# Patient Record
Sex: MALE | Race: WHITE | Employment: OTHER | URBAN - METROPOLITAN AREA
[De-identification: names, ages, dates, MRNs, and addresses within clinical notes are randomized per-mention and may not be internally consistent; named-entity substitution may affect disease eponyms.]

---

## 2017-01-10 ENCOUNTER — HOSPITAL ENCOUNTER (OUTPATIENT)
Dept: MRI IMAGING | Facility: HOSPITAL | Age: 67
Discharge: HOME | End: 2017-01-10
Attending: FAMILY MEDICINE
Payer: MEDICARE

## 2017-01-10 DIAGNOSIS — E78.5 OTHER AND UNSPECIFIED HYPERLIPIDEMIA: ICD-10-CM

## 2017-01-10 DIAGNOSIS — I25.810 CORONARY ARTERY DISEASE INVOLVING CORONARY BYPASS GRAFT OF NATIVE HEART WITHOUT ANGINA PECTORIS: ICD-10-CM

## 2017-01-10 DIAGNOSIS — E11.41 DIABETIC MONONEUROPATHY ASSOCIATED WITH TYPE 2 DIABETES MELLITUS (HCC): ICD-10-CM

## 2017-01-10 DIAGNOSIS — H53.9 VISUAL CHANGES: ICD-10-CM

## 2017-01-10 DIAGNOSIS — E11.9 TYPE II OR UNSPECIFIED TYPE DIABETES MELLITUS WITHOUT MENTION OF COMPLICATION, NOT STATED AS UNCONTROLLED: ICD-10-CM

## 2017-01-10 DIAGNOSIS — I10 UNSPECIFIED ESSENTIAL HYPERTENSION: ICD-10-CM

## 2017-01-10 DIAGNOSIS — R42 VERTIGO: ICD-10-CM

## 2017-01-10 DIAGNOSIS — Z86.79 HISTORY OF THORACIC AORTIC ANEURYSM REPAIR: ICD-10-CM

## 2017-01-10 DIAGNOSIS — E04.1 THYROID NODULE: ICD-10-CM

## 2017-01-10 DIAGNOSIS — Z98.890 HISTORY OF THORACIC AORTIC ANEURYSM REPAIR: ICD-10-CM

## 2017-01-10 DIAGNOSIS — Z95.2 S/P AVR: ICD-10-CM

## 2017-01-10 PROCEDURE — A9575 INJ GADOTERATE MEGLUMI 0.1ML: HCPCS

## 2017-01-10 PROCEDURE — 70549 MR ANGIOGRAPH NECK W/O&W/DYE: CPT

## 2017-01-10 PROCEDURE — 70546 MR ANGIOGRAPH HEAD W/O&W/DYE: CPT

## 2017-03-29 ENCOUNTER — APPOINTMENT (OUTPATIENT)
Dept: CV DIAGNOSTICS | Facility: HOSPITAL | Age: 67
DRG: 175 | End: 2017-03-29
Attending: EMERGENCY MEDICINE
Payer: MEDICARE

## 2017-03-29 ENCOUNTER — APPOINTMENT (OUTPATIENT)
Dept: CT IMAGING | Facility: HOSPITAL | Age: 67
DRG: 175 | End: 2017-03-29
Attending: EMERGENCY MEDICINE
Payer: MEDICARE

## 2017-03-29 ENCOUNTER — OFFICE VISIT (OUTPATIENT)
Dept: FAMILY MEDICINE CLINIC | Facility: CLINIC | Age: 67
End: 2017-03-29

## 2017-03-29 ENCOUNTER — APPOINTMENT (OUTPATIENT)
Dept: GENERAL RADIOLOGY | Facility: HOSPITAL | Age: 67
DRG: 175 | End: 2017-03-29
Attending: EMERGENCY MEDICINE
Payer: MEDICARE

## 2017-03-29 ENCOUNTER — APPOINTMENT (OUTPATIENT)
Dept: LAB | Age: 67
End: 2017-03-29
Attending: FAMILY MEDICINE
Payer: MEDICARE

## 2017-03-29 ENCOUNTER — APPOINTMENT (OUTPATIENT)
Dept: INTERVENTIONAL RADIOLOGY/VASCULAR | Facility: HOSPITAL | Age: 67
DRG: 175 | End: 2017-03-29
Attending: INTERNAL MEDICINE
Payer: MEDICARE

## 2017-03-29 ENCOUNTER — HOSPITAL ENCOUNTER (INPATIENT)
Facility: HOSPITAL | Age: 67
LOS: 4 days | Discharge: HOME | DRG: 175 | End: 2017-04-02
Attending: EMERGENCY MEDICINE | Admitting: HOSPITALIST
Payer: MEDICARE

## 2017-03-29 VITALS
WEIGHT: 182 LBS | HEART RATE: 86 BPM | TEMPERATURE: 98 F | DIASTOLIC BLOOD PRESSURE: 82 MMHG | RESPIRATION RATE: 18 BRPM | HEIGHT: 67 IN | SYSTOLIC BLOOD PRESSURE: 152 MMHG | OXYGEN SATURATION: 94 % | BODY MASS INDEX: 28.56 KG/M2

## 2017-03-29 DIAGNOSIS — N40.0 HYPERTROPHY OF PROSTATE WITHOUT URINARY OBSTRUCTION AND OTHER LOWER URINARY TRACT SYMPTOMS (LUTS): ICD-10-CM

## 2017-03-29 DIAGNOSIS — E11.9 TYPE II OR UNSPECIFIED TYPE DIABETES MELLITUS WITHOUT MENTION OF COMPLICATION, NOT STATED AS UNCONTROLLED: ICD-10-CM

## 2017-03-29 DIAGNOSIS — R77.8 ELEVATED TROPONIN: ICD-10-CM

## 2017-03-29 DIAGNOSIS — E55.9 VITAMIN D DEFICIENCY: ICD-10-CM

## 2017-03-29 DIAGNOSIS — R07.89 CHEST PAIN, ATYPICAL: Primary | ICD-10-CM

## 2017-03-29 DIAGNOSIS — I26.92 ACUTE SADDLE PULMONARY EMBOLISM WITHOUT ACUTE COR PULMONALE (HCC): ICD-10-CM

## 2017-03-29 DIAGNOSIS — E78.5 OTHER AND UNSPECIFIED HYPERLIPIDEMIA: ICD-10-CM

## 2017-03-29 DIAGNOSIS — R06.02 SOB (SHORTNESS OF BREATH): Primary | ICD-10-CM

## 2017-03-29 PROBLEM — R79.89 ELEVATED TROPONIN: Status: ACTIVE | Noted: 2017-03-29

## 2017-03-29 PROBLEM — J44.9 ASTHMA WITH COPD (CHRONIC OBSTRUCTIVE PULMONARY DISEASE) (HCC): Chronic | Status: ACTIVE | Noted: 2017-03-29

## 2017-03-29 PROBLEM — J44.89 ASTHMA WITH COPD (CHRONIC OBSTRUCTIVE PULMONARY DISEASE): Chronic | Status: ACTIVE | Noted: 2017-03-29

## 2017-03-29 PROCEDURE — 71010 XR CHEST AP PORTABLE  (CPT=71010): CPT

## 2017-03-29 PROCEDURE — 84443 ASSAY THYROID STIM HORMONE: CPT

## 2017-03-29 PROCEDURE — 5A2204Z RESTORATION OF CARDIAC RHYTHM, SINGLE: ICD-10-PCS | Performed by: INTERNAL MEDICINE

## 2017-03-29 PROCEDURE — 82043 UR ALBUMIN QUANTITATIVE: CPT

## 2017-03-29 PROCEDURE — 3E06317 INTRODUCTION OF OTHER THROMBOLYTIC INTO CENTRAL ARTERY, PERCUTANEOUS APPROACH: ICD-10-PCS | Performed by: INTERNAL MEDICINE

## 2017-03-29 PROCEDURE — 4A023N6 MEASUREMENT OF CARDIAC SAMPLING AND PRESSURE, RIGHT HEART, PERCUTANEOUS APPROACH: ICD-10-PCS | Performed by: INTERNAL MEDICINE

## 2017-03-29 PROCEDURE — 93306 TTE W/DOPPLER COMPLETE: CPT | Performed by: INTERNAL MEDICINE

## 2017-03-29 PROCEDURE — 93306 TTE W/DOPPLER COMPLETE: CPT

## 2017-03-29 PROCEDURE — 82306 VITAMIN D 25 HYDROXY: CPT

## 2017-03-29 PROCEDURE — 71275 CT ANGIOGRAPHY CHEST: CPT

## 2017-03-29 PROCEDURE — 93000 ELECTROCARDIOGRAM COMPLETE: CPT | Performed by: FAMILY MEDICINE

## 2017-03-29 PROCEDURE — 82570 ASSAY OF URINE CREATININE: CPT

## 2017-03-29 PROCEDURE — 84153 ASSAY OF PSA TOTAL: CPT

## 2017-03-29 PROCEDURE — 36415 COLL VENOUS BLD VENIPUNCTURE: CPT

## 2017-03-29 PROCEDURE — 36415 COLL VENOUS BLD VENIPUNCTURE: CPT | Performed by: EMERGENCY MEDICINE

## 2017-03-29 PROCEDURE — 70450 CT HEAD/BRAIN W/O DYE: CPT

## 2017-03-29 PROCEDURE — 80053 COMPREHEN METABOLIC PANEL: CPT

## 2017-03-29 PROCEDURE — 99223 1ST HOSP IP/OBS HIGH 75: CPT | Performed by: INTERNAL MEDICINE

## 2017-03-29 PROCEDURE — 80061 LIPID PANEL: CPT

## 2017-03-29 PROCEDURE — 83036 HEMOGLOBIN GLYCOSYLATED A1C: CPT

## 2017-03-29 PROCEDURE — 99214 OFFICE O/P EST MOD 30 MIN: CPT | Performed by: FAMILY MEDICINE

## 2017-03-29 PROCEDURE — 99223 1ST HOSP IP/OBS HIGH 75: CPT | Performed by: HOSPITALIST

## 2017-03-29 PROCEDURE — 5A12012 PERFORMANCE OF CARDIAC OUTPUT, SINGLE, MANUAL: ICD-10-PCS | Performed by: INTERNAL MEDICINE

## 2017-03-29 PROCEDURE — B31TYZZ FLUOROSCOPY OF LEFT PULMONARY ARTERY USING OTHER CONTRAST: ICD-10-PCS | Performed by: INTERNAL MEDICINE

## 2017-03-29 RX ORDER — LUTEIN 6 MG
1 TABLET ORAL DAILY
Status: DISCONTINUED | OUTPATIENT
Start: 2017-03-29 | End: 2017-03-29

## 2017-03-29 RX ORDER — MIDAZOLAM HYDROCHLORIDE 1 MG/ML
INJECTION INTRAMUSCULAR; INTRAVENOUS
Status: COMPLETED
Start: 2017-03-29 | End: 2017-03-29

## 2017-03-29 RX ORDER — ATORVASTATIN CALCIUM 10 MG/1
10 TABLET, FILM COATED ORAL NIGHTLY
Status: DISCONTINUED | OUTPATIENT
Start: 2017-03-29 | End: 2017-04-02

## 2017-03-29 RX ORDER — LOSARTAN POTASSIUM 50 MG/1
50 TABLET ORAL
Status: DISCONTINUED | OUTPATIENT
Start: 2017-03-29 | End: 2017-03-29

## 2017-03-29 RX ORDER — HEPARIN SODIUM 5000 [USP'U]/ML
INJECTION, SOLUTION INTRAVENOUS; SUBCUTANEOUS
Status: COMPLETED
Start: 2017-03-29 | End: 2017-03-29

## 2017-03-29 RX ORDER — HEPARIN SODIUM 5000 [USP'U]/ML
5000 INJECTION, SOLUTION INTRAVENOUS; SUBCUTANEOUS EVERY 8 HOURS
Status: CANCELLED | OUTPATIENT
Start: 2017-03-29

## 2017-03-29 RX ORDER — FLUTICASONE PROPIONATE 50 MCG
1 SPRAY, SUSPENSION (ML) NASAL DAILY
Status: DISCONTINUED | OUTPATIENT
Start: 2017-03-29 | End: 2017-04-02

## 2017-03-29 RX ORDER — HEPARIN SODIUM AND DEXTROSE 10000; 5 [USP'U]/100ML; G/100ML
1000 INJECTION INTRAVENOUS CONTINUOUS
Status: DISCONTINUED | OUTPATIENT
Start: 2017-03-29 | End: 2017-04-02

## 2017-03-29 RX ORDER — PANTOPRAZOLE SODIUM 20 MG/1
20 TABLET, DELAYED RELEASE ORAL
Status: DISCONTINUED | OUTPATIENT
Start: 2017-03-30 | End: 2017-04-02

## 2017-03-29 RX ORDER — HEPARIN SODIUM 5000 [USP'U]/ML
80 INJECTION INTRAVENOUS; SUBCUTANEOUS ONCE
Status: COMPLETED | OUTPATIENT
Start: 2017-03-29 | End: 2017-03-29

## 2017-03-29 RX ORDER — IRBESARTAN AND HYDROCHLOROTHIAZIDE 150; 12.5 MG/1; MG/1
1 TABLET, FILM COATED ORAL EVERY OTHER DAY
Status: DISCONTINUED | OUTPATIENT
Start: 2017-03-29 | End: 2017-03-29 | Stop reason: SDUPTHER

## 2017-03-29 RX ORDER — ONDANSETRON 2 MG/ML
4 INJECTION INTRAMUSCULAR; INTRAVENOUS EVERY 4 HOURS PRN
Status: DISCONTINUED | OUTPATIENT
Start: 2017-03-29 | End: 2017-04-02

## 2017-03-29 RX ORDER — SODIUM CHLORIDE 9 MG/ML
INJECTION, SOLUTION INTRAVENOUS CONTINUOUS
Status: DISCONTINUED | OUTPATIENT
Start: 2017-03-29 | End: 2017-03-30

## 2017-03-29 RX ORDER — HEPARIN SODIUM AND DEXTROSE 10000; 5 [USP'U]/100ML; G/100ML
INJECTION INTRAVENOUS CONTINUOUS PRN
Status: DISCONTINUED | OUTPATIENT
Start: 2017-03-29 | End: 2017-04-02

## 2017-03-29 RX ORDER — HYDROCHLOROTHIAZIDE 12.5 MG/1
12.5 CAPSULE, GELATIN COATED ORAL
Status: DISCONTINUED | OUTPATIENT
Start: 2017-03-29 | End: 2017-03-29

## 2017-03-29 RX ORDER — ALBUTEROL SULFATE 90 UG/1
2 AEROSOL, METERED RESPIRATORY (INHALATION) EVERY 6 HOURS PRN
Status: DISCONTINUED | OUTPATIENT
Start: 2017-03-29 | End: 2017-04-02

## 2017-03-29 RX ORDER — ACETAMINOPHEN 325 MG/1
650 TABLET ORAL EVERY 6 HOURS PRN
Status: DISCONTINUED | OUTPATIENT
Start: 2017-03-29 | End: 2017-04-02

## 2017-03-29 RX ORDER — DEXTROSE MONOHYDRATE 25 G/50ML
50 INJECTION, SOLUTION INTRAVENOUS
Status: DISCONTINUED | OUTPATIENT
Start: 2017-03-29 | End: 2017-04-02

## 2017-03-29 RX ORDER — TEMAZEPAM 15 MG/1
15 CAPSULE ORAL NIGHTLY PRN
Status: DISCONTINUED | OUTPATIENT
Start: 2017-03-29 | End: 2017-03-30

## 2017-03-29 RX ORDER — NITROGLYCERIN 0.4 MG/1
0.4 TABLET SUBLINGUAL EVERY 5 MIN PRN
Status: DISCONTINUED | OUTPATIENT
Start: 2017-03-29 | End: 2017-04-02

## 2017-03-29 RX ORDER — ASPIRIN 325 MG
325 TABLET ORAL DAILY
Status: DISCONTINUED | OUTPATIENT
Start: 2017-03-29 | End: 2017-04-02

## 2017-03-29 RX ORDER — LIDOCAINE HYDROCHLORIDE 10 MG/ML
INJECTION, SOLUTION INFILTRATION; PERINEURAL
Status: COMPLETED
Start: 2017-03-29 | End: 2017-03-29

## 2017-03-29 RX ORDER — HEPARIN SODIUM AND DEXTROSE 10000; 5 [USP'U]/100ML; G/100ML
18 INJECTION INTRAVENOUS ONCE
Status: COMPLETED | OUTPATIENT
Start: 2017-03-29 | End: 2017-03-29

## 2017-03-29 NOTE — HISTORICAL OFFICE NOTE
Connie Guerra  : 1950  ACCOUNT:  [id number]  [phone]  PCP:    TODAY'S DATE: 2015  DICTATED BY:  Jordy Freeman M.D.]    CHIEF COMPLAINT: [Followup of Aneurysm, thoracic ascending and Followup of Hypertriglyceridemia.]    HPI:  [On 2015 injected and no xanthelasma. ENT: mucosa pink and moist. NECK: jugular venous pressure not elevated. RESP: respirations with normal rate and rhythm, clear to auscultation and scattered congestion paige.  GI: no masses, tenderness or hepatosplenomegaly, rectal 05/28/13 isotonix vitamins and           1 drink daily                            05/28/13 MetFORMIN HCl         1000MG    1 tablet twice daily                     05/28/13 Omega 3               1000MG    3,000mg.  daily

## 2017-03-29 NOTE — PLAN OF CARE
RESPIRATORY - ADULT    • Achieves optimal ventilation and oxygenation Progressing        Received patient at 1500 awake and alert. Complains of SOB with activity. No pain. Lungs diminished. Heparin at protocol. Shaved for cath lab procedure.   Son at b

## 2017-03-29 NOTE — PROGRESS NOTES
Prelim procedure note    RA  7  RV 50/7  PA 55/15  Mean 26    5 mg TPA instillled directly into left main PA    Unable to directly cannulate right main PA -- even with APC catheter    During manipulation of catheter developed CHB -- no escape rhythm -- unr

## 2017-03-29 NOTE — CONSULTS
[facility]  Report of Consultation    Aniceto Leary III Patient Status:  Inpatient    3/21/1950 MRN [id number]   [facility] 6NE-A Attending Aline Moya MD   Hosp Day # 0 PCP Valentine Guerra DO       Assessment / Plan:    1) CKD 3- b disease. He presents with chest discomfort and is found to have a saddle pulmonary embolus.       History:  Past Medical History   Diagnosis Date   • Personal history of other diseases of circulatory system    • Finger, superficial foreign body (splinter), (FLONASE) 50 MCG/ACT nasal spray 1 spray, 1 spray, Nasal, Daily  •  Albuterol Sulfate  (90 Base) MCG/ACT inhaler 2 puff, 2 puff, Inhalation, Q6H PRN  •  [START ON 3/30/2017] Pantoprazole Sodium (PROTONIX) EC tab 20 mg, 20 mg, Oral, QAM AC  •  Atorva Systems:  Please see HPI for pertinent positives. 10 point review of systems otherwise reviewed and negative.      Physical Exam:  /87 mmHg  Pulse 76  Temp(Src) 98.7 °F (37.1 °C) (Temporal)  Resp 15  Ht 67\"  Wt 182 lb  BMI 28.50 kg/m2  SpO2 100%  Tem

## 2017-03-29 NOTE — CONSULTS
[facility]    PATIENT'S NAME: Guero Vinson   ATTENDING PHYSICIAN: ZEB Jacinto Ada: Betsy Lauren M.D.    PATIENT ACCOUNT#:   [de-identified]    LOCATION:  [address] [place] [address] [facility]  MEDICAL RECORD #:   [id number]       DATE OF BI 1000 mg b.i.d., Byetta, irbesartan/hydrochlorothiazide 150/12.5 mg every other day, simvastatin 20 mg daily, aspirin 81 mg daily. ALLERGIES:  Bactrim, dairy products, Darvocet, and Neurontin. SOCIAL HISTORY:  He never smoked.     FAMILY HISTORY:  Dakota Rodriguez brain and neck. 4.   Coronary artery disease status post coronary artery bypass graft in 2007.  5.   Bioprosthetic aortic valve replacement in 2011:  Echo in 2016 had a slight gradient while in keeping with this type of valve.   6.   Mild thrombocytopenia:

## 2017-03-29 NOTE — H&P
[facility]  History and Physical     Leo Daniels III Patient Status:  Emergency    3/21/1950 MRN [id number]   Location [address] [facility] Attending Andres Brito MD   Hosp Day # 0 PCP Tamika Bone DO     Chief Complain Past Surgical History    VALVE REPLACEMENT  2/2011    Comment aoritc    OTHER SURGICAL HISTORY      Comment aortic aneurysm repair    COLONOSCOPY  01/03/2011    CABG  2007    OTHER SURGICAL HISTORY  2/2011    Comment reimplantation of bypass grafts Solution Pen-injector Inject 0.02 mL into the skin 2 (two) times daily.  Disp: 3.6 mL Rfl: 1   BD PEN NEEDLE JADEN U/F 32G X 4 MM Does not apply Misc USE ONE SYRINGE TWICE DAILY Disp: 200 each Rfl: 11   Irbesartan-Hydrochlorothiazide 150-12.5 MG Oral Tab [facility] sounds. No rebound, guarding or organomegaly. Neurologic: CNII-XII grossly intact. No focal neurological deficits. Musculoskeletal: Moves all extremities. Extremities: No edema or cyanosis. Integument: No rashes or lesions.    Psychiatric: Appropriate

## 2017-03-29 NOTE — PROGRESS NOTES
Attempted to echo in ER, Turned away pt was going to CT Scan, and then attempted to do it again on the floor, turned away second time pt going to cath lab. Notified Dr Maggie Ward.

## 2017-03-29 NOTE — PROGRESS NOTES
[facility] Family Medicine Office Note  Chief Complaint:   Patient presents with:  Breathing Problem: winded x2 days. quad.  heart, aortic       HPI:   This is a 75year old male coming in for shortness of breath with exertion for the past few day Alcohol Use: No              Family History:  Family History   Problem Relation Age of Onset   • Heart Attack Mother    • Alcohol and Other Disorders Associated Father    • Cancer Paternal Grandfather    • Diabetes Father    • Substance Abuse Brother    • mouth. Disp:  Rfl:    EPINEPHrine (EPIPEN 2-BIENVENIDO) 0.3 MG/0.3ML Injection Device Inject  as directed. Disp:  Rfl:    Glucose Blood (FREESTYLE LITE) In Vitro Strip 1 Each by Other route daily.  Test 6 times daily Disp: 100 Each Rfl: 1   Lutein 20 MG Oral Tab T x3, no apparent distress  HEAD:  Normocephalic, atraumatic  HEENT:  Eyes: EOMI, PERRLA, no scleral icterus, conjunctivae clear bilaterally. Ears: TM's clear and visible bilaterally, no excess cerumen or erythema.   Throat:  No tonsillar erythema or exudate Problem List:     Diplopia     Viral warts, unspecified     Other seborrheic keratosis     Proteinuria     Hypertrophy of prostate without urinary obstruction and other lower urinary tract symptoms (LUTS)     Obesity, unspecified     Goiter, specified as s

## 2017-03-29 NOTE — CONSULTS
[facility] cardi[facility] consult: full note dictated    74 y/o wm with cad sp cabg 2007, bioprosthetic avr 2011, who was sent to er by pcp today when he presented complaining of stark with mild exertion for the past two days. It came on all of the sudden.  He has h

## 2017-03-29 NOTE — ED PROVIDER NOTES
Patient Seen in: [facility] Emergency Department    History   Patient presents with:  Dyspnea KATYA SOB (respiratory)    Stated Complaint: katya    HPI    Ayleen Ruiz is a 56-year-old male presenting to the emergency department for shortness of breath.   Laretta Runner MetFORMIN HCl (GLUCOPHAGE) 1000 MG Oral Tab,  Take 1 tablet by mouth 2 (two) times daily. Patient taking differently: Take 500 mg by mouth 2 (two) times daily.    Exenatide (BYETTA 5 MCG PEN) 5 MCG/0.02ML Subcutaneous Solution Pen-injector,  Inject 0.02 mL Alcohol Use: No                Review of Systems    Positive for stated complaint: katya  Other systems are as noted in HPI. Constitutional and vital signs reviewed. All other systems reviewed and negative except as noted above. Notable for the following:     .0 (*)     All other components within normal limits   PROTHROMBIN TIME (PT) - Normal   PTT, ACTIVATED - Normal    Narrative: The aPTT Heparin Therapeutic Range is approximately 65- 104 seconds.  The therapeutic ran

## 2017-03-29 NOTE — PATIENT INSTRUCTIONS
Shortness of Breath (Dyspnea)  Shortness of breath is the feeling that you can't catch your breath or get enough air. It is also known as dyspnea. Dyspnea can be caused by many different conditions. They include:  · Acute asthma attack.   · Worsening of (Note: If an X-ray was taken, a specialist will review it. You will be notified of any new findings that may affect your care.)  Call 911 or get immediate medical care  Shortness of breath may be a sign of a serious medical problem.  For example, it may be

## 2017-03-30 ENCOUNTER — APPOINTMENT (OUTPATIENT)
Dept: ULTRASOUND IMAGING | Facility: HOSPITAL | Age: 67
DRG: 175 | End: 2017-03-30
Attending: INTERNAL MEDICINE
Payer: MEDICARE

## 2017-03-30 PROCEDURE — 99223 1ST HOSP IP/OBS HIGH 75: CPT | Performed by: INTERNAL MEDICINE

## 2017-03-30 PROCEDURE — 93970 EXTREMITY STUDY: CPT

## 2017-03-30 PROCEDURE — 99232 SBSQ HOSP IP/OBS MODERATE 35: CPT | Performed by: HOSPITALIST

## 2017-03-30 PROCEDURE — 99233 SBSQ HOSP IP/OBS HIGH 50: CPT | Performed by: INTERNAL MEDICINE

## 2017-03-30 PROCEDURE — 76770 US EXAM ABDO BACK WALL COMP: CPT

## 2017-03-30 RX ORDER — SODIUM CHLORIDE 9 MG/ML
INJECTION, SOLUTION INTRAVENOUS CONTINUOUS
Status: DISCONTINUED | OUTPATIENT
Start: 2017-03-30 | End: 2017-04-02

## 2017-03-30 RX ORDER — WARFARIN SODIUM 5 MG/1
5 TABLET ORAL
Status: DISCONTINUED | OUTPATIENT
Start: 2017-03-30 | End: 2017-03-31

## 2017-03-30 RX ORDER — ZOLPIDEM TARTRATE 10 MG/1
10 TABLET ORAL NIGHTLY PRN
Status: DISCONTINUED | OUTPATIENT
Start: 2017-03-30 | End: 2017-04-01

## 2017-03-30 NOTE — CONSULTS
Hematology Initial Consultation Note    Patient Name: Paula Young  Medical Record Number: [id number]    YOB: 1950   Date of Consultation: 3/30/2017   Physician requesting consultation: Dr. Drew Luna    Reason for Consultation:  Aliya Whaley • Overweight(278.02)    • Screening for other and unspecified endocrine, nutritional, metabolic, and immunity disorders    • Screening for other and unspecified genitourinary condition    • Screening for iron deficiency anemia    • Special screening for • insulin aspart  1-50 Units Subcutaneous TID CC and HS   • silver sulfADIAZINE   Topical BID     • sodium chloride     • Heparin infusion     • Continuous dose Heparin infusion 1,000 Units/hr (03/30/17 0800)     PRN Medications:  Albuterol Sulfate HFA, mmHg (03/30 1115)  Temp: 98 °F (36.7 °C) (03/30 0900)  Do Not Use - Resp Rate: --  SpO2: 97 % (03/30 1115)    Wt Readings from Last 6 Encounters:  03/29/17 : 82.555 kg (182 lb)  03/29/17 : 82.555 kg (182 lb)  03/22/17 : 83.008 kg (183 lb)  12/13/16 : 82.55 severe hypokinesis and scarring of the entire inferior myocardium; hypokinesis and scarring of the basal-mid anteroseptal myocardium; dyskinesis of the apical septal myocardium; severe hypokinesis of the basal-mid inferoseptal myocardium.   3. Aortic valve: evidence of right ventricular dilatation. THORACIC AORTA:  Normal.  LUNGS:  No evidence of downstream pulmonary infarction in the right lower lobe at this time. There is slight atelectasis at the right lung base.  There is a small granuloma in in the perip be convinced. For now keep on UFH gtt.   If pt still not agreeable to warfarin would plan to transition to eliquis upon discharge.   -no thrombophilia lab work up indicated in this setting since his age is >56, is first unprovoked VTE, and lacks family hx

## 2017-03-30 NOTE — PROGRESS NOTES
03/30/17 1106   Clinical Encounter Type   Visited With Patient   Sacramental Encounters   Sacrament of Sick-Anointing Visiting  anointed patient

## 2017-03-30 NOTE — PROGRESS NOTES
[facility]  Nephrology Progress Note    Jules Moe III Attending:  Zachery Sharp MD       Assessment and Plan:  1) CKD 3- baseline Cr approximately 1.5-1.7 milligrams per deciliter for 5-10 years perhaps longer is of unclear etiology and likely mul clubbing, cyanosis; no edema  Neurologic: Cranial nerves grossly intact, moving all extremities  Skin: Warm and dry, no rashes       Labs:     Lab Results  Component Value Date   WBC 7.6 03/30/2017   HGB 14.3 03/30/2017   HCT 42.6 03/30/2017   PLT 99.0 03/ 200-3,000 Units/hr Intravenous Continuous PRN   heparin (PORCINE) drip 14462aiszc/250mL infusion CONTINUOUS 1,000 Units/hr Intravenous Continuous   iohexol (OMNIPAQUE) 350 MG/ML injection 67 mL 67 mL Intravenous ONCE PRN   silver sulfADIAZINE (SILVADENE) 1

## 2017-03-30 NOTE — PROGRESS NOTES
[facility]  Progress Note    Darnell Jonesas Intini III     Subjective:  No chest pain or shortness of breath.  No cognitive issues after intra-pa delivery of tpa yesterday with heart block with no intrinsic escape rhythm resulting in very brief cpr and one ca  03/30/2017   CA 8.7 03/30/2017       Lab Results  Component Value Date   TROP 0.105* 03/30/2017   TROP 0.049* 03/29/2017   TROP <0.046 07/17/2016       Lab Results  Component Value Date   PT 12.7 03/18/2013   PT 14.6 02/20/2011   PT 19.6* 02/01/2 temazepam (RESTORIL) cap 15 mg 15 mg Oral Nightly PRN       Assessment and Plan:  Principal Problem:    Acute saddle pulmonary embolism with pulmonary htn  Active Problems:    Diabetes ([address]    Essential hypertension    CAD (coronary artery disease) sp c

## 2017-03-30 NOTE — PLAN OF CARE
Diabetes/Glucose Control    • Glucose maintained within prescribed range Not Progressing        Assumed care of patient this a.m. @ 0730. Patient is A/O x3. VSS. Heparin gtt @ 1000units/hr. PTT this a.m. therapuetic. Patient denies any KAM.  Right groin sit

## 2017-03-30 NOTE — PLAN OF CARE
Pt received awake, a/o x4. Pt denies complaints of pain or discomfort. Right groin stable. Pedal pulses palpable. Pt denies shortness of breath. o2 sat >95% on room air. Defib pads removed and silvadene cream applied to burns as ordered.  Heparin gtt infusi

## 2017-03-30 NOTE — PROCEDURES
[facility]    PATIENT'S NAME: Maliha Ozuna   ATTENDING PHYSICIAN: Beny Lundberg M.D. OPERATING PHYSICIAN: Camille Santana M.D.    PATIENT ACCOUNT#:   [de-identified]    LOCATION:  [address] [place] [address]  MEDICAL RECORD #:   [id number]       DATE OF BIR Upon doing so, the patient developed complete heart block. His baseline rhythm was sinus with a left bundle-branch block. He had no escape rhythm. This did not resolve with a deep cough and deteriorated into ventricular fibrillation.   He underwent 10-20 [address] [id number]  /

## 2017-03-30 NOTE — PROGRESS NOTES
[facility]  Progress Note     Alen Riley III Patient Status:  Inpatient    3/21/1950 MRN [id number]   [facility] 6NE-A Attending Rosette Downey MD   Hosp Day # 1 PCP Avel Mckeon DO     Chief Complaint: sob and cp    S: Patient i Oral Nightly   • aspirin  325 mg Oral Daily   • insulin detemir  5 Units Subcutaneous Daily   • insulin aspart  1-68 Units Subcutaneous TID CC   • insulin aspart  1-50 Units Subcutaneous TID CC and HS   • silver sulfADIAZINE   Topical BID       ASSESSMENT

## 2017-03-30 NOTE — PAYOR COMM NOTE
Attending Physician: David Morales MD    Review Type: ADMISSION   Reviewer: Cesar Castro       Date: March 30, 2017 - 8:38 AM  Payor: [address] Number: [id number]  Admit date: 3/29/2017 10:52 AM   Admitted from Emergency Dept.: y DAY:  acetaminophen (TYLENOL) tab 650 mg     Date Action Dose Route User    3/30/2017 0339 Given 650 mg Oral See Chu RN      aspirin tab 325 mg     Date Action Dose Route User    3/29/2017 2056 Given 325 mg Oral See Chu RN      He normal limits    Narrative:     FEU = Fibrinogen Equivalent Units.     D-Dimer results of less than 0.5 ug/mL (FEU) have been shown to contribute to the exclusion of venous thromboembolism with a negative predictive value of approximately 95% when results a Notable for the following:     POC Glucose 112 (*)     All other components within normal limits   CBC W/ DIFFERENTIAL - Abnormal; Notable for the following:     .0 (*)     All other components within normal limits   CBC W/ DIFFERENTIAL - Abnormal; Patient also with significant coronary disease. 1. Serial trops  2. ECHO  3. CTA chest preferred by Cardiology  4. Will ask renal to evaluate as pt will receive contrast  5. Empiric heparin drip for either angina or PE until results  2.  CAD s/p CABG, AVR,

## 2017-03-31 PROCEDURE — 99232 SBSQ HOSP IP/OBS MODERATE 35: CPT | Performed by: INTERNAL MEDICINE

## 2017-03-31 PROCEDURE — 99232 SBSQ HOSP IP/OBS MODERATE 35: CPT | Performed by: HOSPITALIST

## 2017-03-31 RX ORDER — POTASSIUM CHLORIDE 20 MEQ/1
20 TABLET, EXTENDED RELEASE ORAL ONCE
Status: COMPLETED | OUTPATIENT
Start: 2017-03-31 | End: 2017-03-31

## 2017-03-31 NOTE — PLAN OF CARE
Pt received sitting up in the chair. Pt complains of mild right groin tenderness. Right groin stable. No hematoma. Pedal pulses present. Pt complains of slight pleuritic pain and cough with deep breathing. No JULIEN.  IS instructed and encouraged O2 sat

## 2017-03-31 NOTE — PROGRESS NOTES
[facility]  Nephrology Progress Note    Baldev Rodriguez III Attending:  Latonya Hills MD       Assessment and Plan:  1) CKD 3- at long term baseline x 10 yrs; no contrast nephropathy despite CTA / pulmonary angiogram. Appears euvolemic.  UA bland; US wit Imaging: All imaging studies reviewed.     Meds:     Current Facility-Administered Medications:  0.9%  NaCl infusion  Intravenous Continuous   Warfarin Sodium (COUMADIN) tab 5 mg 5 mg Oral Once at night   Zolpidem Tartrate (AMBIEN) tab 10 mg 10 mg Or

## 2017-03-31 NOTE — PROGRESS NOTES
Hematology Progress Note    Patient Name: Jyoti Waters III  Medical Record Number: [id number]    YOB: 1950     Reason for Consultation:  Veronica Tan was seen today for the diagnosis of pulmonary embolism    Interval events:  Remains bilaterally  GI/Abd: Soft, non-tender with normoactive bowel sounds  Skin: no rashes or petechiae    Laboratory:  Recent Labs   Lab  03/29/17   1150  03/30/17   0440  03/31/17   0435   WBC  8.3  7.6  7.5   HGB  16.1  14.3  13.7   HCT  46.3  42.6  39.9   PL affordable.   -no thrombophilia lab work up indicated in this setting since his age is >56, is first unprovoked VTE, and lacks family hx of VTE.   -from hematology standpoint his aspirin can be discontinued now that he will remain on an anticoagulant, thou

## 2017-03-31 NOTE — PROGRESS NOTES
[facility]  Progress Note    Yulissa Elroy III Patient Status:  Inpatient    3/21/1950 MRN [id number]   [facility] 8NE-A Attending Meseret Major MD   Hosp Day # 2 PCP Ridge Mcdonough DO       Asked by general cards to evaluate for possib .0 03/31/2017       Lab Results  Component Value Date   PT 12.7 03/18/2013   INR 1.10 03/31/2017       Lab Results  Component Value Date    03/31/2017   K 3.9 03/31/2017    03/31/2017   CO2 25.0 03/31/2017   BUN 31 03/31/2017   ROSELYN

## 2017-03-31 NOTE — CM/SW NOTE
Spoke w/ Phamacy help desk of [address]  and Eliquis is $0 copay  Sosa Eden RN/CM  NICU  [phone]

## 2017-03-31 NOTE — PROGRESS NOTES
[facility]  Progress Note     Jules Moe III Patient Status:  Inpatient    3/21/1950 MRN [id number]   [facility] 6NE-A Attending Zachery Sharp MD   Hosp Day # 2 PCP Kristine Frey DO     Chief Complaint: sob    S: Patient denies s reviewed in Epic.     Medications:   • Warfarin Sodium  5 mg Oral Once at night   • Fluticasone Propionate  1 spray Nasal Daily   • Pantoprazole Sodium  20 mg Oral QAM AC   • Atorvastatin Calcium  10 mg Oral Nightly   • aspirin  325 mg Oral Daily   • jared

## 2017-03-31 NOTE — PROGRESS NOTES
[facility]  Progress Note    Cipriano Crowe III     Subjective:  No chest pain or shortness of breath. Feels good. Does not want to take coumadin.        Intake/Output:    Intake/Output Summary (Last 24 hours) at 03/31/17 1047  Last data filed at 03/31 19.6* 02/01/2011   INR 1.10 03/31/2017   INR 1.03 03/29/2017   INR 0.96 07/17/2016     .         Medications:    Current Facility-Administered Medications:  0.9%  NaCl infusion  Intravenous Continuous   Zolpidem Tartrate (AMBIEN) tab 10 mg 10 mg Oral Nightl CAD (coronary artery disease) sp cabg: no angina    S/P bioprosthetic AVR    Chest pain, atypical    Elevated troponin    CKD (chronic kidney disease)    Proteinuria, unspecified    DARWIN (acute kidney injury) ([address]    Thrombocytopenia (HCC)    Imp: he is sp

## 2017-04-01 ENCOUNTER — PRIOR ORIGINAL RECORDS (OUTPATIENT)
Dept: OTHER | Age: 67
End: 2017-04-01

## 2017-04-01 ENCOUNTER — APPOINTMENT (OUTPATIENT)
Dept: GENERAL RADIOLOGY | Facility: HOSPITAL | Age: 67
DRG: 175 | End: 2017-04-01
Attending: INTERNAL MEDICINE
Payer: MEDICARE

## 2017-04-01 ENCOUNTER — APPOINTMENT (OUTPATIENT)
Dept: CT IMAGING | Facility: HOSPITAL | Age: 67
DRG: 175 | End: 2017-04-01
Attending: INTERNAL MEDICINE
Payer: MEDICARE

## 2017-04-01 PROCEDURE — 99232 SBSQ HOSP IP/OBS MODERATE 35: CPT | Performed by: INTERNAL MEDICINE

## 2017-04-01 PROCEDURE — 71010 XR CHEST AP PORTABLE  (CPT=71010): CPT

## 2017-04-01 PROCEDURE — 70450 CT HEAD/BRAIN W/O DYE: CPT

## 2017-04-01 NOTE — PROGRESS NOTES
[facility] Card[facility] Progress Note    Subjective:  Describes an episode last night that when he got out of bed to go to , felt a dark object was pulling him down, was unable to walk, hit his head, knew he was in hospital. [facility] now.  Called nurse after back in and examined. Agree with above note and assessment. See changes above in italics.    Latasha Schafer MD  [facility]  Cardiac Electrophysiolgy

## 2017-04-01 NOTE — PROGRESS NOTES
[facility]  Progress Note     Lonita Marking III Patient Status:  Inpatient    3/21/1950 MRN [id number]   [facility] 6NE-A Attending Fatuma Bledsoe MD   Hosp Day # 3 PCP Sandrita Odonnell DO     Chief Complaint: sob    S: Patient denies s --    --    --    ALT  26   --    --    --    --    BILT  1.1   --    --    --    --    TP  7.3   --    --    --    --     < > = values in this interval not displayed. Estimated Creatinine Clearance: 36.7 mL/min (based on Cr of 1.8).     Recent Labs

## 2017-04-01 NOTE — PROGRESS NOTES
Hematology Progress Note    Patient Name: Jyoti Waters III  Medical Record Number: [id number]    YOB: 1950     Reason for Consultation:  Veronica Tan was seen today for the diagnosis of pulmonary embolism    Interval events:  Had sahni bilaterally  GI/Abd: Soft, non-tender with normoactive bowel sounds  Skin: no rashes or petechiae    Laboratory:  Recent Labs   Lab  03/30/17   0440  03/31/17   0435  04/01/17   0622   WBC  7.6  7.5  8.2   HGB  14.3  13.7  13.2   HCT  42.6  39.9  39.5   PL standpoint his aspirin can be discontinued now that he will remain on an anticoagulant, though will defer to cardiology    *CKD  -baseline cre 1.3-1.7  -his creatinine is adequate for DOAC use, eliquis would be preferred over xarelto as is a little more fo

## 2017-04-01 NOTE — PLAN OF CARE
Pt had an unwitness fall around 0250. He had an Ambien 10mg at 0016 and went to sleep shortly after.   He stated he \"had to urinate and when he went to stand felt dizzy and dropped the urinal.  Then finished urinating in the bathroom and crawled on his kn

## 2017-04-02 VITALS
BODY MASS INDEX: 28.56 KG/M2 | RESPIRATION RATE: 18 BRPM | HEIGHT: 67 IN | OXYGEN SATURATION: 96 % | SYSTOLIC BLOOD PRESSURE: 127 MMHG | DIASTOLIC BLOOD PRESSURE: 85 MMHG | WEIGHT: 182 LBS | HEART RATE: 90 BPM | TEMPERATURE: 98 F

## 2017-04-02 PROCEDURE — 99232 SBSQ HOSP IP/OBS MODERATE 35: CPT | Performed by: INTERNAL MEDICINE

## 2017-04-02 PROCEDURE — 99239 HOSP IP/OBS DSCHRG MGMT >30: CPT | Performed by: INTERNAL MEDICINE

## 2017-04-02 RX ORDER — ATORVASTATIN CALCIUM 10 MG/1
10 TABLET, FILM COATED ORAL NIGHTLY
Qty: 30 TABLET | Refills: 0 | Status: SHIPPED | OUTPATIENT
Start: 2017-04-02 | End: 2017-04-17

## 2017-04-02 NOTE — PLAN OF CARE
Pt refusing to take Lipitor stating his cholesterol is fine and the doctor that ordered it does not know him. He will not be taking Lipitor and he wants it d/c'd.

## 2017-04-02 NOTE — PROGRESS NOTES
Hematology Progress Note    Patient Name: Nikolay Nunez III  Medical Record Number: [id number]    YOB: 1950     Reason for Consultation:  Malka Tom was seen today for the diagnosis of pulmonary embolism    Interval events:  No furth auscultation bilaterally  GI/Abd: Soft, non-tender with normoactive bowel sounds  Skin: no rashes or petechiae    Laboratory:  Recent Labs   Lab  03/31/17   0435  04/01/17   0622  04/02/17   0506   WBC  7.5  8.2  8.7   HGB  13.7  13.2  13.2   HCT  39.9  39 will remain on an anticoagulant, though will defer to cardiology    *CKD  -Creatinine has been creeping up but creat clearance still acceptable.   -his creatinine is adequate for DOAC use, eliquis would be preferred over xarelto as is a little more forgivi

## 2017-04-02 NOTE — DISCHARGE SUMMARY
[facility] HOSPITALIST  DISCHARGE SUMMARY     Quincy Serve III Patient Status:  Inpatient    3/21/1950 MRN [id number]   [facility] 8NE-A Attending Jimbo Ivory MD   [facility] Day # 4 PCP Shimon Freitas DO     Date of Admission: 3/29/2017  Date of bradycardic during the procedure. He was kept on a heparin ggt and then transitioned to eliquis on DC. Patient suffered a brief fall without trauma while he had taken [place]. This was dced and he had not further episodes. Head CT was negative for bleed. extending into the right lower lobe first order branch/interlobar pulmonary artery which is totally occlusive.    There is blood clot extending into the right middle lobe and right upper lobe pulmonary artery and involving first and second order branches o Irbesartan-Hydrochlorothiazide 150-12.5 MG Tabs        Take 1 tablet by mouth every other day. Refills:  0       Lutein 20 MG Tabs        Take 1 Tab by mouth daily.     Refills:  0       metoprolol Tartrate 25 MG Tabs   Commonly known as:  LOPRESSOR nontender, nondistended. Positive bowel sounds. No rebound or guarding. Neurologic: No focal neurological deficits. Musculoskeletal: Moves all extremities. Extremities: No edema.   -----------------------------------------------------------------------

## 2017-04-02 NOTE — PLAN OF CARE
Pt c/o SOB and pain to R low sided chest pain similar to when he had pneumonia in the past.  Dr Serjio Vilallobos paged with orders for chest xray. White count is 8.2 and previous Xray was (-) acute.

## 2017-04-02 NOTE — PLAN OF CARE
MUSCULOSKELETAL - ADULT    • Return mobility to safest level of function Completed        SAFETY ADULT - FALL    • Free from fall injury Completed          CARDIOVASCULAR - ADULT    • Maintains optimal cardiac output and hemodynamic stability Progressing

## 2017-04-02 NOTE — PROGRESS NOTES
[facility] Cardiology Progress Note    Subjective:  Didn't sleep at all last night. Thinks still recovering from Ambien. R lower chest rib pain, started yesterday, thinks started with coughing. On my rounds no cp or sob. Walking without stark.      [facility]ios  [facility] cardiology

## 2017-04-02 NOTE — PROGRESS NOTES
[facility]  Progress Note     Zaina Held III Patient Status:  Inpatient    3/21/1950 MRN [id number]   [facility] 6NE-A Attending Karen Blake MD   Hosp Day # 4 PCP Chris Catherine DO     Chief Complaint: sob    S: Patient denies s Atorvastatin Calcium  10 mg Oral Nightly   • aspirin  325 mg Oral Daily   • insulin detemir  5 Units Subcutaneous Daily   • insulin aspart  1-68 Units Subcutaneous TID CC   • insulin aspart  1-50 Units Subcutaneous TID CC and HS   • silver sulfADIAZINE   T

## 2017-04-02 NOTE — PROGRESS NOTES
IV and tele discontinued. Pt. Received discharge instructions and follow-up information. Lipitor paper prescription given to pt. Eliquis already e-scribed. Questions addressed and answered.  Pt. Refused transport by wheelchair and was able to ambulate to d/

## 2017-04-03 ENCOUNTER — PATIENT OUTREACH (OUTPATIENT)
Dept: CASE MANAGEMENT | Age: 67
End: 2017-04-03

## 2017-04-03 DIAGNOSIS — I26.92 ACUTE SADDLE PULMONARY EMBOLISM WITHOUT ACUTE COR PULMONALE (HCC): Primary | ICD-10-CM

## 2017-04-04 ENCOUNTER — TELEPHONE (OUTPATIENT)
Dept: FAMILY MEDICINE CLINIC | Facility: CLINIC | Age: 67
End: 2017-04-04

## 2017-04-04 NOTE — TELEPHONE ENCOUNTER
Call to pt-advised dr Isa Moulton received update from diane obrien mgr. Discussed rationale for TCM/hosp f/u visit by 4/9 and offered appt with dr Isa Moulton tomorrow.    Pt sts \"am supposed to follow up with cardiology, have called them the past 2 days and still

## 2017-04-04 NOTE — PROGRESS NOTES
Initial Post Discharge Follow Up   Discharge Date: 4/2/17  Contact Date: 4/3/2017    Consent Verification:  Assessment Completed With: Patient  HIPAA Verified? Yes    1.  Tell me why you were in the hospital?  Pt states he went to the ER with labored breat daily. Test 6 times daily Disp: 100 Each Rfl: 1   Lutein 20 MG Oral Tab Take 1 Tab by mouth daily. Disp:  Rfl:    ONETOUCH LANCETS Test 6 times daily Disp:  Rfl:    Misc Natural Products (TOTAL CARDIO HEALTH FORMULA) Oral Cap Take 1 Cap by mouth daily.  Dis sedation. If you will be taking oral sedation, you must bring a  who will drive you home (the  does not necessarily have to stay throughout the procedure).   If you suspect you may be pregnant, please consult with your physician prior to your e exam requires an IV Contrast (Gadolinium) injection, you need to stop breastfeeding for 24-48 hours after the procedure.      IF A CHILD is scheduled for this procedure, parents should be advised that they will not be able to accompany the child in the test

## 2017-04-04 NOTE — TELEPHONE ENCOUNTER
Spoke to pt for TCM today. He states he is not taking any cardiac meds (BP/cholesterol) until he sees PCP or cardiologist.  Educated pt on the importance of these medications, however he states he is not going to take any cardiac meds until he is seen.   Keegan Michaud

## 2017-04-05 ENCOUNTER — HOSPITAL ENCOUNTER (OUTPATIENT)
Dept: MRI IMAGING | Facility: HOSPITAL | Age: 67
Discharge: HOME | End: 2017-04-05
Attending: Other
Payer: MEDICARE

## 2017-04-05 ENCOUNTER — OFFICE VISIT (OUTPATIENT)
Dept: FAMILY MEDICINE CLINIC | Facility: CLINIC | Age: 67
End: 2017-04-05

## 2017-04-05 VITALS
DIASTOLIC BLOOD PRESSURE: 80 MMHG | BODY MASS INDEX: 28.72 KG/M2 | RESPIRATION RATE: 14 BRPM | SYSTOLIC BLOOD PRESSURE: 120 MMHG | TEMPERATURE: 97 F | HEIGHT: 67 IN | OXYGEN SATURATION: 99 % | WEIGHT: 183 LBS | HEART RATE: 72 BPM

## 2017-04-05 DIAGNOSIS — N17.9 AKI (ACUTE KIDNEY INJURY) (HCC): ICD-10-CM

## 2017-04-05 DIAGNOSIS — J45.20 ALLERGIC ASTHMA, MILD INTERMITTENT, UNCOMPLICATED: ICD-10-CM

## 2017-04-05 DIAGNOSIS — E29.1 HYPOGONADISM MALE: ICD-10-CM

## 2017-04-05 DIAGNOSIS — J44.9 ASTHMA WITH COPD (CHRONIC OBSTRUCTIVE PULMONARY DISEASE) (HCC): Chronic | ICD-10-CM

## 2017-04-05 DIAGNOSIS — N18.3 CKD (CHRONIC KIDNEY DISEASE), STAGE 3 (MODERATE): ICD-10-CM

## 2017-04-05 DIAGNOSIS — Z95.2 S/P AVR: ICD-10-CM

## 2017-04-05 DIAGNOSIS — E66.9 OBESITY, UNSPECIFIED: ICD-10-CM

## 2017-04-05 DIAGNOSIS — E11.9 TYPE 2 DIABETES MELLITUS WITHOUT COMPLICATION, WITHOUT LONG-TERM CURRENT USE OF INSULIN (HCC): ICD-10-CM

## 2017-04-05 DIAGNOSIS — I10 ESSENTIAL HYPERTENSION: ICD-10-CM

## 2017-04-05 DIAGNOSIS — E11.22 TYPE 2 DIABETES MELLITUS WITH STAGE 3 CHRONIC KIDNEY DISEASE, WITHOUT LONG-TERM CURRENT USE OF INSULIN (HCC): ICD-10-CM

## 2017-04-05 DIAGNOSIS — R80.9 PROTEINURIA, UNSPECIFIED: ICD-10-CM

## 2017-04-05 DIAGNOSIS — N18.30 TYPE 2 DIABETES MELLITUS WITH STAGE 3 CHRONIC KIDNEY DISEASE, WITHOUT LONG-TERM CURRENT USE OF INSULIN (HCC): ICD-10-CM

## 2017-04-05 DIAGNOSIS — G62.9 NEUROPATHY: ICD-10-CM

## 2017-04-05 DIAGNOSIS — Z86.79 HISTORY OF THORACIC AORTIC ANEURYSM REPAIR: ICD-10-CM

## 2017-04-05 DIAGNOSIS — E78.5 DYSLIPIDEMIA: ICD-10-CM

## 2017-04-05 DIAGNOSIS — D69.6 THROMBOCYTOPENIA (HCC): ICD-10-CM

## 2017-04-05 DIAGNOSIS — E11.41 DIABETIC MONONEUROPATHY ASSOCIATED WITH TYPE 2 DIABETES MELLITUS (HCC): ICD-10-CM

## 2017-04-05 DIAGNOSIS — E55.9 VITAMIN D DEFICIENCY: ICD-10-CM

## 2017-04-05 DIAGNOSIS — I25.810 CORONARY ARTERY DISEASE INVOLVING CORONARY BYPASS GRAFT OF NATIVE HEART WITHOUT ANGINA PECTORIS: ICD-10-CM

## 2017-04-05 DIAGNOSIS — E11.59 UNCONTROLLED TYPE 2 DIABETES MELLITUS WITH OTHER CIRCULATORY COMPLICATION, WITHOUT LONG-TERM CURRENT USE OF INSULIN: ICD-10-CM

## 2017-04-05 DIAGNOSIS — E11.65 UNCONTROLLED TYPE 2 DIABETES MELLITUS WITH OTHER CIRCULATORY COMPLICATION, WITHOUT LONG-TERM CURRENT USE OF INSULIN: ICD-10-CM

## 2017-04-05 DIAGNOSIS — E04.1 THYROID NODULE: ICD-10-CM

## 2017-04-05 DIAGNOSIS — Z98.890 HISTORY OF THORACIC AORTIC ANEURYSM REPAIR: ICD-10-CM

## 2017-04-05 DIAGNOSIS — I26.92 ACUTE SADDLE PULMONARY EMBOLISM WITHOUT ACUTE COR PULMONALE (HCC): Primary | ICD-10-CM

## 2017-04-05 DIAGNOSIS — H53.2 DIPLOPIA: ICD-10-CM

## 2017-04-05 DIAGNOSIS — G95.9 MYELOPATHY (HCC): ICD-10-CM

## 2017-04-05 PROBLEM — R79.89 ELEVATED TROPONIN: Status: RESOLVED | Noted: 2017-03-29 | Resolved: 2017-04-05

## 2017-04-05 PROBLEM — R77.8 ELEVATED TROPONIN: Status: RESOLVED | Noted: 2017-03-29 | Resolved: 2017-04-05

## 2017-04-05 PROBLEM — R07.89 CHEST PAIN, ATYPICAL: Status: RESOLVED | Noted: 2017-03-29 | Resolved: 2017-04-05

## 2017-04-05 PROBLEM — IMO0002 UNCONTROLLED TYPE 2 DIABETES MELLITUS WITH CIRCULATORY DISORDER, WITHOUT LONG-TERM CURRENT USE OF INSULIN: Status: ACTIVE | Noted: 2017-04-05

## 2017-04-05 PROCEDURE — 99496 TRANSJ CARE MGMT HIGH F2F 7D: CPT | Performed by: FAMILY MEDICINE

## 2017-04-05 PROCEDURE — 72148 MRI LUMBAR SPINE W/O DYE: CPT

## 2017-04-05 PROCEDURE — 72141 MRI NECK SPINE W/O DYE: CPT

## 2017-04-05 RX ORDER — EPINEPHRINE 0.3 MG/.3ML
0.3 INJECTION SUBCUTANEOUS ONCE
Qty: 1 EACH | Refills: 0 | Status: SHIPPED | OUTPATIENT
Start: 2017-04-05 | End: 2017-04-05

## 2017-04-05 NOTE — PROGRESS NOTES
HPI:    Dennys Bhandari is a 75year old male here today for hospital follow up.    He was discharged from Inpatient hospital, [facility] to Home   Admit Date: 3/29/17  Discharge Date: 4/2/17  Hospital Discharge Diagnosis:    Saddle PE    Medicine by mouth daily. ONETOUCH LANCETS Test 6 times daily   Misc Natural Products (TOTAL CARDIO HEALTH FORMULA) Oral Cap Take 1 Cap by mouth daily. Atorvastatin Calcium 10 MG Oral Tab Take 1 tablet (10 mg total) by mouth nightly.    metoprolol Tartrate 25 MG has never smoked. He has never used smokeless tobacco. He reports that he does not drink alcohol or use illicit drugs.      ROS:   GENERAL: weight stable, energy stable, no sweating  SKIN: denies any unusual skin lesions  EYES: denies blurred vision or doub Refuses to take DM meds    Acute saddle pulmonary embolism without acute cor pulmonale (HCC)  -- currently on eliquis    Asthma with COPD (chronic obstructive pulmonary disease) ([facility] [address]  -- stable; AAP and ACT will be reviewed with the pt.     Eduar FREE  TSH  HGB A1C  MICROALB/CREAT RATIO, RANDOM URINE    Meds & Refills for this Visit:  Signed Prescriptions Disp Refills    EPINEPHrine (EPIPEN 2-BIENVENIDO) 0.3 MG/0.3ML Injection Solution Auto-injector 1 each 0      Sig: Inject 0.3 mL (1 each total) as direc

## 2017-04-06 ENCOUNTER — TELEPHONE (OUTPATIENT)
Dept: FAMILY MEDICINE CLINIC | Facility: CLINIC | Age: 67
End: 2017-04-06

## 2017-04-06 NOTE — TELEPHONE ENCOUNTER
Can a clinical person try to abstract as many of the supplements that he takes into his med list?  Thank you!!! Please see his Attivio message.

## 2017-04-07 ENCOUNTER — HOSPITAL ENCOUNTER (EMERGENCY)
Facility: HOSPITAL | Age: 67
Discharge: HOME | End: 2017-04-07
Attending: EMERGENCY MEDICINE
Payer: MEDICARE

## 2017-04-07 VITALS
SYSTOLIC BLOOD PRESSURE: 144 MMHG | HEART RATE: 79 BPM | OXYGEN SATURATION: 99 % | WEIGHT: 182 LBS | DIASTOLIC BLOOD PRESSURE: 92 MMHG | BODY MASS INDEX: 29 KG/M2 | RESPIRATION RATE: 20 BRPM | TEMPERATURE: 98 F

## 2017-04-07 DIAGNOSIS — I88.9 CERVICAL LYMPHADENITIS: Primary | ICD-10-CM

## 2017-04-07 PROCEDURE — 99282 EMERGENCY DEPT VISIT SF MDM: CPT

## 2017-04-08 NOTE — ED PROVIDER NOTES
Patient Seen in: [facility] Emergency Department    History   Patient presents with:  Sore Throat    Stated Complaint: rt jaw pain    HPI    Patient complaining of pain in the right jaw/throat region. Hurts to swallow.   Had a recent tooth in pain t times daily. BD PEN NEEDLE JADEN U/F 32G X 4 MM Does not apply Misc,  USE ONE SYRINGE TWICE DAILY   Irbesartan-Hydrochlorothiazide 150-12.5 MG Oral Tab,  Take 1 tablet by mouth every other day.      EPINEPHrine (EPIPEN 2-BIENVENIDO) 0.3 MG/0.3ML Injection Device, person, place, and time. He appears well-developed and well-nourished. Non-toxic appearance. No distress. HENT:   Head: Normocephalic and atraumatic. Eyes: Conjunctivae are normal. Pupils are equal, round, and reactive to light. No scleral icterus.

## 2017-04-08 NOTE — ED INITIAL ASSESSMENT (HPI)
67YM c/c of neck pain Pt state for the past week he been having a increased R sided neck pain.  Pt state that tonight he noticed a swollen node on his neck

## 2017-04-17 ENCOUNTER — PRIOR ORIGINAL RECORDS (OUTPATIENT)
Dept: OTHER | Age: 67
End: 2017-04-17

## 2017-04-17 ENCOUNTER — OFFICE VISIT (OUTPATIENT)
Dept: HEMATOLOGY/ONCOLOGY | Facility: HOSPITAL | Age: 67
End: 2017-04-17
Attending: INTERNAL MEDICINE
Payer: MEDICARE

## 2017-04-17 VITALS
DIASTOLIC BLOOD PRESSURE: 91 MMHG | TEMPERATURE: 99 F | OXYGEN SATURATION: 97 % | WEIGHT: 183 LBS | HEART RATE: 75 BPM | BODY MASS INDEX: 28.72 KG/M2 | RESPIRATION RATE: 18 BRPM | HEIGHT: 67.01 IN | SYSTOLIC BLOOD PRESSURE: 136 MMHG

## 2017-04-17 DIAGNOSIS — D69.6 THROMBOCYTOPENIA (HCC): ICD-10-CM

## 2017-04-17 DIAGNOSIS — N18.3 CKD (CHRONIC KIDNEY DISEASE), STAGE 3 (MODERATE): ICD-10-CM

## 2017-04-17 DIAGNOSIS — I26.92 ACUTE SADDLE PULMONARY EMBOLISM WITHOUT ACUTE COR PULMONALE (HCC): Primary | ICD-10-CM

## 2017-04-17 PROCEDURE — 99215 OFFICE O/P EST HI 40 MIN: CPT | Performed by: INTERNAL MEDICINE

## 2017-04-17 NOTE — PROGRESS NOTES
Patient here for post-hospital f/u. States feeling well since home. Only c/o bilat calf pain, persistent dry cough that increases when he talks.

## 2017-04-17 NOTE — PATIENT INSTRUCTIONS
For Dr. Kiarra Ralph nurse line, call [phone] with any questions or concerns Monday through Friday 8:00 to 4:30. After hours or weekends for emergent needs [phone].

## 2017-04-17 NOTE — PROGRESS NOTES
Hematology Clinic Follow Up Visit    Patient Name: Vivian Desouza  Medical Record Number: [id number]    YOB: 1950   PCP: Dr. Avel Mckeon    Reason for Consultation:  Vivian Desouza was seen today for the diagnosis of PE    Hematologi Comment aortic aneurysm repair    COLONOSCOPY  01/03/2011    CABG  2007    OTHER SURGICAL HISTORY  2/2011    Comment reimplantation of bypass grafts       Home Medications:    apixaban 5 MG Oral Tab Take 1 tablet (5 mg total) by mouth 2 (two) times kirby Disease Maternal Grandfather    • Heart Disease Maternal Grandmother    • Heart Disease Sister    • Hypertension Brother    • Hypertension Sister    • Thyroid Disorder Paternal Aunt    • Thyroid Disorder Mother    • Diabetes Brother    • DVT/VTE Neg CA 9.5 04/17/2017   ALKPHO 81 04/17/2017   AST 16 04/17/2017   ALT 25 04/17/2017   BILT 0.6 04/17/2017   TP 7.4 04/17/2017   ALB 3.6 04/17/2017    04/17/2017   K 4.6 04/17/2017    04/17/2017   CO2 29.0 04/17/2017       Lab Results  Component

## 2017-04-19 NOTE — PROGRESS NOTES
Quick Note:    [phone] (home) [phone] (work)  LMTCB- I think- it did not state that it was an answering machine    ______

## 2017-05-09 ENCOUNTER — HOSPITAL ENCOUNTER (OUTPATIENT)
Dept: CV DIAGNOSTICS | Facility: HOSPITAL | Age: 67
Discharge: HOME | End: 2017-05-09
Attending: INTERNAL MEDICINE
Payer: MEDICARE

## 2017-05-09 DIAGNOSIS — I44.7 LBBB (LEFT BUNDLE BRANCH BLOCK): ICD-10-CM

## 2017-05-09 PROCEDURE — 93226 XTRNL ECG REC<48 HR SCAN A/R: CPT | Performed by: INTERNAL MEDICINE

## 2017-05-09 PROCEDURE — 93227 XTRNL ECG REC<48 HR R&I: CPT | Performed by: INTERNAL MEDICINE

## 2017-05-09 PROCEDURE — 93225 XTRNL ECG REC<48 HRS REC: CPT | Performed by: INTERNAL MEDICINE

## 2017-05-15 ENCOUNTER — LAB ENCOUNTER (OUTPATIENT)
Dept: LAB | Facility: HOSPITAL | Age: 67
End: 2017-05-15
Attending: INTERNAL MEDICINE
Payer: MEDICARE

## 2017-05-15 ENCOUNTER — PRIOR ORIGINAL RECORDS (OUTPATIENT)
Dept: OTHER | Age: 67
End: 2017-05-15

## 2017-05-15 DIAGNOSIS — E78.00 PURE HYPERCHOLESTEROLEMIA: Primary | ICD-10-CM

## 2017-05-15 PROCEDURE — 80053 COMPREHEN METABOLIC PANEL: CPT

## 2017-05-15 PROCEDURE — 36415 COLL VENOUS BLD VENIPUNCTURE: CPT

## 2017-05-15 PROCEDURE — 80061 LIPID PANEL: CPT

## 2017-05-16 LAB
ALBUMIN: 3.8 G/DL
ALKALINE PHOSPHATATE(ALK PHOS): 73 IU/L
ALT (SGPT): 23 U/L
AST (SGOT): 12 U/L
BILIRUBIN TOTAL: 0.6 MG/DL
BUN: 34 MG/DL
CALCIUM: 9 MG/DL
CHLORIDE: 105 MEQ/L
CHOLESTEROL, TOTAL: 164 MG/DL
CREATININE, SERUM: 1.58 MG/DL
GLUCOSE: 155 MG/DL
GLUCOSE: 155 MG/DL
HDL CHOLESTEROL: 39 MG/DL
LDL CHOLESTEROL: 97 MG/DL
NON-HDL CHOLESTEROL: 125 MG/DL
POTASSIUM, SERUM: 4 MEQ/L
PROTEIN, TOTAL: 7.2 G/DL
SGOT (AST): 12 IU/L
SGPT (ALT): 23 IU/L
SODIUM: 139 MEQ/L
TRIGLYCERIDES: 141 MG/DL

## 2017-05-22 LAB
ALBUMIN: 3.6 G/DL
ALKALINE PHOSPHATATE(ALK PHOS): 81 IU/L
BILIRUBIN TOTAL: 0.6 MG/DL
BUN: 30 MG/DL
BUN: 38 MG/DL
CALCIUM: 8.8 MG/DL
CALCIUM: 9.5 MG/DL
CHLORIDE: 103 MEQ/L
CHLORIDE: 108 MEQ/L
CREATININE, SERUM: 1.75 MG/DL
CREATININE, SERUM: 1.8 MG/DL
GLUCOSE: 117 MG/DL
GLUCOSE: 158 MG/DL
HEMATOCRIT: 43.2 %
HEMOGLOBIN: 14.1 G/DL
PLATELETS: 212 K/UL
POTASSIUM, SERUM: 4.2 MEQ/L
POTASSIUM, SERUM: 4.6 MEQ/L
PROTEIN, TOTAL: 7.4 G/DL
RED BLOOD COUNT: 4.54 X 10-6/U
SGOT (AST): 16 IU/L
SGPT (ALT): 25 IU/L
SODIUM: 139 MEQ/L
SODIUM: 141 MEQ/L
WHITE BLOOD COUNT: 7.7 X 10-3/U

## 2017-06-01 ENCOUNTER — HOSPITAL (OUTPATIENT)
Dept: OTHER | Age: 67
End: 2017-06-01
Attending: INTERNAL MEDICINE

## 2017-06-01 ENCOUNTER — PRIOR ORIGINAL RECORDS (OUTPATIENT)
Dept: OTHER | Age: 67
End: 2017-06-01

## 2017-06-16 ENCOUNTER — OFFICE VISIT (OUTPATIENT)
Dept: FAMILY MEDICINE CLINIC | Facility: CLINIC | Age: 67
End: 2017-06-16

## 2017-06-16 ENCOUNTER — HOSPITAL ENCOUNTER (OUTPATIENT)
Dept: CV DIAGNOSTICS | Facility: HOSPITAL | Age: 67
Discharge: HOME | End: 2017-06-16
Attending: INTERNAL MEDICINE
Payer: MEDICARE

## 2017-06-16 ENCOUNTER — LAB ENCOUNTER (OUTPATIENT)
Dept: LAB | Age: 67
End: 2017-06-16
Attending: FAMILY MEDICINE
Payer: MEDICARE

## 2017-06-16 ENCOUNTER — PRIOR ORIGINAL RECORDS (OUTPATIENT)
Dept: OTHER | Age: 67
End: 2017-06-16

## 2017-06-16 VITALS
WEIGHT: 184 LBS | DIASTOLIC BLOOD PRESSURE: 80 MMHG | HEIGHT: 67 IN | RESPIRATION RATE: 14 BRPM | HEART RATE: 68 BPM | SYSTOLIC BLOOD PRESSURE: 130 MMHG | BODY MASS INDEX: 28.88 KG/M2

## 2017-06-16 DIAGNOSIS — E04.1 THYROID NODULE: ICD-10-CM

## 2017-06-16 DIAGNOSIS — E78.5 DYSLIPIDEMIA: ICD-10-CM

## 2017-06-16 DIAGNOSIS — E11.65 UNCONTROLLED TYPE 2 DIABETES MELLITUS WITH OTHER CIRCULATORY COMPLICATION, WITHOUT LONG-TERM CURRENT USE OF INSULIN: ICD-10-CM

## 2017-06-16 DIAGNOSIS — E11.41 DIABETIC MONONEUROPATHY ASSOCIATED WITH TYPE 2 DIABETES MELLITUS (HCC): ICD-10-CM

## 2017-06-16 DIAGNOSIS — D69.6 THROMBOCYTOPENIA (HCC): ICD-10-CM

## 2017-06-16 DIAGNOSIS — R94.31 ABNORMAL ELECTROCARDIOGRAM: ICD-10-CM

## 2017-06-16 DIAGNOSIS — E11.22 TYPE 2 DIABETES MELLITUS WITH STAGE 3 CHRONIC KIDNEY DISEASE, WITHOUT LONG-TERM CURRENT USE OF INSULIN (HCC): ICD-10-CM

## 2017-06-16 DIAGNOSIS — E11.9 TYPE 2 DIABETES MELLITUS WITHOUT COMPLICATION, WITHOUT LONG-TERM CURRENT USE OF INSULIN (HCC): ICD-10-CM

## 2017-06-16 DIAGNOSIS — E11.59 UNCONTROLLED TYPE 2 DIABETES MELLITUS WITH OTHER CIRCULATORY COMPLICATION, WITHOUT LONG-TERM CURRENT USE OF INSULIN: ICD-10-CM

## 2017-06-16 DIAGNOSIS — I10 ESSENTIAL HYPERTENSION: ICD-10-CM

## 2017-06-16 DIAGNOSIS — E11.65 UNCONTROLLED TYPE 2 DIABETES MELLITUS WITH OTHER CIRCULATORY COMPLICATION, WITHOUT LONG-TERM CURRENT USE OF INSULIN: Primary | ICD-10-CM

## 2017-06-16 DIAGNOSIS — N18.30 TYPE 2 DIABETES MELLITUS WITH STAGE 3 CHRONIC KIDNEY DISEASE, WITHOUT LONG-TERM CURRENT USE OF INSULIN (HCC): ICD-10-CM

## 2017-06-16 DIAGNOSIS — E55.9 VITAMIN D DEFICIENCY: ICD-10-CM

## 2017-06-16 DIAGNOSIS — E11.59 UNCONTROLLED TYPE 2 DIABETES MELLITUS WITH OTHER CIRCULATORY COMPLICATION, WITHOUT LONG-TERM CURRENT USE OF INSULIN: Primary | ICD-10-CM

## 2017-06-16 PROBLEM — J44.9 ASTHMA WITH COPD (CHRONIC OBSTRUCTIVE PULMONARY DISEASE) (HCC): Chronic | Status: ACTIVE | Noted: 2017-06-16

## 2017-06-16 PROBLEM — J44.9 ASTHMA WITH COPD (CHRONIC OBSTRUCTIVE PULMONARY DISEASE) (HCC): Chronic | Status: RESOLVED | Noted: 2017-03-29 | Resolved: 2017-06-16

## 2017-06-16 PROBLEM — J44.89 ASTHMA WITH COPD (CHRONIC OBSTRUCTIVE PULMONARY DISEASE): Chronic | Status: ACTIVE | Noted: 2017-06-16

## 2017-06-16 PROBLEM — J44.89 ASTHMA WITH COPD (CHRONIC OBSTRUCTIVE PULMONARY DISEASE): Chronic | Status: RESOLVED | Noted: 2017-03-29 | Resolved: 2017-06-16

## 2017-06-16 PROCEDURE — 82570 ASSAY OF URINE CREATININE: CPT | Performed by: FAMILY MEDICINE

## 2017-06-16 PROCEDURE — 36415 COLL VENOUS BLD VENIPUNCTURE: CPT | Performed by: FAMILY MEDICINE

## 2017-06-16 PROCEDURE — 80053 COMPREHEN METABOLIC PANEL: CPT | Performed by: FAMILY MEDICINE

## 2017-06-16 PROCEDURE — 84443 ASSAY THYROID STIM HORMONE: CPT | Performed by: FAMILY MEDICINE

## 2017-06-16 PROCEDURE — 82306 VITAMIN D 25 HYDROXY: CPT | Performed by: FAMILY MEDICINE

## 2017-06-16 PROCEDURE — 93306 TTE W/DOPPLER COMPLETE: CPT | Performed by: INTERNAL MEDICINE

## 2017-06-16 PROCEDURE — 84439 ASSAY OF FREE THYROXINE: CPT | Performed by: FAMILY MEDICINE

## 2017-06-16 PROCEDURE — 83036 HEMOGLOBIN GLYCOSYLATED A1C: CPT | Performed by: FAMILY MEDICINE

## 2017-06-16 PROCEDURE — 80061 LIPID PANEL: CPT | Performed by: FAMILY MEDICINE

## 2017-06-16 PROCEDURE — 99214 OFFICE O/P EST MOD 30 MIN: CPT | Performed by: FAMILY MEDICINE

## 2017-06-16 PROCEDURE — 85025 COMPLETE CBC W/AUTO DIFF WBC: CPT | Performed by: FAMILY MEDICINE

## 2017-06-16 PROCEDURE — 82043 UR ALBUMIN QUANTITATIVE: CPT | Performed by: FAMILY MEDICINE

## 2017-06-16 RX ORDER — EPINEPHRINE 0.3 MG/.3ML
INJECTION INTRAMUSCULAR
Refills: 0 | COMMUNITY
Start: 2017-04-26 | End: 2017-06-16

## 2017-06-16 RX ORDER — IRBESARTAN AND HYDROCHLOROTHIAZIDE 150; 12.5 MG/1; MG/1
1 TABLET, FILM COATED ORAL DAILY
Refills: 1 | COMMUNITY
Start: 2017-05-26 | End: 2017-06-26

## 2017-06-16 NOTE — PROGRESS NOTES
HPI:   Mikey Sutton is a 75year old male who presents for recheck of his diabetes. Patient’s FBS have been 105-130's. Last visit with ophthalmologist was 12 months ago. Pt.has been checking his feet on a regular basis.  Pt denies any tingling of the mg/dL   GFR 44 (L) >=60   Calcium, Total 9.0 8.3-10.3 mg/dL   Alkaline Phosphatase 73  U/L   AST 12 (L) 15-41 U/L   Alt 23 17-63 U/L   Bilirubin, Total 0.6 0.1-2.0 mg/dL   Total Protein 7.2 6.1-8.3 g/dL   Albumin 3.8 3.5-4.8 g/dL   Sodium 139 136-144 calculi    • Overweight(278.02)    • Screening for other and unspecified endocrine, nutritional, metabolic, and immunity disorders    • Screening for other and unspecified genitourinary condition    • Screening for iron deficiency anemia    • Special scree bilaterally    ASSESSMENT AND PLAN:   Teresa Gomez is a 75year old male who presents for a recheck of his diabetes, HTN, and dyslipidemia. Diabetic control is moderate.   Recommendations are: continue present meds, check HgbA1C, check fasting lipid

## 2017-06-19 ENCOUNTER — TELEPHONE (OUTPATIENT)
Dept: FAMILY MEDICINE CLINIC | Facility: CLINIC | Age: 67
End: 2017-06-19

## 2017-06-19 DIAGNOSIS — R79.9 ABNORMAL BLOOD FINDINGS: Primary | ICD-10-CM

## 2017-06-19 LAB
ALBUMIN: 3.9 G/DL
ALKALINE PHOSPHATATE(ALK PHOS): 68 IU/L
ALT (SGPT): 34 U/L
AST (SGOT): 21 U/L
BILIRUBIN TOTAL: 0.5 MG/DL
BUN: 28 MG/DL
CALCIUM: 9.3 MG/DL
CHLORIDE: 105 MEQ/L
CHOLESTEROL, TOTAL: 207 MG/DL
CREATININE, SERUM: 1.7 MG/DL
FREE T4: 1 MG/DL
GLUCOSE: 141 MG/DL
GLUCOSE: 141 MG/DL
HDL CHOLESTEROL: 45 MG/DL
HEMATOCRIT: 48.8 %
HEMOGLOBIN A1C: 7.5 %
HEMOGLOBIN: 15.9 G/DL
LDL CHOLESTEROL: 121 MG/DL
NON-HDL CHOLESTEROL: 162 MG/DL
PLATELETS: 157 K/UL
POTASSIUM, SERUM: 4.7 MEQ/L
PROTEIN, TOTAL: 7.7 G/DL
RED BLOOD COUNT: 5.21 X 10-6/U
SGOT (AST): 21 IU/L
SGPT (ALT): 34 IU/L
SODIUM: 140 MEQ/L
THYROID STIMULATING HORMONE: 1.86 MLU/L
TRIGLYCERIDES: 203 MG/DL
VITAMIN D 25-OH: 40.9 NG/ML
WHITE BLOOD COUNT: 5.6 X 10-3/U

## 2017-06-20 ENCOUNTER — PRIOR ORIGINAL RECORDS (OUTPATIENT)
Dept: OTHER | Age: 67
End: 2017-06-20

## 2017-06-21 ENCOUNTER — PRIOR ORIGINAL RECORDS (OUTPATIENT)
Dept: OTHER | Age: 67
End: 2017-06-21

## 2017-06-26 ENCOUNTER — HOSPITAL ENCOUNTER (OUTPATIENT)
Dept: CT IMAGING | Facility: HOSPITAL | Age: 67
Discharge: HOME | End: 2017-06-26
Attending: INTERNAL MEDICINE
Payer: MEDICARE

## 2017-06-26 ENCOUNTER — OFFICE VISIT (OUTPATIENT)
Dept: HEMATOLOGY/ONCOLOGY | Facility: HOSPITAL | Age: 67
End: 2017-06-26
Attending: INTERNAL MEDICINE
Payer: MEDICARE

## 2017-06-26 VITALS
DIASTOLIC BLOOD PRESSURE: 73 MMHG | WEIGHT: 184.38 LBS | HEART RATE: 67 BPM | OXYGEN SATURATION: 97 % | SYSTOLIC BLOOD PRESSURE: 129 MMHG | HEIGHT: 67.01 IN | BODY MASS INDEX: 28.94 KG/M2 | RESPIRATION RATE: 16 BRPM

## 2017-06-26 VITALS
HEART RATE: 60 BPM | SYSTOLIC BLOOD PRESSURE: 133 MMHG | OXYGEN SATURATION: 99 % | RESPIRATION RATE: 15 BRPM | DIASTOLIC BLOOD PRESSURE: 87 MMHG

## 2017-06-26 DIAGNOSIS — I25.10 CAD (CORONARY ARTERY DISEASE): ICD-10-CM

## 2017-06-26 DIAGNOSIS — N18.30 STAGE 3 CHRONIC KIDNEY DISEASE (HCC): ICD-10-CM

## 2017-06-26 DIAGNOSIS — Z95.1 S/P CABG X 1: ICD-10-CM

## 2017-06-26 DIAGNOSIS — I26.92 ACUTE SADDLE PULMONARY EMBOLISM WITHOUT ACUTE COR PULMONALE (HCC): Primary | ICD-10-CM

## 2017-06-26 DIAGNOSIS — Z79.01 CHRONIC ANTICOAGULATION: ICD-10-CM

## 2017-06-26 DIAGNOSIS — I50.22 CHRONIC SYSTOLIC HEART FAILURE (HCC): ICD-10-CM

## 2017-06-26 PROCEDURE — 75574 CT ANGIO HRT W/3D IMAGE: CPT | Performed by: INTERNAL MEDICINE

## 2017-06-26 PROCEDURE — 99215 OFFICE O/P EST HI 40 MIN: CPT | Performed by: INTERNAL MEDICINE

## 2017-06-26 RX ORDER — NITROGLYCERIN 0.4 MG/1
TABLET SUBLINGUAL
Status: COMPLETED
Start: 2017-06-26 | End: 2017-06-26

## 2017-06-26 RX ORDER — SODIUM CHLORIDE 9 MG/ML
250 INJECTION, SOLUTION INTRAVENOUS CONTINUOUS
Status: ACTIVE | OUTPATIENT
Start: 2017-06-26 | End: 2017-06-26

## 2017-06-26 RX ADMIN — NITROGLYCERIN 0.4 MG: 0.4 TABLET SUBLINGUAL at 10:37:00

## 2017-06-26 RX ADMIN — SODIUM CHLORIDE 250 ML: 9 INJECTION, SOLUTION INTRAVENOUS at 08:50:00

## 2017-06-26 NOTE — PROGRESS NOTES
Hematology Clinic Follow Up Visit    Patient Name: Marco Vidales  Medical Record Number: [id number]    YOB: 1950   PCP: Dr. Analisa De Leon    Reason for Consultation:  Marco Vidales was seen today for the diagnosis of PE    Hematologi genitourinary condition    • Screening for thyroid disorder    • Special screening for malignant neoplasm of prostate      Past Surgical History:  2007: CABG  01/03/2011: COLONOSCOPY  No date: OTHER SURGICAL HISTORY      Comment: aortic aneurysm repair  2/ homeless veterans       Family Medical History:  Family History   Problem Relation Age of Onset   • Heart Attack Mother    • Alcohol and Other Disorders Associated Father    • Cancer Paternal Grandfather    • Diabetes Father    • Substance Abuse Brother .0 04/17/2017   .0 (L) 04/02/2017       Lab Results  Component Value Date    (H) 06/16/2017   BUN 28 (H) 06/16/2017   CREATSERUM 1.70 (H) 06/16/2017   CREATSERUM 1.58 (H) 05/15/2017   CREATSERUM 1.75 (H) 04/17/2017   GFR 41 (L) 06/16 minutes face to face with the patient. More than 50% of that time was spent counseling the patient on the diagnosis, prognosis, and treatment options available and on coordination of care.        Colten Mayfield MD  Hematology/Medical Oncology  [facility]

## 2017-06-26 NOTE — IMAGING NOTE
Patient tolerated hydration procedure (infused total 250 ml saline x 1 hour). Patient sitting in a recliner chair, talking to female , denies shortness of breath or chest pain. Ct Scan unit aware the need for procedure.

## 2017-06-26 NOTE — PROGRESS NOTES
Patient here for MD f/u. Early appt to discuss eliquis. He has been taking as prescribed but has some questions about medication he would like to discuss with MD.  Side effects, etc.  He c/o afternoon fatigue that he believes is related to the eliquis.

## 2017-06-26 NOTE — PATIENT INSTRUCTIONS
For Dr. Skelton Heart nurse line, call [phone] with any questions or concerns Monday through Friday 8:00 to 4:30. After hours or weekends for emergent needs [phone].

## 2017-06-28 ENCOUNTER — PRIOR ORIGINAL RECORDS (OUTPATIENT)
Dept: OTHER | Age: 67
End: 2017-06-28

## 2017-06-28 PROBLEM — Z79.01 CHRONIC ANTICOAGULATION: Status: ACTIVE | Noted: 2017-06-28

## 2017-08-23 ENCOUNTER — PRIOR ORIGINAL RECORDS (OUTPATIENT)
Dept: OTHER | Age: 67
End: 2017-08-23

## 2017-09-18 ENCOUNTER — OFFICE VISIT (OUTPATIENT)
Dept: FAMILY MEDICINE CLINIC | Facility: CLINIC | Age: 67
End: 2017-09-18

## 2017-09-18 VITALS
SYSTOLIC BLOOD PRESSURE: 130 MMHG | BODY MASS INDEX: 29.06 KG/M2 | WEIGHT: 183 LBS | HEIGHT: 66.5 IN | DIASTOLIC BLOOD PRESSURE: 78 MMHG | HEART RATE: 66 BPM

## 2017-09-18 DIAGNOSIS — N40.0 BENIGN PROSTATIC HYPERPLASIA WITHOUT LOWER URINARY TRACT SYMPTOMS: ICD-10-CM

## 2017-09-18 DIAGNOSIS — R80.9 PROTEINURIA, UNSPECIFIED TYPE: ICD-10-CM

## 2017-09-18 DIAGNOSIS — Z23 NEED FOR VACCINATION: ICD-10-CM

## 2017-09-18 DIAGNOSIS — I10 ESSENTIAL HYPERTENSION: ICD-10-CM

## 2017-09-18 DIAGNOSIS — H52.00 HYPERMETROPIA, UNSPECIFIED LATERALITY: ICD-10-CM

## 2017-09-18 DIAGNOSIS — Z86.711 HISTORY OF PULMONARY EMBOLISM: ICD-10-CM

## 2017-09-18 DIAGNOSIS — E11.41 DIABETIC MONONEUROPATHY ASSOCIATED WITH TYPE 2 DIABETES MELLITUS (HCC): ICD-10-CM

## 2017-09-18 DIAGNOSIS — Z13.31 DEPRESSION SCREENING: ICD-10-CM

## 2017-09-18 DIAGNOSIS — H52.4 PRESBYOPIA: ICD-10-CM

## 2017-09-18 DIAGNOSIS — E29.1 HYPOGONADISM MALE: ICD-10-CM

## 2017-09-18 DIAGNOSIS — E66.3 OVERWEIGHT (BMI 25.0-29.9): ICD-10-CM

## 2017-09-18 DIAGNOSIS — H25.10 NUCLEAR SENILE CATARACT, UNSPECIFIED LATERALITY: ICD-10-CM

## 2017-09-18 DIAGNOSIS — H25.019 CORTICAL SENILE CATARACT, UNSPECIFIED LATERALITY: ICD-10-CM

## 2017-09-18 DIAGNOSIS — Z79.01 CHRONIC ANTICOAGULATION: ICD-10-CM

## 2017-09-18 DIAGNOSIS — K57.30 DIVERTICULOSIS OF LARGE INTESTINE WITHOUT HEMORRHAGE: ICD-10-CM

## 2017-09-18 DIAGNOSIS — N18.30 STAGE 3 CHRONIC KIDNEY DISEASE (HCC): ICD-10-CM

## 2017-09-18 DIAGNOSIS — Z95.2 S/P AVR: ICD-10-CM

## 2017-09-18 DIAGNOSIS — Z86.79 HISTORY OF THORACIC AORTIC ANEURYSM REPAIR: ICD-10-CM

## 2017-09-18 DIAGNOSIS — E11.9 TYPE 2 DIABETES MELLITUS WITHOUT COMPLICATION, WITHOUT LONG-TERM CURRENT USE OF INSULIN (HCC): ICD-10-CM

## 2017-09-18 DIAGNOSIS — J45.20 MILD INTERMITTENT EXTRINSIC ASTHMA WITHOUT COMPLICATION: ICD-10-CM

## 2017-09-18 DIAGNOSIS — L82.1 OTHER SEBORRHEIC KERATOSIS: ICD-10-CM

## 2017-09-18 DIAGNOSIS — I25.810 CORONARY ARTERY DISEASE INVOLVING CORONARY BYPASS GRAFT OF NATIVE HEART WITHOUT ANGINA PECTORIS: ICD-10-CM

## 2017-09-18 DIAGNOSIS — N18.30 TYPE 2 DIABETES MELLITUS WITH STAGE 3 CHRONIC KIDNEY DISEASE, WITHOUT LONG-TERM CURRENT USE OF INSULIN (HCC): ICD-10-CM

## 2017-09-18 DIAGNOSIS — E11.59 UNCONTROLLED TYPE 2 DIABETES MELLITUS WITH OTHER CIRCULATORY COMPLICATION, WITHOUT LONG-TERM CURRENT USE OF INSULIN: ICD-10-CM

## 2017-09-18 DIAGNOSIS — E78.5 DYSLIPIDEMIA: ICD-10-CM

## 2017-09-18 DIAGNOSIS — H04.129 TEAR FILM INSUFFICIENCY, UNSPECIFIED LATERALITY: ICD-10-CM

## 2017-09-18 DIAGNOSIS — Z00.00 ENCOUNTER FOR ANNUAL HEALTH EXAMINATION: Primary | ICD-10-CM

## 2017-09-18 DIAGNOSIS — E11.22 TYPE 2 DIABETES MELLITUS WITH STAGE 3 CHRONIC KIDNEY DISEASE, WITHOUT LONG-TERM CURRENT USE OF INSULIN (HCC): ICD-10-CM

## 2017-09-18 DIAGNOSIS — E55.9 VITAMIN D DEFICIENCY: ICD-10-CM

## 2017-09-18 DIAGNOSIS — Z98.890 HISTORY OF THORACIC AORTIC ANEURYSM REPAIR: ICD-10-CM

## 2017-09-18 DIAGNOSIS — E04.1 THYROID NODULE: ICD-10-CM

## 2017-09-18 DIAGNOSIS — Z11.59 NEED FOR HEPATITIS C SCREENING TEST: ICD-10-CM

## 2017-09-18 DIAGNOSIS — E11.65 UNCONTROLLED TYPE 2 DIABETES MELLITUS WITH OTHER CIRCULATORY COMPLICATION, WITHOUT LONG-TERM CURRENT USE OF INSULIN: ICD-10-CM

## 2017-09-18 DIAGNOSIS — Z12.11 SCREENING FOR COLON CANCER: ICD-10-CM

## 2017-09-18 PROBLEM — I26.92 ACUTE SADDLE PULMONARY EMBOLISM WITHOUT ACUTE COR PULMONALE (HCC): Status: RESOLVED | Noted: 2017-03-29 | Resolved: 2017-09-18

## 2017-09-18 PROBLEM — J44.9 ASTHMA WITH COPD (CHRONIC OBSTRUCTIVE PULMONARY DISEASE) (HCC): Chronic | Status: RESOLVED | Noted: 2017-06-16 | Resolved: 2017-09-18

## 2017-09-18 PROBLEM — J44.89 ASTHMA WITH COPD (CHRONIC OBSTRUCTIVE PULMONARY DISEASE): Chronic | Status: RESOLVED | Noted: 2017-06-16 | Resolved: 2017-09-18

## 2017-09-18 PROCEDURE — 99214 OFFICE O/P EST MOD 30 MIN: CPT | Performed by: FAMILY MEDICINE

## 2017-09-18 PROCEDURE — G0438 PPPS, INITIAL VISIT: HCPCS | Performed by: FAMILY MEDICINE

## 2017-09-18 RX ORDER — METOPROLOL SUCCINATE 50 MG/1
50 TABLET, EXTENDED RELEASE ORAL DAILY
COMMUNITY
Start: 2017-07-03 | End: 2018-09-22

## 2017-09-18 RX ORDER — IRBESARTAN AND HYDROCHLOROTHIAZIDE 150; 12.5 MG/1; MG/1
1 TABLET, FILM COATED ORAL DAILY
Refills: 2 | COMMUNITY
Start: 2017-08-23 | End: 2018-09-22

## 2017-09-18 NOTE — PROGRESS NOTES
HPI:   Mey Rodríguez is a 75year old male who presents for a Medicare Subsequent Annual Wellness visit (Pt already had Initial Annual Wellness). Pt. Is here for AWV. He has been on Medicare since age 71. He is also due for a med check.     His CA 9.3 06/16/2017   ALB 3.9 06/16/2017   TSH 1.860 06/16/2017   CREATSERUM 1.70 (H) 06/16/2017    (H) 06/16/2017        CBC  (most recent labs)     Lab Results  Component Value Date   WBC 5.6 06/16/2017   HGB 15.9 06/16/2017   .0 06/16/2017 tobacco. He reports that he does not drink alcohol or use drugs.      REVIEW OF SYSTEMS:   GENERAL: feels well otherwise  SKIN: denies any unusual skin lesions  EYES: denies blurred vision or double vision  HEENT: denies nasal congestion, sinus pain or ST examination  - -done today    Screening for colon cancer  -     Cancel: EVALUATE & TREAT, GASTRO (INTERNAL)    Depression screening  -     DEPRESSION SCREEN ANNUAL    Need for vaccination  -     PNEUMOCOCCAL VACC, 13 PATITO IM    Need for hepatitis C screenin diabetes mellitus ([facility] [address]  - stable    Mild intermittent extrinsic asthma without complication  - -stable; AAPand ACT are UTD    Vitamin D deficiency  -- stable    Hypogonadism male  -- stable         Last eye exam 8/23/17. Last dental exam -- due.   States Pneumococcal?: No     Functional Ability     Bathing or Showering: Able without help    Toileting: Able without help    Dressing: Able without help    Eating: Able without help    Driving: Able without help    Preparing your meals: Able without help    Man SERVICES  INDICATIONS AND SCHEDULE Internal Lab or Procedure External Lab or Procedure   Diabetes Screening      HbgA1C   Annually HGBA1C (%)   Date Value   06/04/2014 6.6 (H)     HgbA1C (%)   Date Value   06/16/2017 7.5 (H)       No flowsheet data found. placed or performed in visit on 04/27/15  -HEPATITIS B VACCINE, OVER 20        Tetanus No orders found for this or any previous visit.          SPECIFIC DISEASE MONITORING Internal Lab or Procedure External Lab or Procedure   Annual Monitoring of Persistent medicine as directed. He is walking now. Does plant enzymes 3 times a day and added, raw egg, hemp, prebiotic, Armenian bee pollen. Eats a lot of fruit and that increases the sugar, so added the raw eggs.       Wt Readings from Last 6 Encounters:  09/18/1 - 17.0 g/dL   HCT 48.8 37.0 - 53.0 %   .0 150.0 - 450.0 10(3)uL   MCV 93.7 80.0 - 99.0 fL   MCH 30.5 27.0 - 33.2 pg   MCHC 32.6 31.0 - 37.0 g/dL   RDW 13.2 11.5 - 16.0 %   RDW-SD 45.0 35.1 - 46.3 fL   Neutrophil Absolute Prelim 3.44 1.30 - 6.70 x10 superficial foreign body (splinter), without major open wound, infected    • Overweight(278.02)    • Personal history of other diseases of circulatory system    • Personal history of urinary calculi    • Screening for iron deficiency anemia    • Screening edema; removed shoes and socks  -- 2+ dorsalis pedis pulses bilaterally; skin of the feet examined and intact bilaterally  NEURO: sensation is intact to monofilament bilaterally    ASSESSMENT AND PLAN:   Griffin Deras is a 75year old male who present native heart without angina pectoris  Thyroid nodule  Diabetic mononeuropathy associated with type 2 diabetes mellitus (hcc)  Mild intermittent extrinsic asthma without complication  Vitamin d deficiency  Hypogonadism male     See A/P above in AWV note.

## 2017-09-18 NOTE — PATIENT INSTRUCTIONS
Zulema Ang III's SCREENING SCHEDULE   Tests on this list are recommended by your physician but may not be covered, or covered at this frequency, by your insurer. Please check with your insurance carrier before scheduling to verify coverage.     PREVEN for this or any previous visit.  Limited to patients who meet one of the following criteria:   • Men who are 73-68 years old and have smoked more than 100 cigarettes in their lifetime   • Anyone with a family history    Colorectal Cancer Screening Covered u 08/02/16  -HEPATITIS B VACCINE, OVER 20   Orders placed or performed in visit on 06/01/15  -HEPATITIS B VACCINE, OVER 20   Orders placed or performed in visit on 04/27/15  -HEPATITIS B VACCINE, OVER 20    Medium/high risk factors:   End-stage renal disease

## 2017-09-25 ENCOUNTER — OFFICE VISIT (OUTPATIENT)
Dept: HEMATOLOGY/ONCOLOGY | Facility: HOSPITAL | Age: 67
End: 2017-09-25
Attending: INTERNAL MEDICINE
Payer: MEDICARE

## 2017-10-02 ENCOUNTER — APPOINTMENT (OUTPATIENT)
Dept: HEMATOLOGY/ONCOLOGY | Facility: HOSPITAL | Age: 67
End: 2017-10-02
Attending: INTERNAL MEDICINE
Payer: MEDICARE

## 2017-10-05 ENCOUNTER — OFFICE VISIT (OUTPATIENT)
Dept: HEMATOLOGY/ONCOLOGY | Facility: HOSPITAL | Age: 67
End: 2017-10-05
Payer: MEDICARE

## 2017-10-24 ENCOUNTER — HOSPITAL ENCOUNTER (INPATIENT)
Facility: HOSPITAL | Age: 67
LOS: 3 days | Discharge: HOME | DRG: 227 | End: 2017-10-28
Attending: EMERGENCY MEDICINE | Admitting: HOSPITALIST
Payer: MEDICARE

## 2017-10-24 ENCOUNTER — APPOINTMENT (OUTPATIENT)
Dept: CT IMAGING | Facility: HOSPITAL | Age: 67
DRG: 227 | End: 2017-10-24
Attending: EMERGENCY MEDICINE
Payer: MEDICARE

## 2017-10-24 ENCOUNTER — APPOINTMENT (OUTPATIENT)
Dept: GENERAL RADIOLOGY | Facility: HOSPITAL | Age: 67
DRG: 227 | End: 2017-10-24
Payer: MEDICARE

## 2017-10-24 DIAGNOSIS — R77.8 ELEVATED TROPONIN: Primary | ICD-10-CM

## 2017-10-24 PROBLEM — R79.89 ELEVATED TROPONIN: Status: ACTIVE | Noted: 2017-10-24

## 2017-10-24 PROCEDURE — 71010 XR CHEST AP PORTABLE  (CPT=71010): CPT | Performed by: EMERGENCY MEDICINE

## 2017-10-24 PROCEDURE — 71275 CT ANGIOGRAPHY CHEST: CPT | Performed by: EMERGENCY MEDICINE

## 2017-10-25 ENCOUNTER — APPOINTMENT (OUTPATIENT)
Dept: CV DIAGNOSTICS | Facility: HOSPITAL | Age: 67
DRG: 227 | End: 2017-10-25
Attending: HOSPITALIST
Payer: MEDICARE

## 2017-10-25 PROCEDURE — 93306 TTE W/DOPPLER COMPLETE: CPT | Performed by: HOSPITALIST

## 2017-10-25 PROCEDURE — 99223 1ST HOSP IP/OBS HIGH 75: CPT | Performed by: HOSPITALIST

## 2017-10-25 RX ORDER — NITROGLYCERIN 0.4 MG/1
0.4 TABLET SUBLINGUAL EVERY 5 MIN PRN
Status: DISCONTINUED | OUTPATIENT
Start: 2017-10-25 | End: 2017-10-28

## 2017-10-25 RX ORDER — METOPROLOL SUCCINATE 50 MG/1
50 TABLET, EXTENDED RELEASE ORAL
Status: DISCONTINUED | OUTPATIENT
Start: 2017-10-25 | End: 2017-10-28

## 2017-10-25 RX ORDER — ASPIRIN 325 MG
325 TABLET ORAL DAILY
Status: DISCONTINUED | OUTPATIENT
Start: 2017-10-25 | End: 2017-10-28

## 2017-10-25 RX ORDER — IRBESARTAN AND HYDROCHLOROTHIAZIDE 150; 12.5 MG/1; MG/1
1 TABLET, FILM COATED ORAL DAILY
Status: DISCONTINUED | OUTPATIENT
Start: 2017-10-25 | End: 2017-10-25 | Stop reason: RX

## 2017-10-25 NOTE — HISTORICAL OFFICE NOTE
Juan Carlos De León  : 1950  ACCOUNT:  [id number]  [phone]  PCP:  TODAY'S DATE: 2017  DICTATED BY:  Valeriano Kimball M.D.]    CHIEF COMPLAINT: [Discuss ICD, history of aortic valve surgery, left bundle branch block pattern on ECG.]    HPI:  [On  the PDA as well as a patent vein graft to a diagonal vessel. RISK FACTORS:  CAD - Hypertension    REVIEW OF SYSTEMS:  CONS: no fever, chills, or recent weight changes. EYES: denies significant visual changes.  ENMT: denies difficulties with hearing, ot with medical therapy for a bit longer. He is agreeable to follow up in 2-3 months, if left ventricular function remains decreased at that time, we will further discuss ICD therapy either CRT device therapy versus ICD with His-Bundle pacing.      PLAN:  [

## 2017-10-25 NOTE — PLAN OF CARE
CARDIOVASCULAR - ADULT    • Absence of cardiac arrhythmias or at baseline Progressing        DISCHARGE PLANNING    • Discharge to home or other facility with appropriate resources Progressing        RESPIRATORY - ADULT    • Achieves optimal ventilation and

## 2017-10-25 NOTE — H&P
[facility]ITAL[facility]  History and Physical     Dairl Marsh III Patient Status:  Emergency    3/21/1950 MRN [id number]   Location [address] [facility] Attending Obi Torrez MD   Hosp Day # 0 PCP Jasmin Cheung DO     Chief Complain aortic aneurysm repair  2/2011: OTHER SURGICAL HISTORY      Comment: reimplantation of bypass grafts  2/2011: VALVE REPLACEMENT      Comment: aortic- bioprosthetic    Social History:  reports that he has never smoked.  He has never used smokeless tobacco. H Products (TOTAL CARDIO HEALTH FORMULA) Oral Cap Take 1 Cap by mouth daily. Disp:  Rfl:        Review of Systems:   A comprehensive 14 point review of systems was completed. Pertinent positives and negatives noted in the HPI.     Physical Exam:    / for PE  2. Left bundle branch block with AV block during the lysis procedure  3. Patient is aware of about plans for pacemaker and he comes in to be reevaluated for fatigue  4. History of CAD status post CABG in 2007  5.  And history of bioprosthetic AVR wi

## 2017-10-25 NOTE — ED INITIAL ASSESSMENT (HPI)
Pt here for weakness and fatigue and sob since last night.  Pt states \" same symptoms as before when I had a PE.\". Pt denies n,v.

## 2017-10-25 NOTE — CONSULTS
Cardiology Consult Note     PRIMARY CARDIOLOGIST:ANITA/EZRA      CONSULT FOR: FATIGUE, SOB       HISTORY: 76 Y/O MALE WITH KNOWN WORSENING ICM WITH LAST EF 30%, CTA WITH SEVERE NATIVE CAD WITH PATENT GRAPHS, AVR WITH NORMAL FUNCTION, SADDLE PE IN MARCH WIT

## 2017-10-25 NOTE — CONSULTS
[facility]    PATIENT'S NAME: Nanette rOta   ATTENDING PHYSICIAN: Gisell Heredia D.O.   CONSULTING PHYSICIAN: Leodan Jacques M.D.    PATIENT ACCOUNT#:   [de-identified]    LOCATION:  [address] [place] [address]  MEDICAL RECORD #:   [id number]       DATE 2011.    MEDICATIONS:  At home include irbesartan/hydrochlorothiazide 150/12.5 mg p.o. b.i.d.; Eliquis 2.5 mg p.o. b.i.d.; metoprolol 25 mg p.o. daily. Not on statins. ALLERGIES:  Ambien, Darvocet, gabapentin. SOCIAL HISTORY:  No smoking.   Three caf

## 2017-10-25 NOTE — PROGRESS NOTES
NURSING ADMISSION NOTE      Patient admitted via Cart  Oriented to room. Safety precautions initiated. Bed in low position. Call light in reach.     A/Ox4  On RA  NSR on tele  No complaints of pain at this time  Accucheck QID  Pt up ad brinda, ambulatin

## 2017-10-25 NOTE — ED NOTES
Assumed care, pt resting in bed, no distress noted. He denies pain. Pt awaiting CT, he denies need at this time.  Call light in reach

## 2017-10-25 NOTE — ED PROVIDER NOTES
Patient Seen in: [facility] Emergency Department    History   Patient presents with:  Dyspnea KATYA SOB (respiratory)  Fatigue (constitutional, neurologic)    Stated Complaint: katya/fatigue r/o blood clot    HPI    Michael Douglas is a 43-year-old presenting t Disease Maternal Grandmother    • Heart Disease Sister    • Hypertension Brother    • Hypertension Sister    • Thyroid Disorder Paternal Aunt    • Diabetes Brother    • DVT/VTE Neg        Smoking status: Never Smoker Glucose 184 (*)     BUN 29 (*)     Creatinine 1.69 (*)     GFR 41 (*)     All other components within normal limits   TROPONIN I - Abnormal; Notable for the following:     Troponin 0.065 (*)     All other components within normal limits   POCT GLUCOSE - Ab

## 2017-10-25 NOTE — PAYOR COMM NOTE
--------------  ADMISSION REVIEW     Payor: [address] #:  [id number]  Authorization Number: N/A    Admit date: N/A  Admit time: N/A       Admitting Physician: Alley García MD  Attending Physician:  Rea Bridges Surgical History:  2007: CABG  01/03/2011: COLONOSCOPY  No date: OTHER SURGICAL HISTORY      Comment: aortic aneurysm repair  2/2011: OTHER SURGICAL HISTORY      Comment: reimplantation of bypass grafts  2/2011: VALVE REPLACEMENT      Comment: aortic- biop and rhythm  Abdomen soft nontender with no rebound tenderness noted  Extremity exam shows no clubbing cyanosis or edema  Skin exam shows no rashes or lacerations  Neuro exam shows no focal deficits  Back exam shows no tenderness     ED Course[SP.1]     Lab 0[OO.2] PCP[OO.1] Zoë Figueroa DO[OO.2]     Chief Complaint: sob fatigue    History of Present Illness:[OO.1] Lety Pappas III[OO.2] is a[OO.3 75year old[OO.2] male with hx of CAD s/p cabgx4, unprovoked saddle PE[OO.1] on Eliquis presents emergency ro Rfl:    Glucose Blood (FREESTYLE LITE) In Vitro Strip 1 Each by Other route daily.  Test 6 times daily Disp: 100 Each Rfl: 1   ONETOUCH LANCETS Test 6 times daily Disp:  Rfl:    Misc Natural Products (TOTAL CARDIO HEALTH FORMULA) Oral Cap Take 1 Cap by mout with history of saddle PE status post partial intrapulmonary lysis in March on Eliquis and compliant with medicines coming again with shortness of breath and fatigue-CT chest done in the emergency room negative for PE  2.  Left bundle branch block with AV b

## 2017-10-26 PROBLEM — I50.23 ACUTE ON CHRONIC SYSTOLIC CONGESTIVE HEART FAILURE (HCC): Status: ACTIVE | Noted: 2017-10-26

## 2017-10-26 PROCEDURE — 99232 SBSQ HOSP IP/OBS MODERATE 35: CPT | Performed by: HOSPITALIST

## 2017-10-26 NOTE — PAYOR COMM NOTE
--------------  CONTINUED STAY REVIEW    Payor: [employer]  Subscriber #:  [id number]  Authorization Number: N/A    Admit date: 10/25/17  Admit time: 0127    Admitting Physician: Venessa Arora MD  Attending Physician:  Otoniel William* EP evaluate for consideration for biventricular ICD.   May need repeat ischemic evaluation prior to this depending on troponin trend    · Dyspnea, fatigue   · CM, LVEF 30% per echo  · Chronic systolic HF, compensated on ARB and BB   · CAD with remote CABG 2

## 2017-10-26 NOTE — PROGRESS NOTES
[facility]  Cardiology Progress Note    Davidson Minks III Patient Status:  Observation    3/21/1950 MRN [id number]   [facility] 3NE-A Attending Jacques Amando [address] Day # 0 PCP Zoë Figueroa DO     Subjective:  Doing well echo  · Chronic systolic HF, compensated on ARB and BB   · CAD with remote CABG 2007- recent CTA with patent grafts 6/17- stable  · Saddle PE, s/p lytics March 2017, CTA with no PE  · Hx AVR 2011 - normal function per echo   · HLD  · HTN  · Chronic LBBB  ·

## 2017-10-27 ENCOUNTER — APPOINTMENT (OUTPATIENT)
Dept: INTERVENTIONAL RADIOLOGY/VASCULAR | Facility: HOSPITAL | Age: 67
DRG: 227 | End: 2017-10-27
Attending: INTERNAL MEDICINE
Payer: MEDICARE

## 2017-10-27 ENCOUNTER — APPOINTMENT (OUTPATIENT)
Dept: GENERAL RADIOLOGY | Facility: HOSPITAL | Age: 67
DRG: 227 | End: 2017-10-27
Attending: INTERNAL MEDICINE
Payer: MEDICARE

## 2017-10-27 PROBLEM — Z95.810 S/P ICD (INTERNAL CARDIAC DEFIBRILLATOR) PROCEDURE: Status: ACTIVE | Noted: 2017-10-27

## 2017-10-27 PROCEDURE — 71010 XR CHEST AP PORTABLE  (CPT=71010): CPT | Performed by: INTERNAL MEDICINE

## 2017-10-27 PROCEDURE — 02H43KZ INSERTION OF DEFIBRILLATOR LEAD INTO CORONARY VEIN, PERCUTANEOUS APPROACH: ICD-10-PCS | Performed by: INTERNAL MEDICINE

## 2017-10-27 PROCEDURE — 99232 SBSQ HOSP IP/OBS MODERATE 35: CPT | Performed by: HOSPITALIST

## 2017-10-27 PROCEDURE — 02HK3KZ INSERTION OF DEFIBRILLATOR LEAD INTO RIGHT VENTRICLE, PERCUTANEOUS APPROACH: ICD-10-PCS | Performed by: INTERNAL MEDICINE

## 2017-10-27 PROCEDURE — 0JH609Z INSERTION OF CARDIAC RESYNCHRONIZATION DEFIBRILLATOR PULSE GENERATOR INTO CHEST SUBCUTANEOUS TISSUE AND FASCIA, OPEN APPROACH: ICD-10-PCS | Performed by: INTERNAL MEDICINE

## 2017-10-27 PROCEDURE — 02H63KZ INSERTION OF DEFIBRILLATOR LEAD INTO RIGHT ATRIUM, PERCUTANEOUS APPROACH: ICD-10-PCS | Performed by: INTERNAL MEDICINE

## 2017-10-27 RX ORDER — ACETAMINOPHEN AND CODEINE PHOSPHATE 300; 30 MG/1; MG/1
2 TABLET ORAL EVERY 4 HOURS PRN
Status: DISCONTINUED | OUTPATIENT
Start: 2017-10-27 | End: 2017-10-28

## 2017-10-27 RX ORDER — BACITRACIN 50000 [USP'U]/1
INJECTION, POWDER, LYOPHILIZED, FOR SOLUTION INTRAMUSCULAR
Status: COMPLETED
Start: 2017-10-27 | End: 2017-10-27

## 2017-10-27 RX ORDER — ACETAMINOPHEN AND CODEINE PHOSPHATE 300; 30 MG/1; MG/1
1 TABLET ORAL EVERY 4 HOURS PRN
Status: DISCONTINUED | OUTPATIENT
Start: 2017-10-27 | End: 2017-10-28

## 2017-10-27 RX ORDER — SODIUM CHLORIDE 9 MG/ML
INJECTION, SOLUTION INTRAVENOUS CONTINUOUS
Status: DISCONTINUED | OUTPATIENT
Start: 2017-10-27 | End: 2017-10-28

## 2017-10-27 RX ORDER — DIPHENHYDRAMINE HYDROCHLORIDE 50 MG/ML
INJECTION INTRAMUSCULAR; INTRAVENOUS
Status: COMPLETED
Start: 2017-10-27 | End: 2017-10-27

## 2017-10-27 RX ORDER — ACETAMINOPHEN 325 MG/1
650 TABLET ORAL EVERY 4 HOURS PRN
Status: DISCONTINUED | OUTPATIENT
Start: 2017-10-27 | End: 2017-10-28

## 2017-10-27 RX ORDER — ONDANSETRON 2 MG/ML
4 INJECTION INTRAMUSCULAR; INTRAVENOUS EVERY 6 HOURS PRN
Status: DISCONTINUED | OUTPATIENT
Start: 2017-10-27 | End: 2017-10-28

## 2017-10-27 RX ORDER — HEPARIN SODIUM 5000 [USP'U]/ML
INJECTION, SOLUTION INTRAVENOUS; SUBCUTANEOUS
Status: COMPLETED
Start: 2017-10-27 | End: 2017-10-27

## 2017-10-27 RX ORDER — LIDOCAINE HYDROCHLORIDE 10 MG/ML
INJECTION, SOLUTION INFILTRATION; PERINEURAL
Status: COMPLETED
Start: 2017-10-27 | End: 2017-10-27

## 2017-10-27 RX ORDER — MIDAZOLAM HYDROCHLORIDE 1 MG/ML
INJECTION INTRAMUSCULAR; INTRAVENOUS
Status: COMPLETED
Start: 2017-10-27 | End: 2017-10-27

## 2017-10-27 RX ORDER — CHLORHEXIDINE GLUCONATE 4 G/100ML
30 SOLUTION TOPICAL
Status: COMPLETED | OUTPATIENT
Start: 2017-10-27 | End: 2017-10-27

## 2017-10-27 NOTE — PROGRESS NOTES
Received patient at 0730  A/O x 4  Down for ICD placement this morning  Patient returned from ICD placement drowsy but arouseable  NSR; A-V paced on tele  ICD surgical site intact with mepilex, site soft with no drainage  Post op VS completed, VSS  CXR con

## 2017-10-27 NOTE — PLAN OF CARE
CARDIOVASCULAR - ADULT     • Absence of cardiac arrhythmias or at baseline Progressing           DISCHARGE PLANNING     • Discharge to home or other facility with appropriate resources Progressing           RESPIRATORY - ADULT     • Achieves optimal ventil

## 2017-10-27 NOTE — PLAN OF CARE
Assumed care at [address]. Alert and oriented x4. Telemetry monitor reading SR.  NPO since midnight. Assessment remains unchanged from the beginning of shift. Call light in reach at the bedside. Will continue to monitor.     CARDIOVASCULAR - ADULT    • Absence o

## 2017-10-27 NOTE — PROCEDURES
OPERATION(S) PERFORMED:   1. BiV ICD implant. 2. Chest fluoroscopy. 3. CS venography.         : Megan Bravo MD  INDICATION: CM, LBBB with  ms, CHF, history of cardiac arrest    COMPLICATIONS: None     ESTIMATED BLOOD LOSS: Minimal.       M transported to telemetry in stable condition. There were no apparent intraoperative complications. ICD testing was performed.       CONCLUSIONS:   1. Status post successful implant of a BiV ICD with adequate pacing and sensing function of the RA, RV and LV

## 2017-10-28 VITALS
RESPIRATION RATE: 16 BRPM | OXYGEN SATURATION: 100 % | WEIGHT: 187.88 LBS | BODY MASS INDEX: 29.49 KG/M2 | TEMPERATURE: 99 F | SYSTOLIC BLOOD PRESSURE: 125 MMHG | DIASTOLIC BLOOD PRESSURE: 70 MMHG | HEIGHT: 67 IN | HEART RATE: 72 BPM

## 2017-10-28 PROCEDURE — 99239 HOSP IP/OBS DSCHRG MGMT >30: CPT | Performed by: HOSPITALIST

## 2017-10-28 RX ORDER — ACETAMINOPHEN AND CODEINE PHOSPHATE 300; 30 MG/1; MG/1
1 TABLET ORAL EVERY 4 HOURS PRN
Qty: 10 TABLET | Refills: 0 | Status: SHIPPED | OUTPATIENT
Start: 2017-10-28 | End: 2018-01-16 | Stop reason: ALTCHOICE

## 2017-10-28 NOTE — PLAN OF CARE
Pt alert and oriented. C/o pain to AICD site. PRN tylenol #3 given, relief noted. Denies SOB, dizziness, weakness. VSS. Afebrile. Sling in place to L arm. Mepilex over AICD site. Dressing clean, dry, intact.   Pt ambulating in hallway, tolerating well

## 2017-10-28 NOTE — PLAN OF CARE
NURSING DISCHARGE NOTE    Discharged home. Belongings sent home with pt. Discharge instructions, f/u appointments discussed with pt. Specific AICD instructions reinforced to pt. Script for tylenol #3 provided to pt. Questions answered.  Verbalized u

## 2017-10-28 NOTE — PLAN OF CARE
Assumed care at 1900. No acute issues overnight. Taking prn Tylenol #3 for pain. Left chest dressing intact. Sling in place. RA, denies sob. AV paced on tele. Up ad brinda in room. Vitals stable.      CARDIOVASCULAR - ADULT    • Absence of cardiac

## 2017-10-30 NOTE — DISCHARGE SUMMARY
[facility] H[facility]PITAL[facility]  DISCHARGE SUMMARY     Leo Daniels III Patient Status:  Inpatient    3/21/1950 MRN [id number]   [facility] 3NE-A Attending No att. providers found   Hosp Day # 3 PCP Tamika Bone DO     Date of Admission: 10/24/2017 Synopsis: Patient was admitted to cardiac telemetry for cardiac monitoring. Troponins were mildly elevated at 0.065, 0.062, and 0.055.   EP cardiology was consulted and with patient's echo showing an ejection fraction of 35-40% was recommended the patient Metoprolol Succinate ER 50 MG Tb24  Commonly known as: Toprol XL      Take 50 mg by mouth daily. Refills:  0     ONETOUCH LANCETS      Test 6 times daily   Refills:  0     TOTAL CARDIO HEALTH FORMULA Caps      Take 1 Cap by mouth daily.    Refills:  0

## 2017-10-31 ENCOUNTER — PATIENT OUTREACH (OUTPATIENT)
Dept: CASE MANAGEMENT | Age: 67
End: 2017-10-31

## 2017-10-31 DIAGNOSIS — Z02.9 ENCOUNTERS FOR ADMINISTRATIVE PURPOSE: ICD-10-CM

## 2017-10-31 NOTE — PROGRESS NOTES
Initial Post Discharge Follow Up   Discharge Date: 10/28/17  Contact Date: 10/31/2017    Consent Verification:  Assessment Completed With: Patient  HIPAA Verified?   Yes    Discharge Dx:  CHF, ICD placement    General:   • How have you been since your [facility] Rfl: 1   ONETOUCH LANCETS Test 6 times daily Disp:  Rfl:    Misc Natural Products (TOTAL CARDIO HEALTH FORMULA) Oral Cap Take 1 Cap by mouth daily.  Disp:  Rfl:      • When you were leaving the hospital were any medication changes discussed with you? yes  • [facility], [place], [facility]  [facility] [place], [address] [id number]          PCP TCM/HFU appointment: scheduled at D/C within 7-14 days  yes     NCM Reviewed/scheduled/rescheduled PCP TCM/H

## 2017-11-01 NOTE — CDS QUERY
Clarification – Significance of Elevated Troponin  CLINICAL DOCUMENTATION CLARIFICATION FORM  Dear Doctor:  Clinical information (provided below) suggests a diagnosis in a diagnostic report and/or results.  For accurate ICD-10-CM code assignment to reflect patient comes in now, he is on room air, stable vitals. Troponin was borderline at 0.065. As part of his workup in the past, he had a CTA of his coronaries which showed diffuse native coronary disease but patent grafts.   10/27 OR: Pre Procedure Diagnoses

## 2017-11-06 ENCOUNTER — PRIOR ORIGINAL RECORDS (OUTPATIENT)
Dept: OTHER | Age: 67
End: 2017-11-06

## 2017-11-06 ENCOUNTER — OFFICE VISIT (OUTPATIENT)
Dept: FAMILY MEDICINE CLINIC | Facility: CLINIC | Age: 67
End: 2017-11-06

## 2017-11-06 VITALS
TEMPERATURE: 98 F | DIASTOLIC BLOOD PRESSURE: 70 MMHG | HEART RATE: 50 BPM | RESPIRATION RATE: 16 BRPM | BODY MASS INDEX: 29.06 KG/M2 | WEIGHT: 183 LBS | OXYGEN SATURATION: 96 % | HEIGHT: 66.5 IN | SYSTOLIC BLOOD PRESSURE: 110 MMHG

## 2017-11-06 DIAGNOSIS — Z95.2 S/P AVR: ICD-10-CM

## 2017-11-06 DIAGNOSIS — Z86.711 HISTORY OF PULMONARY EMBOLISM: ICD-10-CM

## 2017-11-06 DIAGNOSIS — Z79.01 CHRONIC ANTICOAGULATION: ICD-10-CM

## 2017-11-06 DIAGNOSIS — I50.22 CHRONIC SYSTOLIC CONGESTIVE HEART FAILURE (HCC): ICD-10-CM

## 2017-11-06 DIAGNOSIS — R07.89 CHEST WALL PAIN FOLLOWING SURGERY: ICD-10-CM

## 2017-11-06 DIAGNOSIS — Z95.810 S/P ICD (INTERNAL CARDIAC DEFIBRILLATOR) PROCEDURE: ICD-10-CM

## 2017-11-06 DIAGNOSIS — Z09 HOSPITAL DISCHARGE FOLLOW-UP: Primary | ICD-10-CM

## 2017-11-06 DIAGNOSIS — R77.8 ELEVATED TROPONIN: ICD-10-CM

## 2017-11-06 DIAGNOSIS — G89.18 CHEST WALL PAIN FOLLOWING SURGERY: ICD-10-CM

## 2017-11-06 PROBLEM — J44.9 ASTHMA WITH COPD (CHRONIC OBSTRUCTIVE PULMONARY DISEASE) (HCC): Chronic | Status: RESOLVED | Noted: 2017-11-06 | Resolved: 2017-11-06

## 2017-11-06 PROBLEM — J44.89 ASTHMA WITH COPD (CHRONIC OBSTRUCTIVE PULMONARY DISEASE): Chronic | Status: RESOLVED | Noted: 2017-11-06 | Resolved: 2017-11-06

## 2017-11-06 PROBLEM — N18.30 CKD (CHRONIC KIDNEY DISEASE) STAGE 3, GFR 30-59 ML/MIN (HCC): Status: ACTIVE | Noted: 2017-11-06

## 2017-11-06 PROBLEM — J44.89 ASTHMA WITH COPD (CHRONIC OBSTRUCTIVE PULMONARY DISEASE): Chronic | Status: ACTIVE | Noted: 2017-11-06

## 2017-11-06 PROBLEM — J44.9 ASTHMA WITH COPD (CHRONIC OBSTRUCTIVE PULMONARY DISEASE) (HCC): Chronic | Status: ACTIVE | Noted: 2017-11-06

## 2017-11-06 PROCEDURE — 99495 TRANSJ CARE MGMT MOD F2F 14D: CPT | Performed by: FAMILY MEDICINE

## 2017-11-06 NOTE — PROGRESS NOTES
HPI:    Javed Varghese is a 75year old male here today for hospital follow up.    He was discharged from Inpatient hospital, [facility] to Home   Admission Date: 10/24/17   Discharge Date: 10/28/17  Hospital Discharge Diagnosis: Chronic systoloi 2-BIENVENIDO) 0.3 MG/0.3ML Injection Device Inject  as directed. Glucose Blood (FREESTYLE LITE) In Vitro Strip 1 Each by Other route daily.  Test 6 times daily   ONETOUCH LANCETS Test 6 times daily   Misc Natural Products (TOTAL CARDIO HEALTH FORMULA) Oral Cap T lesion tender over the incision of the defib in his left chest  EYES: denies blurred vision or double vision  HEENT: denies nasal congestion, sinus pain or ST  LUNGS: denies shortness of breath with exertion  CARDIOVASCULAR: denies chest pain on exertion o systolic congestive heart failure (HCC) -- stable and now has ICD    Elevated troponin  -- improved and had ICD placed during hospitalization    History of pulmonary embolism  -- stable    Chronic anticoagulation  -- stable    S/P AVR  -- stable    Chest w

## 2017-12-06 ENCOUNTER — OFFICE VISIT (OUTPATIENT)
Dept: FAMILY MEDICINE CLINIC | Facility: CLINIC | Age: 67
End: 2017-12-06

## 2017-12-06 VITALS
HEART RATE: 72 BPM | BODY MASS INDEX: 29.86 KG/M2 | HEIGHT: 66.5 IN | RESPIRATION RATE: 16 BRPM | TEMPERATURE: 99 F | DIASTOLIC BLOOD PRESSURE: 80 MMHG | WEIGHT: 188 LBS | SYSTOLIC BLOOD PRESSURE: 136 MMHG

## 2017-12-06 DIAGNOSIS — S16.1XXA STRAIN OF NECK MUSCLE, INITIAL ENCOUNTER: Primary | ICD-10-CM

## 2017-12-06 DIAGNOSIS — L90.5 SCAR: ICD-10-CM

## 2017-12-06 PROCEDURE — 99213 OFFICE O/P EST LOW 20 MIN: CPT | Performed by: FAMILY MEDICINE

## 2017-12-06 RX ORDER — NABUMETONE 500 MG/1
250 TABLET, FILM COATED ORAL 2 TIMES DAILY PRN
Qty: 30 TABLET | Refills: 0 | Status: SHIPPED | OUTPATIENT
Start: 2017-12-06 | End: 2018-07-13

## 2017-12-06 RX ORDER — CYCLOBENZAPRINE HCL 10 MG
10 TABLET ORAL 3 TIMES DAILY
Qty: 30 TABLET | Refills: 0 | Status: SHIPPED | OUTPATIENT
Start: 2017-12-06 | End: 2017-12-26

## 2017-12-06 NOTE — PROGRESS NOTES
Makenzie Hicks is a 75year old male. HPI:   Patient is here to follow-up on the excision of his pacemaker. Patient denies any complaints. He did take off the Steri-Strips himself because he did not come off on his own.   Patient states he was moving cream/milk  Darvocet [Propoxyph*    Rash  Insects                     Comment:cockroaches  Neurontin [Gabapent*    Rash   Past Medical History:   Diagnosis Date   • Atherosclerosis of coronary artery    • Chronic anticoagulation 6/28/2017   • Diabetes ([facility] 75. Result Value Ref Range   Troponin 0.062 (HH) <0.046 ng/mL   -TROPONIN I   Result Value Ref Range   Troponin 0.055 (HH) <0.046 ng/mL   -BASIC METABOLIC PANEL (8)   Result Value Ref Range   Glucose 137 (H) 70 - 99 mg/dL   BUN 24 (H) 8 - 20 mg/dL   Creatini 37.0 g/dL   RDW 13.0 11.5 - 16.0 %   RDW-SD 42.5 35.1 - 46.3 fL   Neutrophil Absolute Prelim 3.56 1.30 - 6.70 x10 (3) uL   Neutrophil Absolute 3.56 1.30 - 6.70 x10(3) uL   Lymphocyte Absolute 1.64 0.90 - 4.00 x10(3) uL   Monocyte Absolute 0.52 0.10 - 0.60 anti-inflammatory and coronary artery disease risk with anti-inflammatories. Patient understands the risks. Advised Tylenol for discomfort in his neck but if it is really bad he can try the anti-inflammatory.   I did advise a half a tablet twice a day as

## 2018-01-16 ENCOUNTER — TELEPHONE (OUTPATIENT)
Dept: FAMILY MEDICINE CLINIC | Facility: CLINIC | Age: 68
End: 2018-01-16

## 2018-01-16 ENCOUNTER — OFFICE VISIT (OUTPATIENT)
Dept: FAMILY MEDICINE CLINIC | Facility: CLINIC | Age: 68
End: 2018-01-16

## 2018-01-16 VITALS
HEART RATE: 80 BPM | SYSTOLIC BLOOD PRESSURE: 118 MMHG | WEIGHT: 186 LBS | BODY MASS INDEX: 29.54 KG/M2 | RESPIRATION RATE: 16 BRPM | TEMPERATURE: 98 F | DIASTOLIC BLOOD PRESSURE: 70 MMHG | HEIGHT: 66.5 IN

## 2018-01-16 DIAGNOSIS — L03.031 CELLULITIS OF FOURTH TOE OF RIGHT FOOT: Primary | ICD-10-CM

## 2018-01-16 PROCEDURE — 99213 OFFICE O/P EST LOW 20 MIN: CPT | Performed by: NURSE PRACTITIONER

## 2018-01-16 RX ORDER — AMOXICILLIN AND CLAVULANATE POTASSIUM 875; 125 MG/1; MG/1
1 TABLET, FILM COATED ORAL 2 TIMES DAILY
Qty: 20 TABLET | Refills: 0 | Status: SHIPPED | OUTPATIENT
Start: 2018-01-16 | End: 2018-01-26

## 2018-01-16 NOTE — TELEPHONE ENCOUNTER
Pt complains of a severely swollen fourth toe on his right foot which started last night. Describes a huge blister formation on it - like a callus. Toe is all red and on the bottom of it is a large circular pattern that is also spreading to the third toe.

## 2018-01-16 NOTE — PROGRESS NOTES
Javed Varghese is a 75year old male. HPI:   Patient presents today reporting pain to his right 3rd and 4th toes. He reports that he noticed the discomfort yesterday and also a blister on the 4th toe.  He reports that when he woke up this morning he no mg total) by mouth 2 (two) times daily as needed for Pain.  Disp: 30 tablet Rfl: 0      Past Medical History:   Diagnosis Date   • Atherosclerosis of coronary artery    • Chronic anticoagulation 6/28/2017   • Diabetes [facility])    • Essential hypertension    • Musculoskeletal: No gross deficit  Neurological: nerves II through XII grossly intact no sensorimotor deficit  Psychological: Mood and affect are normal.  Good communication skills.   ASSESSMENT AND PLAN:   Cellulitis of fourth toe of right foot  (primary

## 2018-01-16 NOTE — TELEPHONE ENCOUNTER
Pt given appt with camilo for this. Pt lives down the street. I had offered him 130 but he is asking for sooner. I have given noon spot that was available.

## 2018-01-23 ENCOUNTER — OFFICE VISIT (OUTPATIENT)
Dept: FAMILY MEDICINE CLINIC | Facility: CLINIC | Age: 68
End: 2018-01-23

## 2018-01-23 ENCOUNTER — LAB ENCOUNTER (OUTPATIENT)
Dept: LAB | Age: 68
End: 2018-01-23
Attending: FAMILY MEDICINE
Payer: MEDICARE

## 2018-01-23 VITALS
HEART RATE: 78 BPM | TEMPERATURE: 98 F | DIASTOLIC BLOOD PRESSURE: 75 MMHG | BODY MASS INDEX: 28.75 KG/M2 | HEIGHT: 66.5 IN | RESPIRATION RATE: 14 BRPM | WEIGHT: 181 LBS | SYSTOLIC BLOOD PRESSURE: 110 MMHG | OXYGEN SATURATION: 96 %

## 2018-01-23 DIAGNOSIS — N18.30 CKD (CHRONIC KIDNEY DISEASE) STAGE 3, GFR 30-59 ML/MIN (HCC): ICD-10-CM

## 2018-01-23 DIAGNOSIS — Z11.59 NEED FOR HEPATITIS C SCREENING TEST: ICD-10-CM

## 2018-01-23 DIAGNOSIS — Z95.810 S/P ICD (INTERNAL CARDIAC DEFIBRILLATOR) PROCEDURE: ICD-10-CM

## 2018-01-23 DIAGNOSIS — N40.0 BENIGN PROSTATIC HYPERPLASIA WITHOUT LOWER URINARY TRACT SYMPTOMS: ICD-10-CM

## 2018-01-23 DIAGNOSIS — I26.92 ACUTE SADDLE PULMONARY EMBOLISM WITHOUT ACUTE COR PULMONALE (HCC): ICD-10-CM

## 2018-01-23 DIAGNOSIS — E11.9 TYPE 2 DIABETES MELLITUS WITHOUT COMPLICATION, WITHOUT LONG-TERM CURRENT USE OF INSULIN (HCC): Primary | ICD-10-CM

## 2018-01-23 DIAGNOSIS — E55.9 VITAMIN D DEFICIENCY: ICD-10-CM

## 2018-01-23 DIAGNOSIS — E11.22 TYPE 2 DIABETES MELLITUS WITH STAGE 3 CHRONIC KIDNEY DISEASE, WITHOUT LONG-TERM CURRENT USE OF INSULIN (HCC): ICD-10-CM

## 2018-01-23 DIAGNOSIS — I48.0 PAF (PAROXYSMAL ATRIAL FIBRILLATION) (HCC): ICD-10-CM

## 2018-01-23 DIAGNOSIS — E11.65 UNCONTROLLED TYPE 2 DIABETES MELLITUS WITH OTHER CIRCULATORY COMPLICATION, WITHOUT LONG-TERM CURRENT USE OF INSULIN: ICD-10-CM

## 2018-01-23 DIAGNOSIS — E11.9 TYPE 2 DIABETES MELLITUS WITHOUT COMPLICATION, WITHOUT LONG-TERM CURRENT USE OF INSULIN (HCC): ICD-10-CM

## 2018-01-23 DIAGNOSIS — E78.5 DYSLIPIDEMIA: ICD-10-CM

## 2018-01-23 DIAGNOSIS — I10 ESSENTIAL HYPERTENSION: ICD-10-CM

## 2018-01-23 DIAGNOSIS — N18.30 TYPE 2 DIABETES MELLITUS WITH STAGE 3 CHRONIC KIDNEY DISEASE, WITHOUT LONG-TERM CURRENT USE OF INSULIN (HCC): ICD-10-CM

## 2018-01-23 DIAGNOSIS — E66.3 OVERWEIGHT (BMI 25.0-29.9): ICD-10-CM

## 2018-01-23 DIAGNOSIS — E11.41 DIABETIC MONONEUROPATHY ASSOCIATED WITH TYPE 2 DIABETES MELLITUS (HCC): ICD-10-CM

## 2018-01-23 DIAGNOSIS — R79.9 ABNORMAL BLOOD FINDINGS: ICD-10-CM

## 2018-01-23 DIAGNOSIS — Z79.01 CHRONIC ANTICOAGULATION: ICD-10-CM

## 2018-01-23 DIAGNOSIS — E11.59 UNCONTROLLED TYPE 2 DIABETES MELLITUS WITH OTHER CIRCULATORY COMPLICATION, WITHOUT LONG-TERM CURRENT USE OF INSULIN: ICD-10-CM

## 2018-01-23 DIAGNOSIS — Z86.711 HISTORY OF PULMONARY EMBOLISM: ICD-10-CM

## 2018-01-23 DIAGNOSIS — Z98.890 HISTORY OF THORACIC AORTIC ANEURYSM REPAIR: ICD-10-CM

## 2018-01-23 DIAGNOSIS — Z86.79 HISTORY OF THORACIC AORTIC ANEURYSM REPAIR: ICD-10-CM

## 2018-01-23 DIAGNOSIS — R80.9 PROTEINURIA, UNSPECIFIED TYPE: ICD-10-CM

## 2018-01-23 DIAGNOSIS — I25.810 CORONARY ARTERY DISEASE INVOLVING CORONARY BYPASS GRAFT OF NATIVE HEART WITHOUT ANGINA PECTORIS: ICD-10-CM

## 2018-01-23 DIAGNOSIS — Z95.2 S/P AVR: ICD-10-CM

## 2018-01-23 DIAGNOSIS — J45.20 MILD INTERMITTENT EXTRINSIC ASTHMA WITHOUT COMPLICATION: ICD-10-CM

## 2018-01-23 LAB
ALBUMIN SERPL-MCNC: 4 G/DL (ref 3.5–4.8)
ALP LIVER SERPL-CCNC: 82 U/L (ref 45–117)
ALT SERPL-CCNC: 25 U/L (ref 17–63)
AST SERPL-CCNC: 15 U/L (ref 15–41)
BASOPHILS # BLD AUTO: 0.03 X10(3) UL (ref 0–0.1)
BASOPHILS NFR BLD AUTO: 0.5 %
BILIRUB SERPL-MCNC: 0.8 MG/DL (ref 0.1–2)
BUN BLD-MCNC: 25 MG/DL (ref 8–20)
CALCIUM BLD-MCNC: 9.1 MG/DL (ref 8.3–10.3)
CHLORIDE: 103 MMOL/L (ref 101–111)
CHOLEST SMN-MCNC: 162 MG/DL (ref ?–200)
CO2: 27 MMOL/L (ref 22–32)
CREAT BLD-MCNC: 1.69 MG/DL (ref 0.7–1.3)
CREAT UR-SCNC: 207 MG/DL
EOSINOPHIL # BLD AUTO: 0.23 X10(3) UL (ref 0–0.3)
EOSINOPHIL NFR BLD AUTO: 3.7 %
ERYTHROCYTE [DISTWIDTH] IN BLOOD BY AUTOMATED COUNT: 13.2 % (ref 11.5–16)
EST. AVERAGE GLUCOSE BLD GHB EST-MCNC: 169 MG/DL (ref 68–126)
GLUCOSE BLD-MCNC: 142 MG/DL (ref 70–99)
HBA1C MFR BLD HPLC: 7.5 % (ref ?–5.7)
HCT VFR BLD AUTO: 44.2 % (ref 37–53)
HDLC SERPL-MCNC: 38 MG/DL (ref 45–?)
HDLC SERPL: 4.26 {RATIO} (ref ?–4.97)
HGB BLD-MCNC: 14.8 G/DL (ref 13–17)
IMMATURE GRANULOCYTE COUNT: 0.01 X10(3) UL (ref 0–1)
IMMATURE GRANULOCYTE RATIO %: 0.2 %
LDLC SERPL CALC-MCNC: 88 MG/DL (ref ?–130)
LYMPHOCYTES # BLD AUTO: 1.17 X10(3) UL (ref 0.9–4)
LYMPHOCYTES NFR BLD AUTO: 18.7 %
M PROTEIN MFR SERPL ELPH: 7.6 G/DL (ref 6.1–8.3)
MCH RBC QN AUTO: 31.8 PG (ref 27–33.2)
MCHC RBC AUTO-ENTMCNC: 33.5 G/DL (ref 31–37)
MCV RBC AUTO: 94.8 FL (ref 80–99)
MICROALBUMIN UR-MCNC: 4.69 MG/DL
MICROALBUMIN/CREAT 24H UR-RTO: 22.7 UG/MG (ref ?–30)
MONOCYTES # BLD AUTO: 0.5 X10(3) UL (ref 0.1–0.6)
MONOCYTES NFR BLD AUTO: 8 %
NEUTROPHIL ABS PRELIM: 4.33 X10 (3) UL (ref 1.3–6.7)
NEUTROPHILS # BLD AUTO: 4.33 X10(3) UL (ref 1.3–6.7)
NEUTROPHILS NFR BLD AUTO: 68.9 %
NONHDLC SERPL-MCNC: 124 MG/DL (ref ?–130)
PLATELET # BLD AUTO: 184 10(3)UL (ref 150–450)
POTASSIUM SERPL-SCNC: 3.9 MMOL/L (ref 3.6–5.1)
RBC # BLD AUTO: 4.66 X10(6)UL (ref 3.8–5.8)
RED CELL DISTRIBUTION WIDTH-SD: 45.8 FL (ref 35.1–46.3)
SODIUM SERPL-SCNC: 138 MMOL/L (ref 136–144)
TRIGL SERPL-MCNC: 182 MG/DL (ref ?–150)
VLDLC SERPL CALC-MCNC: 36 MG/DL (ref 5–40)
WBC # BLD AUTO: 6.3 X10(3) UL (ref 4–13)

## 2018-01-23 PROCEDURE — 36415 COLL VENOUS BLD VENIPUNCTURE: CPT | Performed by: FAMILY MEDICINE

## 2018-01-23 PROCEDURE — 82043 UR ALBUMIN QUANTITATIVE: CPT | Performed by: FAMILY MEDICINE

## 2018-01-23 PROCEDURE — G0472 HEP C SCREEN HIGH RISK/OTHER: HCPCS | Performed by: FAMILY MEDICINE

## 2018-01-23 PROCEDURE — 99214 OFFICE O/P EST MOD 30 MIN: CPT | Performed by: FAMILY MEDICINE

## 2018-01-23 PROCEDURE — 82570 ASSAY OF URINE CREATININE: CPT | Performed by: FAMILY MEDICINE

## 2018-01-23 PROCEDURE — 80061 LIPID PANEL: CPT | Performed by: FAMILY MEDICINE

## 2018-01-23 PROCEDURE — 83036 HEMOGLOBIN GLYCOSYLATED A1C: CPT | Performed by: FAMILY MEDICINE

## 2018-01-23 NOTE — PROGRESS NOTES
HPI:   Natali Ceballos is a 75year old male who presents for recheck of his diabetes. Patient’s FBS have been under 100. Last visit with ophthalmologist was 5 months ago. Pt.has been checking his feet on a regular basis.  Pt denies any tingling of the f 27.0 22.0 - 32.0 mmol/L   -TROPONIN I   Result Value Ref Range   Troponin 0.065 (HH) <0.046 ng/mL   -LIPID PANEL   Result Value Ref Range   Cholesterol, Total 166 <200 mg/dL   Triglycerides 276 (H) <150 mg/dL   HDL Cholesterol 35 (L) >45 mg/dL   LDL Choles Range   Hold Lt Green Auto Resulted    -RAINBOW DRAW GOLD   Result Value Ref Range   Hold Gold Auto Resulted    -CBC W/ DIFFERENTIAL   Result Value Ref Range   WBC 6.0 4.0 - 13.0 x10(3) uL   RBC 4.84 3.80 - 5.80 x10(6)uL   HGB 15.1 13.0 - 17.0 g/dL   HCT 4 daily Disp: 100 Each Rfl: 1   ONETOUCH LANCETS Test 6 times daily Disp:  Rfl:    Misc Natural Products (TOTAL CARDIO HEALTH FORMULA) Oral Cap Take 1 Cap by mouth daily.  Disp:  Rfl:         Ambien [Zolpidem]       Lisa  Bactrim                 Chris pain and denies nausea or vomitting  NEURO: denies headaches, dizziness, numbness or tingling    EXAM:   /75 (BP Location: Left arm, Patient Position: Sitting, Cuff Size: adult)   Pulse 78   Temp 97.8 °F (36.6 °C) (Oral)   Resp 14   Ht 66.5\"   Wt 18 prostatic hyperplasia without lower urinary tract symptoms  Coronary artery disease involving coronary bypass graft of native heart without angina pectoris  S/p avr  History of thoracic aortic aneurysm repair  Proteinuria, unspecified type  Type 2 diabetes

## 2018-01-24 ENCOUNTER — TELEPHONE (OUTPATIENT)
Dept: FAMILY MEDICINE CLINIC | Facility: CLINIC | Age: 68
End: 2018-01-24

## 2018-01-24 DIAGNOSIS — E78.5 DYSLIPIDEMIA: ICD-10-CM

## 2018-01-24 DIAGNOSIS — I10 ESSENTIAL HYPERTENSION: ICD-10-CM

## 2018-01-24 DIAGNOSIS — N18.30 TYPE 2 DIABETES MELLITUS WITH STAGE 3 CHRONIC KIDNEY DISEASE, WITHOUT LONG-TERM CURRENT USE OF INSULIN (HCC): Primary | ICD-10-CM

## 2018-01-24 DIAGNOSIS — E11.22 TYPE 2 DIABETES MELLITUS WITH STAGE 3 CHRONIC KIDNEY DISEASE, WITHOUT LONG-TERM CURRENT USE OF INSULIN (HCC): Primary | ICD-10-CM

## 2018-01-24 LAB — HEPATITIS C VIRUS AB INTERPRETATION: NONREACTIVE

## 2018-02-15 ENCOUNTER — TELEPHONE (OUTPATIENT)
Dept: FAMILY MEDICINE CLINIC | Facility: CLINIC | Age: 68
End: 2018-02-15

## 2018-02-15 NOTE — TELEPHONE ENCOUNTER
Received paperwork from [employer] stating that after a 30 day supply was filled for pt's Proair inhaler and Epipen that they would no longer be covered.   Called to [employer] spoke with Shaylee Green who stated that as long as the pt fills at local pharmacies

## 2018-02-21 ENCOUNTER — HOSPITAL ENCOUNTER (EMERGENCY)
Facility: HOSPITAL | Age: 68
Discharge: HOME | End: 2018-02-21
Payer: MEDICARE

## 2018-02-21 ENCOUNTER — APPOINTMENT (OUTPATIENT)
Dept: GENERAL RADIOLOGY | Facility: HOSPITAL | Age: 68
End: 2018-02-21
Payer: MEDICARE

## 2018-02-21 ENCOUNTER — OFFICE VISIT (OUTPATIENT)
Dept: HEMATOLOGY/ONCOLOGY | Facility: HOSPITAL | Age: 68
End: 2018-02-21
Attending: INTERNAL MEDICINE
Payer: MEDICARE

## 2018-02-21 ENCOUNTER — OFFICE VISIT (OUTPATIENT)
Dept: NEPHROLOGY | Facility: CLINIC | Age: 68
End: 2018-02-21

## 2018-02-21 ENCOUNTER — PRIOR ORIGINAL RECORDS (OUTPATIENT)
Dept: OTHER | Age: 68
End: 2018-02-21

## 2018-02-21 VITALS
RESPIRATION RATE: 18 BRPM | BODY MASS INDEX: 28.41 KG/M2 | TEMPERATURE: 98 F | SYSTOLIC BLOOD PRESSURE: 118 MMHG | HEIGHT: 67 IN | WEIGHT: 181 LBS | DIASTOLIC BLOOD PRESSURE: 78 MMHG | OXYGEN SATURATION: 97 % | HEART RATE: 69 BPM

## 2018-02-21 VITALS — WEIGHT: 181 LBS | SYSTOLIC BLOOD PRESSURE: 124 MMHG | DIASTOLIC BLOOD PRESSURE: 76 MMHG | BODY MASS INDEX: 28 KG/M2

## 2018-02-21 DIAGNOSIS — Z79.01 CHRONIC ANTICOAGULATION: ICD-10-CM

## 2018-02-21 DIAGNOSIS — I10 ESSENTIAL HYPERTENSION: ICD-10-CM

## 2018-02-21 DIAGNOSIS — N18.30 CKD (CHRONIC KIDNEY DISEASE) STAGE 3, GFR 30-59 ML/MIN (HCC): Primary | ICD-10-CM

## 2018-02-21 DIAGNOSIS — S86.012A STRAIN OF LEFT ACHILLES TENDON, INITIAL ENCOUNTER: Primary | ICD-10-CM

## 2018-02-21 DIAGNOSIS — Z86.711 HISTORY OF PULMONARY EMBOLISM: Primary | ICD-10-CM

## 2018-02-21 DIAGNOSIS — N18.30 CKD (CHRONIC KIDNEY DISEASE) STAGE 3, GFR 30-59 ML/MIN (HCC): ICD-10-CM

## 2018-02-21 PROCEDURE — 99283 EMERGENCY DEPT VISIT LOW MDM: CPT

## 2018-02-21 PROCEDURE — 99214 OFFICE O/P EST MOD 30 MIN: CPT | Performed by: INTERNAL MEDICINE

## 2018-02-21 PROCEDURE — 73610 X-RAY EXAM OF ANKLE: CPT

## 2018-02-21 NOTE — PROGRESS NOTES
Hematology Clinic Follow Up Visit    Patient Name: Javed Varghese  Medical Record Number: [id number]    YOB: 1950   PCP: Dr. Kristine Frey    Reason for Consultation:  Javed Varghese was seen today for the diagnosis of PE    Hematologi grafts  2/2011: VALVE REPLACEMENT      Comment: aortic- bioprosthetic    Home Medications:    apixaban (ELIQUIS) 2.5 MG Oral Tab Take 1 tablet (2.5 mg total) by mouth 2 (two) times daily.  Disp: 180 tablet Rfl: 1   MetFORMIN HCl 1000 MG Oral Tab Take 1 tabl Comment: 5 days a week      Social History Narrative    Lives with girlfriend.  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

## 2018-02-21 NOTE — ED PROVIDER NOTES
Patient Seen in: [facility] Emergency Department    History   Patient presents with:  Lower Extremity Injury (musculoskeletal)    Stated Complaint: left sided achilles tendon injury while curling    HPI    78-year-old male who comes to the emergency BMI 28.35 kg/m²         Physical Exam  Patient has tenderness over the left Achilles. There is no disruption. No bony tenderness. Normal sensation distally. Skin is intact.     ED Course   Labs Reviewed - No data to display    ED Course as of Feb 21 12

## 2018-02-21 NOTE — PATIENT INSTRUCTIONS
For Dr. Menditea Loser nurse line, call [phone] with any questions or concerns,  Monday through Friday 8:00 to 4:30. After hours or weekends for urgent needs: [phone].   Central [phone]  Medical Records:   [phone]  [facility]

## 2018-02-21 NOTE — PROGRESS NOTES
Patient here for MD f/u. Last seen in June of 2017. Has not f/u since. States still taking eliquis 2.5mg twice a day. Denies any new complaints. Denies abnormal bleeding/bruising. Denies SOB.

## 2018-02-21 NOTE — ED INITIAL ASSESSMENT (HPI)
L ankle pain. Pt sts that he always has pain and wears a splint. 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  Pt ambulatory with pain now

## 2018-02-22 ENCOUNTER — TELEPHONE (OUTPATIENT)
Dept: FAMILY MEDICINE CLINIC | Facility: CLINIC | Age: 68
End: 2018-02-22

## 2018-02-22 NOTE — PROGRESS NOTES
Nephrology Progress Note      ASSESSMENT/PLAN:        1) CKD 3- renal has been stable with serum creatinine 1.6-1.8 mg/dL over the last 10 years and may be due to hypertensive and age-related nephrosclerosis; also consider atheroembolic phenomenon after pr endocrine, nutritional, metabolic, and immunity disorders    • Screening for other and unspecified genitourinary condition    • Screening for thyroid disorder    • Special screening for malignant neoplasm of prostate       Past Surgical History:  2007: CAB TWICE DAILY Disp: 200 each Rfl: 11   EPINEPHrine (EPIPEN 2-BIENVENIDO) 0.3 MG/0.3ML Injection Device Inject  as directed. Disp:  Rfl:    Glucose Blood (FREESTYLE LITE) In Vitro Strip 1 Each by Other route daily.  Test 6 times daily Disp: 100 Each Rfl: 1   ONETOUCH

## 2018-05-07 ENCOUNTER — MYAURORA ACCOUNT LINK (OUTPATIENT)
Dept: OTHER | Age: 68
End: 2018-05-07

## 2018-05-08 ENCOUNTER — TELEPHONE (OUTPATIENT)
Dept: FAMILY MEDICINE CLINIC | Facility: CLINIC | Age: 68
End: 2018-05-08

## 2018-05-08 DIAGNOSIS — N18.30 TYPE 2 DIABETES MELLITUS WITH STAGE 3 CHRONIC KIDNEY DISEASE, WITHOUT LONG-TERM CURRENT USE OF INSULIN (HCC): ICD-10-CM

## 2018-05-08 DIAGNOSIS — E11.22 TYPE 2 DIABETES MELLITUS WITH STAGE 3 CHRONIC KIDNEY DISEASE, WITHOUT LONG-TERM CURRENT USE OF INSULIN (HCC): ICD-10-CM

## 2018-05-08 NOTE — TELEPHONE ENCOUNTER
Please call patient to schedule a medication follow up with Dr. Andreina Prescott for hypertension, diabetes, thyroid. Will be due in July for x 6 month med check. LOV 1/23/18 for med check    Thanks!

## 2018-05-08 NOTE — TELEPHONE ENCOUNTER
Cherelle RIVERO reports pt was alert during call noted below but speaking sl rapidly. Review of record shows metformin refill sent to [facility] today. Call back # provided above reaches voice mail stating \"google subscriber is unavailable\".  Left vmm advising

## 2018-05-08 NOTE — TELEPHONE ENCOUNTER
Pt called wanting meds ASAP said it was last fill at his old pharmacy however when I looke it was fill at his current pharmacy which is [facility], advised to of this told him all he has to do is contact them and they will send over an electronic request. Pt s

## 2018-06-14 ENCOUNTER — HOSPITAL (OUTPATIENT)
Dept: OTHER | Age: 68
End: 2018-06-14
Attending: INTERNAL MEDICINE

## 2018-06-14 ENCOUNTER — PRIOR ORIGINAL RECORDS (OUTPATIENT)
Dept: OTHER | Age: 68
End: 2018-06-14

## 2018-07-02 ENCOUNTER — TELEPHONE (OUTPATIENT)
Dept: FAMILY MEDICINE CLINIC | Facility: CLINIC | Age: 68
End: 2018-07-02

## 2018-07-02 RX ORDER — CIPROFLOXACIN 500 MG/1
500 TABLET, FILM COATED ORAL 2 TIMES DAILY
Qty: 6 TABLET | Refills: 0 | Status: SHIPPED | OUTPATIENT
Start: 2018-07-02 | End: 2018-08-07 | Stop reason: ALTCHOICE

## 2018-07-02 NOTE — TELEPHONE ENCOUNTER
Patient is going to [facility] for work for 10 days. Patient is leaving tomorrow. He is asking for a prescription in case he gets diarrhea from the water while down there. [facility] Pharm[facility]. Please advise at [phone]. Thank you.

## 2018-07-09 ENCOUNTER — TELEPHONE (OUTPATIENT)
Dept: FAMILY MEDICINE CLINIC | Facility: CLINIC | Age: 68
End: 2018-07-09

## 2018-07-09 NOTE — TELEPHONE ENCOUNTER
Jordan for pt to Cb on his cell number. The other number [phone] will not go through.  Please see note from Dr. Jose Martin Brian

## 2018-07-09 NOTE — TELEPHONE ENCOUNTER
Pt is in [facility] and will be having dental surgery. they want him to take amox 500 mg 4 before surgery and 4 after. He would like to know if we can [facility] [id number] [facility]. Please advise. Thank you.

## 2018-07-09 NOTE — TELEPHONE ENCOUNTER
We cannot prescribe to a pharmacy in [facility].  He will need to get it from the dentist in [facility].

## 2018-07-13 DIAGNOSIS — E11.22 TYPE 2 DIABETES MELLITUS WITH STAGE 3 CHRONIC KIDNEY DISEASE, WITHOUT LONG-TERM CURRENT USE OF INSULIN (HCC): ICD-10-CM

## 2018-07-13 DIAGNOSIS — N18.30 TYPE 2 DIABETES MELLITUS WITH STAGE 3 CHRONIC KIDNEY DISEASE, WITHOUT LONG-TERM CURRENT USE OF INSULIN (HCC): ICD-10-CM

## 2018-07-16 NOTE — TELEPHONE ENCOUNTER
From: Jess Rea III  Sent: 7/13/2018 8:35 PM CDT  Subject: Medication Renewal Request    Jess Rea III would like a refill of the following medications:     apixaban (ELIQUIS) 2.5 MG Oral Tab Dunia Sarabia MD]    Preferred pharmacy: [facility]

## 2018-07-19 RX ORDER — CIPROFLOXACIN 500 MG/1
500 TABLET, FILM COATED ORAL 2 TIMES DAILY
Qty: 6 TABLET | Refills: 0
Start: 2018-07-19

## 2018-07-19 NOTE — TELEPHONE ENCOUNTER
From: Nati Dietz III  Sent: 7/13/2018 8:35 PM CDT  Subject: Medication Renewal Request    Nati Dietz III would like a refill of the following medications:     Nabumetone 500 MG Oral Tab BOYD Crockett     METFORMIN HCL 1000 MG Oral Tab [Zoë eRgan

## 2018-07-19 NOTE — TELEPHONE ENCOUNTER
Nabumetone 500 MG Oral Tab  Not a per protocol medication. Rx is pending for your approval.    Please sign,     Thank you    MetFORMIN HCl 1000 MG Oral Tab  Protocol failed dur to the pt last A1C level.     The patient last Hemoglobin A1c was done on   0

## 2018-07-20 ENCOUNTER — TELEPHONE (OUTPATIENT)
Dept: FAMILY MEDICINE CLINIC | Facility: CLINIC | Age: 68
End: 2018-07-20

## 2018-07-20 RX ORDER — NABUMETONE 500 MG/1
250 TABLET, FILM COATED ORAL 2 TIMES DAILY PRN
Qty: 30 TABLET | Refills: 0 | Status: SHIPPED
Start: 2018-07-20 | End: 2019-07-20

## 2018-07-20 NOTE — TELEPHONE ENCOUNTER
Call to tuyet/pharmacist-advised of dr Estela Alves instructions noted below. [facility] already received this info this afternoon from gretchen in our ofc     Call to pt's cell reaches unidentified voice mail.  Left [facility] req call back to triage nurse 7/23/18 am.

## 2018-07-20 NOTE — TELEPHONE ENCOUNTER
Call from tuyet/pharmacist/[facility]/ailin received order for nabumetone from dr Antony Mahmood today. Calling to advise there is a major medication interaction between the nabumetone and pt's eliquis.    Advised will update dr Antony Mahmood w this info-will nichelle

## 2018-07-23 NOTE — TELEPHONE ENCOUNTER
Call to pt's cell reaches unidentified voice mail. Left vmm req call back to triage nurse today for new instructions. Provided ofc phone hours.

## 2018-07-24 NOTE — TELEPHONE ENCOUNTER
Call to pt's cell reaches voice mail. Left [facility] req call back to [facility] tomorrow for dr instructions. Call to tariq's cell/significant other-listed on hipaa consent-also reaches voice mail. Left [facility] req pt call back tomorrow for dr instructions.

## 2018-07-25 RX ORDER — ACETAMINOPHEN AND CODEINE PHOSPHATE 300; 30 MG/1; MG/1
1 TABLET ORAL EVERY 4 HOURS PRN
Qty: 30 TABLET | Refills: 0 | OUTPATIENT
Start: 2018-07-25

## 2018-07-25 NOTE — TELEPHONE ENCOUNTER
Pt called back regarding tylenol. You had recommended otc last week in lieu of the nabumetone (couldn't take d/t eliquis).   He is asking if you could refill Tylenol #3 instead, says he takes very seldom but is traveling again this weekend and wants to hav

## 2018-07-25 NOTE — TELEPHONE ENCOUNTER
Pt has not called back after multiple attempts. Per pharmacy they did not fill the nabumetone as directed previously. I have sent [employer] msg advising pt to take tylenol otc as below. Asked to cb if questions.

## 2018-07-26 NOTE — TELEPHONE ENCOUNTER
I just saw this encounter opened and was about to close it but I noticed you refused his request for the tylenol #3. (see notes below regarding this request)  He is going out of town tomorrow and he is probably going to question why.   Please advise what I

## 2018-08-17 ENCOUNTER — LAB ENCOUNTER (OUTPATIENT)
Dept: LAB | Facility: HOSPITAL | Age: 68
End: 2018-08-17
Attending: NURSE PRACTITIONER
Payer: MEDICARE

## 2018-08-17 ENCOUNTER — PRIOR ORIGINAL RECORDS (OUTPATIENT)
Dept: OTHER | Age: 68
End: 2018-08-17

## 2018-08-17 ENCOUNTER — LAB ENCOUNTER (OUTPATIENT)
Dept: LAB | Age: 68
End: 2018-08-17
Attending: NURSE PRACTITIONER
Payer: MEDICARE

## 2018-08-17 ENCOUNTER — HOSPITAL ENCOUNTER (OUTPATIENT)
Dept: CT IMAGING | Age: 68
Discharge: HOME | End: 2018-08-17
Attending: NURSE PRACTITIONER
Payer: MEDICARE

## 2018-08-17 ENCOUNTER — OFFICE VISIT (OUTPATIENT)
Dept: FAMILY MEDICINE CLINIC | Facility: CLINIC | Age: 68
End: 2018-08-17
Payer: MEDICARE

## 2018-08-17 VITALS
BODY MASS INDEX: 29.06 KG/M2 | WEIGHT: 183 LBS | HEIGHT: 66.5 IN | DIASTOLIC BLOOD PRESSURE: 68 MMHG | HEART RATE: 80 BPM | SYSTOLIC BLOOD PRESSURE: 110 MMHG | RESPIRATION RATE: 16 BRPM | TEMPERATURE: 98 F

## 2018-08-17 DIAGNOSIS — R10.32 LEFT LOWER QUADRANT PAIN: ICD-10-CM

## 2018-08-17 DIAGNOSIS — Z86.711 HISTORY OF PULMONARY EMBOLISM: ICD-10-CM

## 2018-08-17 DIAGNOSIS — R11.2 NON-INTRACTABLE VOMITING WITH NAUSEA, UNSPECIFIED VOMITING TYPE: ICD-10-CM

## 2018-08-17 DIAGNOSIS — E11.22 TYPE 2 DIABETES MELLITUS WITH STAGE 3 CHRONIC KIDNEY DISEASE, WITHOUT LONG-TERM CURRENT USE OF INSULIN (HCC): ICD-10-CM

## 2018-08-17 DIAGNOSIS — Z79.01 CHRONIC ANTICOAGULATION: ICD-10-CM

## 2018-08-17 DIAGNOSIS — I10 ESSENTIAL HYPERTENSION: ICD-10-CM

## 2018-08-17 DIAGNOSIS — E78.5 DYSLIPIDEMIA: ICD-10-CM

## 2018-08-17 DIAGNOSIS — R19.7 DIARRHEA, UNSPECIFIED TYPE: Primary | ICD-10-CM

## 2018-08-17 DIAGNOSIS — R19.7 DIARRHEA, UNSPECIFIED TYPE: ICD-10-CM

## 2018-08-17 DIAGNOSIS — N18.30 TYPE 2 DIABETES MELLITUS WITH STAGE 3 CHRONIC KIDNEY DISEASE, WITHOUT LONG-TERM CURRENT USE OF INSULIN (HCC): ICD-10-CM

## 2018-08-17 LAB
ALBUMIN SERPL-MCNC: 4.1 G/DL (ref 3.5–4.8)
ALBUMIN/GLOB SERPL: 1.2 {RATIO} (ref 1–2)
ALP LIVER SERPL-CCNC: 60 U/L (ref 45–117)
ALT SERPL-CCNC: 26 U/L (ref 17–63)
ANION GAP SERPL CALC-SCNC: 8 MMOL/L (ref 0–18)
AST SERPL-CCNC: 21 U/L (ref 15–41)
BASOPHILS # BLD AUTO: 0.03 X10(3) UL (ref 0–0.1)
BASOPHILS NFR BLD AUTO: 0.4 %
BILIRUB SERPL-MCNC: 0.3 MG/DL (ref 0.1–2)
BUN BLD-MCNC: 29 MG/DL (ref 8–20)
BUN/CREAT SERPL: 16.1 (ref 10–20)
CALCIUM BLD-MCNC: 9.4 MG/DL (ref 8.3–10.3)
CHLORIDE SERPL-SCNC: 105 MMOL/L (ref 101–111)
CHOLEST SMN-MCNC: 171 MG/DL (ref ?–200)
CO2 SERPL-SCNC: 25 MMOL/L (ref 22–32)
CREAT BLD-MCNC: 1.8 MG/DL (ref 0.7–1.3)
CREAT SERPL-MCNC: 1.8 MG/DL (ref 0.7–1.3)
CRYPTOSP AG STL QL IA: NEGATIVE
EOSINOPHIL # BLD AUTO: 0.17 X10(3) UL (ref 0–0.3)
EOSINOPHIL NFR BLD AUTO: 2.2 %
ERYTHROCYTE [DISTWIDTH] IN BLOOD BY AUTOMATED COUNT: 13 % (ref 11.5–16)
EST. AVERAGE GLUCOSE BLD GHB EST-MCNC: 166 MG/DL (ref 68–126)
G LAMBLIA AG STL QL IA: NEGATIVE
GLOBULIN PLAS-MCNC: 3.4 G/DL (ref 2.5–4)
GLUCOSE BLD-MCNC: 132 MG/DL (ref 70–99)
HBA1C MFR BLD HPLC: 7.4 % (ref ?–5.7)
HCT VFR BLD AUTO: 43.3 % (ref 37–53)
HDLC SERPL-MCNC: 35 MG/DL (ref 40–59)
HGB BLD-MCNC: 14.4 G/DL (ref 13–17)
IMMATURE GRANULOCYTE COUNT: 0.02 X10(3) UL (ref 0–1)
IMMATURE GRANULOCYTE RATIO %: 0.3 %
LDLC SERPL CALC-MCNC: 89 MG/DL (ref ?–100)
LYMPHOCYTES # BLD AUTO: 1.43 X10(3) UL (ref 0.9–4)
LYMPHOCYTES NFR BLD AUTO: 18.6 %
M PROTEIN MFR SERPL ELPH: 7.5 G/DL (ref 6.1–8.3)
MCH RBC QN AUTO: 31.4 PG (ref 27–33.2)
MCHC RBC AUTO-ENTMCNC: 33.3 G/DL (ref 31–37)
MCV RBC AUTO: 94.5 FL (ref 80–99)
MONOCYTES # BLD AUTO: 0.59 X10(3) UL (ref 0.1–1)
MONOCYTES NFR BLD AUTO: 7.7 %
NEUTROPHIL ABS PRELIM: 5.46 X10 (3) UL (ref 1.3–6.7)
NEUTROPHILS # BLD AUTO: 5.46 X10(3) UL (ref 1.3–6.7)
NEUTROPHILS NFR BLD AUTO: 70.8 %
NONHDLC SERPL-MCNC: 136 MG/DL (ref ?–130)
OSMOLALITY SERPL CALC.SUM OF ELEC: 294 MOSM/KG (ref 275–295)
PLATELET # BLD AUTO: 170 10(3)UL (ref 150–450)
POTASSIUM SERPL-SCNC: 4.3 MMOL/L (ref 3.6–5.1)
RBC # BLD AUTO: 4.58 X10(6)UL (ref 3.8–5.8)
RED CELL DISTRIBUTION WIDTH-SD: 44.4 FL (ref 35.1–46.3)
SED RATE-ML: 11 MM/HR (ref 0–12)
SODIUM SERPL-SCNC: 138 MMOL/L (ref 136–144)
TRIGL SERPL-MCNC: 236 MG/DL (ref 30–149)
VLDLC SERPL CALC-MCNC: 47 MG/DL (ref 0–30)
WBC # BLD AUTO: 7.7 X10(3) UL (ref 4–13)

## 2018-08-17 PROCEDURE — 87046 STOOL CULTR AEROBIC BACT EA: CPT

## 2018-08-17 PROCEDURE — 80053 COMPREHEN METABOLIC PANEL: CPT

## 2018-08-17 PROCEDURE — 87045 FECES CULTURE AEROBIC BACT: CPT

## 2018-08-17 PROCEDURE — 87209 SMEAR COMPLEX STAIN: CPT

## 2018-08-17 PROCEDURE — 36415 COLL VENOUS BLD VENIPUNCTURE: CPT

## 2018-08-17 PROCEDURE — 87329 GIARDIA AG IA: CPT

## 2018-08-17 PROCEDURE — 87177 OVA AND PARASITES SMEARS: CPT

## 2018-08-17 PROCEDURE — 85652 RBC SED RATE AUTOMATED: CPT

## 2018-08-17 PROCEDURE — 82272 OCCULT BLD FECES 1-3 TESTS: CPT

## 2018-08-17 PROCEDURE — 85025 COMPLETE CBC W/AUTO DIFF WBC: CPT

## 2018-08-17 PROCEDURE — 87427 SHIGA-LIKE TOXIN AG IA: CPT

## 2018-08-17 PROCEDURE — 83036 HEMOGLOBIN GLYCOSYLATED A1C: CPT

## 2018-08-17 PROCEDURE — 80061 LIPID PANEL: CPT

## 2018-08-17 PROCEDURE — 82565 ASSAY OF CREATININE: CPT

## 2018-08-17 PROCEDURE — 87272 CRYPTOSPORIDIUM AG IF: CPT

## 2018-08-17 PROCEDURE — 99214 OFFICE O/P EST MOD 30 MIN: CPT | Performed by: NURSE PRACTITIONER

## 2018-08-17 PROCEDURE — 74177 CT ABD & PELVIS W/CONTRAST: CPT | Performed by: NURSE PRACTITIONER

## 2018-08-17 RX ORDER — ONDANSETRON 4 MG/1
4 TABLET, FILM COATED ORAL EVERY 8 HOURS PRN
Qty: 15 TABLET | Refills: 0 | Status: SHIPPED | OUTPATIENT
Start: 2018-08-17 | End: 2018-09-22 | Stop reason: ALTCHOICE

## 2018-08-17 RX ORDER — APIXABAN 2.5 MG/1
TABLET, FILM COATED ORAL
Qty: 60 TABLET | Refills: 0 | Status: SHIPPED | OUTPATIENT
Start: 2018-08-17 | End: 2018-09-22

## 2018-08-17 RX ORDER — METRONIDAZOLE 500 MG/1
500 TABLET ORAL 3 TIMES DAILY
Qty: 30 TABLET | Refills: 0 | Status: SHIPPED | OUTPATIENT
Start: 2018-08-17 | End: 2018-09-22 | Stop reason: ALTCHOICE

## 2018-08-17 RX ORDER — LEVOFLOXACIN 500 MG/1
500 TABLET, FILM COATED ORAL DAILY
Qty: 10 TABLET | Refills: 0 | Status: SHIPPED | OUTPATIENT
Start: 2018-08-17 | End: 2018-09-24 | Stop reason: ALTCHOICE

## 2018-08-17 NOTE — PROGRESS NOTES
Van Kaufman is a 70year old male. HPI:   Patient presents today reporting nausea, vomiting (1 episode 4 days ago), diarrhea and abdominal pain for the past 5 days.  He reports that he has recently traveled to [facility] for work and will be returning t mouth 2 (two) times daily with meals. Disp: 180 tablet Rfl: 0   Metoprolol Succinate ER 50 MG Oral Tablet 24 Hr Take 50 mg by mouth daily.  Takes 25 mg by mouth daily  Disp:  Rfl:    Irbesartan-Hydrochlorothiazide 150-12.5 MG Oral Tab Take 1 tablet by mouth exertion  CARDIOVASCULAR: denies chest pain on exertion  GI: SEE HPI  : denies any urinary symptoms  Musculoskeletal: No motor deficits  EXAM:   /68 (BP Location: Left arm, Patient Position: Sitting, Cuff Size: adult)   Pulse 80   Temp 98.2 °F (36. STOOL CULTURE W/SHIGATOXIN; Future  - OVA AND PARASITE W GIARDIA EIA; Future    2. Left lower quadrant pain  STAT CT abdomen and pelvis ordered to rule out diverticulitis. Await CT result for further recommendations.   - CT ABDOMEN+PELVIS(CONTRAST ONLY)(CP

## 2018-08-20 ENCOUNTER — TELEPHONE (OUTPATIENT)
Dept: FAMILY MEDICINE CLINIC | Facility: CLINIC | Age: 68
End: 2018-08-20

## 2018-08-21 ENCOUNTER — TELEPHONE (OUTPATIENT)
Dept: NEPHROLOGY | Facility: CLINIC | Age: 68
End: 2018-08-21

## 2018-08-21 NOTE — TELEPHONE ENCOUNTER
Please call patient and get an update on his abdominal pain. Patient was diagnosed with diverticulitis on 8-17-18. Patient was started on a course of Levaquin and Flagyl at that time.   When I called the patient and discuss his CT abdomen and pelvis findi

## 2018-08-21 NOTE — TELEPHONE ENCOUNTER
Call from pt stating returning our call today. sts LLQ abd pain/tenderness is much better/almost gone, no longer has abd cramping, only sl nausea, no vomiting, no diarrhea. sts has been slowly advancing diet from initial clear liquids-tolerating well.  Juan

## 2018-08-21 NOTE — TELEPHONE ENCOUNTER
Call to [facility]-advised both abx arrived yesterday-ready for pt to . sts they would only notify pt if he provided text contact #. Call to pt-advised of above info-reinforced picking up/starting meds asap today. Offered to schedule follow up appt.  Pt

## 2018-08-22 ENCOUNTER — APPOINTMENT (OUTPATIENT)
Dept: HEMATOLOGY/ONCOLOGY | Facility: HOSPITAL | Age: 68
End: 2018-08-22
Attending: INTERNAL MEDICINE
Payer: MEDICARE

## 2018-08-22 LAB
OVA AND PARASITE, TRICHROME STAIN: NEGATIVE
OVA AND PARASITE, WET MOUNT: NEGATIVE

## 2018-08-30 ENCOUNTER — HOSPITAL ENCOUNTER (OUTPATIENT)
Dept: CV DIAGNOSTICS | Facility: HOSPITAL | Age: 68
Discharge: HOME | End: 2018-08-30
Attending: Other
Payer: MEDICARE

## 2018-08-30 DIAGNOSIS — Z95.3 BIOPROSTHETIC AORTIC VALVE REPLACEMENT DURING CURRENT HOSPITALIZATION: ICD-10-CM

## 2018-08-30 PROCEDURE — 93306 TTE W/DOPPLER COMPLETE: CPT | Performed by: OTHER

## 2018-09-21 PROBLEM — Z00.00 WELL ADULT EXAM: Status: ACTIVE | Noted: 2018-09-21

## 2018-09-21 RX ORDER — OMEPRAZOLE 20 MG/1
20 CAPSULE, DELAYED RELEASE ORAL DAILY
COMMUNITY
End: 2018-09-22 | Stop reason: ALTCHOICE

## 2018-09-21 RX ORDER — METOPROLOL SUCCINATE 25 MG/1
25 TABLET, EXTENDED RELEASE ORAL DAILY
Refills: 1 | COMMUNITY
Start: 2018-05-24 | End: 2018-09-22 | Stop reason: DRUGHIGH

## 2018-09-22 ENCOUNTER — OFFICE VISIT (OUTPATIENT)
Dept: FAMILY MEDICINE CLINIC | Facility: CLINIC | Age: 68
End: 2018-09-22

## 2018-09-22 VITALS
WEIGHT: 183 LBS | DIASTOLIC BLOOD PRESSURE: 80 MMHG | SYSTOLIC BLOOD PRESSURE: 134 MMHG | RESPIRATION RATE: 16 BRPM | OXYGEN SATURATION: 99 % | TEMPERATURE: 98 F | HEIGHT: 66.5 IN | HEART RATE: 81 BPM | BODY MASS INDEX: 29.06 KG/M2

## 2018-09-22 DIAGNOSIS — Z95.2 S/P AVR: ICD-10-CM

## 2018-09-22 DIAGNOSIS — E78.5 DYSLIPIDEMIA: ICD-10-CM

## 2018-09-22 DIAGNOSIS — Z00.00 WELL ADULT EXAM: Primary | ICD-10-CM

## 2018-09-22 DIAGNOSIS — E66.3 OVERWEIGHT (BMI 25.0-29.9): ICD-10-CM

## 2018-09-22 DIAGNOSIS — G89.29 CHRONIC MIDLINE LOW BACK PAIN WITHOUT SCIATICA: ICD-10-CM

## 2018-09-22 DIAGNOSIS — Z86.79 HISTORY OF THORACIC AORTIC ANEURYSM REPAIR: ICD-10-CM

## 2018-09-22 DIAGNOSIS — H25.019 CORTICAL SENILE CATARACT, UNSPECIFIED LATERALITY: ICD-10-CM

## 2018-09-22 DIAGNOSIS — I10 ESSENTIAL HYPERTENSION: ICD-10-CM

## 2018-09-22 DIAGNOSIS — N40.0 BENIGN PROSTATIC HYPERPLASIA WITHOUT LOWER URINARY TRACT SYMPTOMS: ICD-10-CM

## 2018-09-22 DIAGNOSIS — L82.1 OTHER SEBORRHEIC KERATOSIS: ICD-10-CM

## 2018-09-22 DIAGNOSIS — H04.129 DRY EYE SYNDROME, UNSPECIFIED LATERALITY: ICD-10-CM

## 2018-09-22 DIAGNOSIS — Z95.810 S/P ICD (INTERNAL CARDIAC DEFIBRILLATOR) PROCEDURE: ICD-10-CM

## 2018-09-22 DIAGNOSIS — E11.59 UNCONTROLLED TYPE 2 DIABETES MELLITUS WITH OTHER CIRCULATORY COMPLICATION, WITHOUT LONG-TERM CURRENT USE OF INSULIN: ICD-10-CM

## 2018-09-22 DIAGNOSIS — Z13.31 DEPRESSION SCREENING: ICD-10-CM

## 2018-09-22 DIAGNOSIS — E04.1 THYROID NODULE: ICD-10-CM

## 2018-09-22 DIAGNOSIS — I25.810 CORONARY ARTERY DISEASE INVOLVING CORONARY BYPASS GRAFT OF NATIVE HEART WITHOUT ANGINA PECTORIS: ICD-10-CM

## 2018-09-22 DIAGNOSIS — Z23 NEED FOR VACCINATION: ICD-10-CM

## 2018-09-22 DIAGNOSIS — E11.65 UNCONTROLLED TYPE 2 DIABETES MELLITUS WITH OTHER CIRCULATORY COMPLICATION, WITHOUT LONG-TERM CURRENT USE OF INSULIN: ICD-10-CM

## 2018-09-22 DIAGNOSIS — Z86.711 HISTORY OF PULMONARY EMBOLISM: ICD-10-CM

## 2018-09-22 DIAGNOSIS — K57.30 DIVERTICULOSIS OF LARGE INTESTINE WITHOUT HEMORRHAGE: ICD-10-CM

## 2018-09-22 DIAGNOSIS — E11.22 TYPE 2 DIABETES MELLITUS WITH STAGE 3 CHRONIC KIDNEY DISEASE, WITHOUT LONG-TERM CURRENT USE OF INSULIN (HCC): ICD-10-CM

## 2018-09-22 DIAGNOSIS — E11.41 DIABETIC MONONEUROPATHY ASSOCIATED WITH TYPE 2 DIABETES MELLITUS (HCC): ICD-10-CM

## 2018-09-22 DIAGNOSIS — N18.30 CKD (CHRONIC KIDNEY DISEASE) STAGE 3, GFR 30-59 ML/MIN (HCC): ICD-10-CM

## 2018-09-22 DIAGNOSIS — J45.20 MILD INTERMITTENT EXTRINSIC ASTHMA WITHOUT COMPLICATION: ICD-10-CM

## 2018-09-22 DIAGNOSIS — Z00.00 ENCOUNTER FOR ANNUAL HEALTH EXAMINATION: ICD-10-CM

## 2018-09-22 DIAGNOSIS — N18.30 TYPE 2 DIABETES MELLITUS WITH STAGE 3 CHRONIC KIDNEY DISEASE, WITHOUT LONG-TERM CURRENT USE OF INSULIN (HCC): ICD-10-CM

## 2018-09-22 DIAGNOSIS — Z12.5 SCREENING FOR PROSTATE CANCER: ICD-10-CM

## 2018-09-22 DIAGNOSIS — R80.9 PROTEINURIA, UNSPECIFIED TYPE: ICD-10-CM

## 2018-09-22 DIAGNOSIS — H52.00 HYPERMETROPIA, UNSPECIFIED LATERALITY: ICD-10-CM

## 2018-09-22 DIAGNOSIS — E55.9 VITAMIN D DEFICIENCY: ICD-10-CM

## 2018-09-22 DIAGNOSIS — Z71.85 VACCINE COUNSELING: ICD-10-CM

## 2018-09-22 DIAGNOSIS — H52.4 PRESBYOPIA: ICD-10-CM

## 2018-09-22 DIAGNOSIS — H25.10 NUCLEAR SENILE CATARACT, UNSPECIFIED LATERALITY: ICD-10-CM

## 2018-09-22 DIAGNOSIS — I48.0 PAF (PAROXYSMAL ATRIAL FIBRILLATION) (HCC): ICD-10-CM

## 2018-09-22 DIAGNOSIS — M54.50 CHRONIC MIDLINE LOW BACK PAIN WITHOUT SCIATICA: ICD-10-CM

## 2018-09-22 DIAGNOSIS — I50.9 CHRONIC CONGESTIVE HEART FAILURE, UNSPECIFIED HEART FAILURE TYPE (HCC): ICD-10-CM

## 2018-09-22 DIAGNOSIS — Z98.890 HISTORY OF THORACIC AORTIC ANEURYSM REPAIR: ICD-10-CM

## 2018-09-22 DIAGNOSIS — E29.1 HYPOGONADISM MALE: ICD-10-CM

## 2018-09-22 DIAGNOSIS — Z12.11 SCREENING FOR COLON CANCER: ICD-10-CM

## 2018-09-22 DIAGNOSIS — Z79.01 CHRONIC ANTICOAGULATION: ICD-10-CM

## 2018-09-22 PROCEDURE — G0439 PPPS, SUBSEQ VISIT: HCPCS | Performed by: FAMILY MEDICINE

## 2018-09-22 PROCEDURE — G0444 DEPRESSION SCREEN ANNUAL: HCPCS | Performed by: FAMILY MEDICINE

## 2018-09-22 PROCEDURE — 99214 OFFICE O/P EST MOD 30 MIN: CPT | Performed by: FAMILY MEDICINE

## 2018-09-22 RX ORDER — IRBESARTAN AND HYDROCHLOROTHIAZIDE 150; 12.5 MG/1; MG/1
1 TABLET, FILM COATED ORAL DAILY
Qty: 90 TABLET | Refills: 1 | Status: SHIPPED | OUTPATIENT
Start: 2018-09-22 | End: 2018-09-22

## 2018-09-22 RX ORDER — METOPROLOL SUCCINATE 50 MG/1
50 TABLET, EXTENDED RELEASE ORAL DAILY
Qty: 90 TABLET | Refills: 1 | Status: SHIPPED | OUTPATIENT
Start: 2018-09-22 | End: 2018-09-22

## 2018-09-22 RX ORDER — ALBUTEROL SULFATE 90 UG/1
2 AEROSOL, METERED RESPIRATORY (INHALATION) EVERY 6 HOURS PRN
COMMUNITY

## 2018-09-22 RX ORDER — METOPROLOL SUCCINATE 50 MG/1
50 TABLET, EXTENDED RELEASE ORAL DAILY
Qty: 90 TABLET | Refills: 1 | Status: ON HOLD | OUTPATIENT
Start: 2018-09-22 | End: 2020-08-20

## 2018-09-22 RX ORDER — IRBESARTAN AND HYDROCHLOROTHIAZIDE 150; 12.5 MG/1; MG/1
1 TABLET, FILM COATED ORAL DAILY
Qty: 90 TABLET | Refills: 1 | Status: ON HOLD | OUTPATIENT
Start: 2018-09-22 | End: 2020-08-20

## 2018-09-22 RX ORDER — ACETAMINOPHEN AND CODEINE PHOSPHATE 300; 30 MG/1; MG/1
1 TABLET ORAL DAILY PRN
Qty: 30 TABLET | Refills: 0 | Status: ON HOLD | OUTPATIENT
Start: 2018-09-22 | End: 2020-08-20

## 2018-09-22 NOTE — PATIENT INSTRUCTIONS
Lety Pappas III's SCREENING SCHEDULE   Tests on this list are recommended by your physician but may not be covered, or covered at this frequency, by your insurer. Please check with your insurance carrier before scheduling to verify coverage.     PREVEN visit.  Limited to patients who meet one of the following criteria:   • Men who are 73-68 years old and have smoked more than 100 cigarettes in their lifetime   • Anyone with a family history    Colorectal Cancer Screening Covered up to Age 76     [place] visit on 08/02/16   • HEPATITIS B VACCINE, OVER 20   Orders placed or performed in visit on 06/01/15   • HEPATITIS B VACCINE, OVER 20   Orders placed or performed in visit on 04/27/15   • HEPATITIS B VACCINE, OVER 20    Medium/high risk factors:   Zack

## 2018-09-22 NOTE — PROGRESS NOTES
HPI:   Natali Ceballos is a 70year old male who presents for a Medicare Subsequent Annual Wellness visit (Pt already had Initial Annual Wellness). Pt. Is here for AWV and med check. DM -- pt.  Is trying to take natural supplements instead of meds of  for [facility] on file in [place].    Advance care planning including the explanation and discussion of advance directives standard forms performed Face to Face with patient and Family/surrogate (if present), and forms available to patient in AVS defibrillator) procedure     CKD (chronic kidney disease) stage 3, GFR 30-59 ml/min ([facility])     PAF (paroxysmal atrial fibrillation) ([facility])     Well adult exam     Cow's milk allergy     Mild intermittent asthma    Wt Readings from Last 3 Encounters:  09/22/1 (BONE DENSITY BUILDER OR)    NON FORMULARY    NON FORMULARY    NON FORMULARY    NON FORMULARY    GNP CORAL CALCIUM OR    TURMERIC CURCUMIN OR    Nutritional Supplements (FRUIT & VEGETABLE DAILY OR)    Nutritional Supplements (FRUIT & VEGETABLE DAILY OR) TURMERIC OR    Nabumetone 500 MG Oral Tab Take 0.5 tablets (250 mg total) by mouth 2 (two) times daily as needed for Pain. EPINEPHrine (EPIPEN 2-BIENVENIDO) 0.3 MG/0.3ML Injection Device Inject  as directed.    Glucose Blood (FREESTYLE LITE) In Vitro Strip 1 E blurred vision or double vision  HEENT: denies nasal congestion, sinus pain or ST  LUNGS: denies shortness of breath with exertion  CARDIOVASCULAR: denies chest pain on exertion  GI: denies abdominal pain, denies heartburn  : 1 per night nocturia, no com cyanosis or edema   Pulses: 2+ and symmetric   Skin: Skin color, texture, turgor normal, no rashes or lesions   Lymph nodes: Cervical, supraclavicular, and axillary nodes normal   Neurologic: Normal     Bilateral barefoot skin diabetic exam is normal, visu mouth 2 (two) times daily with meals. Type 2 diabetes mellitus with stage 3 chronic kidney disease, without long-term current use of insulin (HCC) [E11.22, N18.3]  -     COMP METABOLIC PANEL (14);  Future  -     HEMOGLOBIN A1C; Future  -     MICROALB/CRE Future  -     LIPID PANEL;  Future    Vaccine counseling  -     ZOSTER VACC RECOMBINANT IM NJX; Future    Need for vaccination  -     ZOSTER VACC RECOMBINANT IM NJX; Future    Encounter for annual health examination    Screening for prostate cancer  -     C embolism  Stable    7. Chronic anticoagulation  Stable  - CBC WITH DIFFERENTIAL WITH PLATELET; Future    8.  Uncontrolled type 2 diabetes mellitus with other circulatory complication, without long-term current use of insulin (HCC)  Stable  - COMP METABOLIC unspecified laterality  Stable    22. Hypermetropia, unspecified laterality  Stable    23. Presbyopia  Stable    24. Other seborrheic keratosis  Stable    25.  Benign prostatic hyperplasia without lower urinary tract symptoms  Stable  - PSA TOTAL W REFLEX T FORMULARY;   - NON FORMULARY;   - NON FORMULARY;   - NON FORMULARY;   - Omega-3 Fatty Acids (FISH OIL OR);   - NON FORMULARY;   - NON FORMULARY;   - NON FORMULARY;   - NON FORMULARY;   - NON FORMULARY;   - NON FORMULARY;   - NON FORMULARY;   - Nutritional needed for Wheezing.  - Acetaminophen-Codeine (TYLENOL WITH CODEINE #3) 300-30 MG Oral Tab; Take 1 tablet by mouth daily as needed for Pain. Dispense: 30 tablet; Refill: 0    Reinforced healthy diet, lifestyle, and exercise. Lab work ordered.   I spent 2 Blood Annually Occult Blood Result (no units)   Date Value   08/17/2018 Negative for Occult Blood    No flowsheet data found.     Glaucoma Screening      Ophthalmology Visit Annually: Diabetics, FHx Glaucoma, AA>50, > 65 Data entered on: 9/18/2017 data found. Drug Serum Conc  Annually No results found for: DIGOXIN, DIG, VALP No flowsheet data found.        Diabetes      HgbA1C  Annually HGBA1C (%)   Date Value   06/04/2014 6.6 (H)     HgbA1C (%)   Date Value   08/17/2018 7.4 (H)       No flowsheet

## 2018-09-24 ENCOUNTER — OFFICE VISIT (OUTPATIENT)
Dept: HEMATOLOGY/ONCOLOGY | Facility: HOSPITAL | Age: 68
End: 2018-09-24
Attending: INTERNAL MEDICINE
Payer: MEDICARE

## 2018-09-24 VITALS
WEIGHT: 183.31 LBS | HEART RATE: 76 BPM | DIASTOLIC BLOOD PRESSURE: 89 MMHG | SYSTOLIC BLOOD PRESSURE: 132 MMHG | RESPIRATION RATE: 18 BRPM | BODY MASS INDEX: 29 KG/M2 | TEMPERATURE: 97 F

## 2018-09-24 DIAGNOSIS — N18.30 CKD (CHRONIC KIDNEY DISEASE) STAGE 3, GFR 30-59 ML/MIN (HCC): ICD-10-CM

## 2018-09-24 DIAGNOSIS — Z79.01 CHRONIC ANTICOAGULATION: Primary | ICD-10-CM

## 2018-09-24 DIAGNOSIS — Z86.711 HISTORY OF PULMONARY EMBOLISM: ICD-10-CM

## 2018-09-24 PROCEDURE — 99213 OFFICE O/P EST LOW 20 MIN: CPT | Performed by: INTERNAL MEDICINE

## 2018-09-24 NOTE — PROGRESS NOTES
Hematology Clinic Follow Up Visit    Patient Name: Andrew Coronado  Medical Record Number: [id number]    YOB: 1950   PCP: Dr. Chary Lang    Reason for Consultation:  Andrew Coronado was seen today for the diagnosis of PE    Hematologi for malignant neoplasm of prostate     Past Surgical History:  2007: CABG  01/03/2011: COLONOSCOPY  No date: OTHER SURGICAL HISTORY      Comment:  aortic aneurysm repair  2/2011: OTHER SURGICAL HISTORY      Comment:  reimplantation of bypass grafts  2/2011 FORMULARY  Disp:  Rfl:    NON FORMULARY  Disp:  Rfl:    NON FORMULARY  Disp:  Rfl:    NON FORMULARY  Disp:  Rfl:    NON FORMULARY  Disp:  Rfl:    NON FORMULARY  Disp:  Rfl:    Nutritional Supplements (ENSURE IMMUNE HEALTH OR)  Disp:  Rfl:    ACAI BERRY OR Rfl:    NON FORMULARY  Disp:  Rfl:    NON FORMULARY  Disp:  Rfl:    QUERCETIN OR  Disp:  Rfl:    Red Yeast Rice Extract (RED YEAST RICE OR)  Disp:  Rfl:    NON FORMULARY  Disp:  Rfl:    SELENIUM ER OR  Disp:  Rfl:    NON FORMULARY  Disp:  Rfl:    NON FORMU Other Disorders Associated Father    • Diabetes Father    • Substance Abuse Brother    • Heart Disease Maternal Grandfather    • Heart Disease Maternal Grandmother    • Heart Disease Sister    • Hypertension Brother    • Hypertension Sister    • Thyroid Anat Sharon GFR 41 (L) 01/23/2018    GFRNAA 38 (L) 08/17/2018    GFRAA 43 (L) 08/17/2018    CA 9.4 08/17/2018    OSMOCALC 294 08/17/2018    ALKPHO 60 08/17/2018    AST 21 08/17/2018    ALT 26 08/17/2018    BILT 0.3 08/17/2018    TP 7.5 08/17/2018    ALB 4.1 08/17/2018

## 2018-09-25 ENCOUNTER — PRIOR ORIGINAL RECORDS (OUTPATIENT)
Dept: OTHER | Age: 68
End: 2018-09-25

## 2018-09-27 ENCOUNTER — PRIOR ORIGINAL RECORDS (OUTPATIENT)
Dept: OTHER | Age: 68
End: 2018-09-27

## 2018-09-27 ENCOUNTER — MYAURORA ACCOUNT LINK (OUTPATIENT)
Dept: OTHER | Age: 68
End: 2018-09-27

## 2018-09-28 RX ORDER — SILDENAFIL CITRATE 20 MG/1
TABLET ORAL
Qty: 30 TABLET | Refills: 0 | Status: ON HOLD | OUTPATIENT
Start: 2018-09-28 | End: 2020-08-20

## 2018-09-28 NOTE — TELEPHONE ENCOUNTER
Non protocol medication. Last refill 2014.   Patient requesting, please approve if appropriate, thank you

## 2018-10-02 ENCOUNTER — HOSPITAL ENCOUNTER (OUTPATIENT)
Dept: CV DIAGNOSTICS | Facility: HOSPITAL | Age: 68
Discharge: HOME | End: 2018-10-02
Attending: INTERNAL MEDICINE
Payer: MEDICARE

## 2018-10-02 DIAGNOSIS — R07.9 CHEST PAIN: ICD-10-CM

## 2018-10-02 PROCEDURE — 93018 CV STRESS TEST I&R ONLY: CPT | Performed by: INTERNAL MEDICINE

## 2018-10-02 PROCEDURE — 78452 HT MUSCLE IMAGE SPECT MULT: CPT | Performed by: INTERNAL MEDICINE

## 2018-10-02 PROCEDURE — 93017 CV STRESS TEST TRACING ONLY: CPT | Performed by: INTERNAL MEDICINE

## 2018-10-04 ENCOUNTER — LAB ENCOUNTER (OUTPATIENT)
Dept: LAB | Facility: HOSPITAL | Age: 68
End: 2018-10-04
Attending: INTERNAL MEDICINE
Payer: MEDICARE

## 2018-10-04 ENCOUNTER — PRIOR ORIGINAL RECORDS (OUTPATIENT)
Dept: OTHER | Age: 68
End: 2018-10-04

## 2018-10-04 DIAGNOSIS — I71.2 ANEURYSM, AORTA, THORACIC (HCC): ICD-10-CM

## 2018-10-04 DIAGNOSIS — I10 ESSENTIAL HYPERTENSION, MALIGNANT: Primary | ICD-10-CM

## 2018-10-04 LAB
ALBUMIN: 4.1 G/DL
ALKALINE PHOSPHATATE(ALK PHOS): 60 IU/L
BILIRUBIN TOTAL: 0.3 MG/DL
BUN: 29 MG/DL
CALCIUM: 9.4 MG/DL
CHLORIDE: 105 MEQ/L
CHOLESTEROL, TOTAL: 171 MG/DL
CREATININE, SERUM: 1.8 MG/DL
ESR (SED RATE): 11 MM/HR
GLOBULIN: 3.4 G/DL
GLUCOSE: 132 MG/DL
HDL CHOLESTEROL: 35 MG/DL
HEMATOCRIT: 43.3 %
HEMOGLOBIN A1C: 7.4 %
HEMOGLOBIN: 14.4 G/DL
LDL CHOLESTEROL: 89 MG/DL
PLATELETS: 170 K/UL
POTASSIUM, SERUM: 4.3 MEQ/L
PROTEIN, TOTAL: 7.5 G/DL
RED BLOOD COUNT: 4.58 X 10-6/U
SGOT (AST): 21 IU/L
SGPT (ALT): 26 IU/L
SODIUM: 138 MEQ/L
TRIGLYCERIDES: 236 MG/DL
WHITE BLOOD COUNT: 7.7 X 10-3/U

## 2018-10-04 PROCEDURE — 80048 BASIC METABOLIC PNL TOTAL CA: CPT

## 2018-10-04 PROCEDURE — 36415 COLL VENOUS BLD VENIPUNCTURE: CPT

## 2018-10-05 ENCOUNTER — PRIOR ORIGINAL RECORDS (OUTPATIENT)
Dept: OTHER | Age: 68
End: 2018-10-05

## 2018-10-08 ENCOUNTER — PRIOR ORIGINAL RECORDS (OUTPATIENT)
Dept: OTHER | Age: 68
End: 2018-10-08

## 2018-10-08 ENCOUNTER — HOSPITAL ENCOUNTER (OUTPATIENT)
Dept: GENERAL RADIOLOGY | Facility: HOSPITAL | Age: 68
Discharge: HOME | End: 2018-10-08
Attending: INTERNAL MEDICINE
Payer: MEDICARE

## 2018-10-08 DIAGNOSIS — Z01.818 PRE-OP TESTING: ICD-10-CM

## 2018-10-08 PROCEDURE — 71046 X-RAY EXAM CHEST 2 VIEWS: CPT | Performed by: INTERNAL MEDICINE

## 2018-10-09 ENCOUNTER — PATIENT MESSAGE (OUTPATIENT)
Dept: FAMILY MEDICINE CLINIC | Facility: CLINIC | Age: 68
End: 2018-10-09

## 2018-10-09 ENCOUNTER — PRIOR ORIGINAL RECORDS (OUTPATIENT)
Dept: OTHER | Age: 68
End: 2018-10-09

## 2018-10-09 LAB
BUN: 34 MG/DL
CALCIUM: 8.9 MG/DL
CHLORIDE: 107 MEQ/L
CREATININE, SERUM: 2.09 MG/DL
GLUCOSE: 175 MG/DL
POTASSIUM, SERUM: 4.1 MEQ/L
SODIUM: 141 MEQ/L

## 2018-10-09 NOTE — H&P
ABDI COPELAND  : 1950  ACCOUNT:  [id number]  [phone]  PCP:    TODAY'S DATE: 10/08/2018  DICTATED BY:  [Dr. Saida Salas: [Followup of Aneurysm, thoracic ascending and Followup of Hypertriglyceridemia.]    HPI:    [On 10/08/20 yoga and curling. DIET: low carb/sugar. MARITAL STATUS: . OCCUPATION: consultant.      ALLERGIES: Bactrim - Oral and Darvocet-N 100 - Oral    MEDICATIONS: Selected prescriptions see below    VITAL SIGNS: [B/P - 140/86 , Pulse - 72, Weight -  182, He renal failure were discussed with the patient verbalized understanding.   I will see him in the office within 1-2 weeks after his angiogram.]    PRESCRIPTIONS:   11/16/17 *Metoprolol Succinate 25MG      1 TABLET DAILY.                          09/27/18 Robert

## 2018-10-10 ENCOUNTER — HOSPITAL ENCOUNTER (OUTPATIENT)
Dept: INTERVENTIONAL RADIOLOGY/VASCULAR | Facility: HOSPITAL | Age: 68
Discharge: HOME | End: 2018-10-10
Attending: INTERNAL MEDICINE | Admitting: INTERNAL MEDICINE

## 2018-10-10 VITALS
WEIGHT: 181 LBS | HEART RATE: 69 BPM | SYSTOLIC BLOOD PRESSURE: 149 MMHG | RESPIRATION RATE: 16 BRPM | OXYGEN SATURATION: 98 % | BODY MASS INDEX: 28.41 KG/M2 | DIASTOLIC BLOOD PRESSURE: 84 MMHG | HEIGHT: 67 IN

## 2018-10-10 DIAGNOSIS — R94.39 ABNORMAL STRESS TEST: ICD-10-CM

## 2018-10-10 PROCEDURE — 99152 MOD SED SAME PHYS/QHP 5/>YRS: CPT

## 2018-10-10 PROCEDURE — 82962 GLUCOSE BLOOD TEST: CPT

## 2018-10-10 PROCEDURE — 93455 CORONARY ART/GRFT ANGIO S&I: CPT

## 2018-10-10 PROCEDURE — 99153 MOD SED SAME PHYS/QHP EA: CPT

## 2018-10-10 PROCEDURE — 85025 COMPLETE CBC W/AUTO DIFF WBC: CPT | Performed by: INTERNAL MEDICINE

## 2018-10-10 PROCEDURE — B2131ZZ FLUOROSCOPY OF MULTIPLE CORONARY ARTERY BYPASS GRAFTS USING LOW OSMOLAR CONTRAST: ICD-10-PCS | Performed by: INTERNAL MEDICINE

## 2018-10-10 RX ORDER — LIDOCAINE HYDROCHLORIDE 10 MG/ML
INJECTION, SOLUTION EPIDURAL; INFILTRATION; INTRACAUDAL; PERINEURAL
Status: COMPLETED
Start: 2018-10-10 | End: 2018-10-10

## 2018-10-10 RX ORDER — MIDAZOLAM HYDROCHLORIDE 1 MG/ML
INJECTION INTRAMUSCULAR; INTRAVENOUS
Status: COMPLETED
Start: 2018-10-10 | End: 2018-10-10

## 2018-10-10 RX ORDER — SODIUM CHLORIDE 9 MG/ML
INJECTION, SOLUTION INTRAVENOUS CONTINUOUS
Status: ACTIVE | OUTPATIENT
Start: 2018-10-10 | End: 2018-10-10

## 2018-10-10 RX ORDER — SODIUM CHLORIDE 9 MG/ML
INJECTION, SOLUTION INTRAVENOUS CONTINUOUS
Status: DISCONTINUED | OUTPATIENT
Start: 2018-10-10 | End: 2018-10-10

## 2018-10-10 RX ORDER — ASPIRIN 81 MG/1
TABLET, CHEWABLE ORAL
Status: COMPLETED
Start: 2018-10-10 | End: 2018-10-10

## 2018-10-10 RX ORDER — ASPIRIN 81 MG/1
324 TABLET, CHEWABLE ORAL ONCE
Status: COMPLETED | OUTPATIENT
Start: 2018-10-10 | End: 2018-10-10

## 2018-10-10 RX ORDER — HEPARIN SODIUM 5000 [USP'U]/ML
INJECTION, SOLUTION INTRAVENOUS; SUBCUTANEOUS
Status: COMPLETED
Start: 2018-10-10 | End: 2018-10-10

## 2018-10-10 RX ADMIN — SODIUM CHLORIDE: 9 INJECTION, SOLUTION INTRAVENOUS at 07:20:00

## 2018-10-10 RX ADMIN — ASPIRIN 324 MG: 81 TABLET, CHEWABLE ORAL at 07:40:00

## 2018-10-10 NOTE — PROGRESS NOTES
Rc'd pt from cath lab in stable condition. VSS. Angioseal to right groin is soft, clean and dry. No bleeding or hematoma. Pt denies c/o pain or discomfort. Pt to remain on bedrest for 2hrs. Dr James Benson at bedside. 15:30: Bedrest completed.  Pt ambulated a

## 2018-10-10 NOTE — H&P
History & Physical Examination    Patient Name: Rios Galeana  MRN: [id number]  [facility]: [id number]  YOB: 1950    Diagnosis: Coronary artery disease, abnormal stress test    Present Illness: Cardiac catheterization      Medications Prior to A time   NON FORMULARY Deflect O  Disp:  Rfl:  Past Week at Unknown time   NON FORMULARY Desiccated and Defatted Argentinian Beef  Disp:  Rfl:  Past Week at Unknown time   NON FORMULARY Fermented Broccoli Sprouts (Metformin replacement)  Disp:  Rfl:  Past We daily. Disp:  Rfl:  Past Week at Unknown time   NON FORMULARY Isotonix OPC-3 Oliogomeric Proanthocyanidins  Disp:  Rfl:  Past Week at Unknown time   RESVERATROL OR  Disp:  Rfl:  Past Week at Unknown time   NON FORMULARY Isotonix Ultimate Longevity formula Disp:  Rfl:  Past Week at Unknown time   NON FORMULARY Organic Tomato Juice Powder  Disp:  Rfl:  Past Week at Unknown time   NON FORMULARY Organic Wheat Grass Juice Powder  Disp:  Rfl:  Past Week at Unknown time   NON FORMULARY Phyto D 2000  Disp:  Rfl:  P Rfl:  Past Week at Unknown time   Sildenafil Citrate 20 MG Oral Tab TAKE TWO AND ONE-HALF TABLET BY MOUTH 30 MIN BEFORE INTERCOURSE Disp: 30 tablet Rfl: 0 Unknown at Unknown time   Albuterol Sulfate HFA (PROAIR HFA) 108 (90 Base) MCG/ACT Inhalation Aero So unspecified endocrine, nutritional, metabolic, and immunity disorders    • Screening for other and unspecified genitourinary condition    • Screening for thyroid disorder    • Special screening for malignant neoplasm of prostate      Past Surgical History:

## 2018-10-10 NOTE — PROCEDURES
[facility]  Angiogram Procedure Note    Ashley García III Location: Cath Lab    CSN [id number] MRN [id number]   Admission Date 10/10/2018 Procedure Date 10/10/2018   Attending Physician Burgess Marielle MD Procedure Physician Trinh Marie MD     Pre-P stenosis but it does not appear to be critical  4. The saphenous vein graft to the diagonal branch is patent. There is a 40-50% ostial stenosis  5. The LIMA to the LAD is widely patent  6. The left main coronary artery has 20-30% diffuse disease  7.   Digna Anguiano

## 2018-10-11 RX ORDER — ASPIRIN 81 MG/1
81 TABLET, CHEWABLE ORAL DAILY
Status: ON HOLD | COMMUNITY
End: 2021-07-14

## 2018-10-11 NOTE — TELEPHONE ENCOUNTER
From: Clyde Barrios III  To: Brook Quiroga DO  Sent: 10/9/2018 1:17 PM CDT  Subject: Non-Urgent Medical Question    It appears the doctor I saw for the colonoscopy has left. The office has no records of the procedure.  According to [facility] who was with me patrica

## 2018-10-12 NOTE — TELEPHONE ENCOUNTER
As I told him in the [facility] message -- since it will be due in 1.5 years, I just recommend doing the colonoscopy.

## 2018-10-15 ENCOUNTER — PATIENT MESSAGE (OUTPATIENT)
Dept: FAMILY MEDICINE CLINIC | Facility: CLINIC | Age: 68
End: 2018-10-15

## 2018-10-15 DIAGNOSIS — Z12.11 SCREENING FOR COLON CANCER: Primary | ICD-10-CM

## 2018-10-16 NOTE — TELEPHONE ENCOUNTER
From: Shamika Villareal III  To: Neptali Rutledge DO  Sent: 10/15/2018 1:33 PM CDT  Subject: Non-Urgent Medical Question    Thank you for the response. I will need a referral for the colonoscopy. ..

## 2018-10-18 ENCOUNTER — PRIOR ORIGINAL RECORDS (OUTPATIENT)
Dept: OTHER | Age: 68
End: 2018-10-18

## 2018-10-22 ENCOUNTER — MYAURORA ACCOUNT LINK (OUTPATIENT)
Dept: OTHER | Age: 68
End: 2018-10-22

## 2018-10-22 ENCOUNTER — PRIOR ORIGINAL RECORDS (OUTPATIENT)
Dept: OTHER | Age: 68
End: 2018-10-22

## 2018-10-31 ENCOUNTER — PRIOR ORIGINAL RECORDS (OUTPATIENT)
Dept: OTHER | Age: 68
End: 2018-10-31

## 2018-11-08 ENCOUNTER — PRIOR ORIGINAL RECORDS (OUTPATIENT)
Dept: OTHER | Age: 68
End: 2018-11-08

## 2018-11-08 ENCOUNTER — LAB ENCOUNTER (OUTPATIENT)
Dept: LAB | Facility: HOSPITAL | Age: 68
End: 2018-11-08
Attending: INTERNAL MEDICINE
Payer: MEDICARE

## 2018-11-08 DIAGNOSIS — Z79.01 CHRONIC ANTICOAGULATION: ICD-10-CM

## 2018-11-08 DIAGNOSIS — E78.5 DYSLIPIDEMIA: ICD-10-CM

## 2018-11-08 DIAGNOSIS — I10 PRIMARY HYPERTENSION: Primary | ICD-10-CM

## 2018-11-08 DIAGNOSIS — E55.9 VITAMIN D DEFICIENCY: ICD-10-CM

## 2018-11-08 DIAGNOSIS — I25.810 CORONARY ARTERY DISEASE INVOLVING CORONARY BYPASS GRAFT OF NATIVE HEART WITHOUT ANGINA PECTORIS: ICD-10-CM

## 2018-11-08 DIAGNOSIS — N18.30 CKD (CHRONIC KIDNEY DISEASE) STAGE 3, GFR 30-59 ML/MIN (HCC): ICD-10-CM

## 2018-11-08 DIAGNOSIS — E11.65 UNCONTROLLED TYPE 2 DIABETES MELLITUS WITH OTHER CIRCULATORY COMPLICATION, WITHOUT LONG-TERM CURRENT USE OF INSULIN: ICD-10-CM

## 2018-11-08 DIAGNOSIS — I10 ESSENTIAL HYPERTENSION: ICD-10-CM

## 2018-11-08 DIAGNOSIS — E04.1 THYROID NODULE: ICD-10-CM

## 2018-11-08 DIAGNOSIS — N40.0 BENIGN PROSTATIC HYPERPLASIA WITHOUT LOWER URINARY TRACT SYMPTOMS: ICD-10-CM

## 2018-11-08 DIAGNOSIS — E11.59 UNCONTROLLED TYPE 2 DIABETES MELLITUS WITH OTHER CIRCULATORY COMPLICATION, WITHOUT LONG-TERM CURRENT USE OF INSULIN: ICD-10-CM

## 2018-11-08 DIAGNOSIS — N18.30 TYPE 2 DIABETES MELLITUS WITH STAGE 3 CHRONIC KIDNEY DISEASE, WITHOUT LONG-TERM CURRENT USE OF INSULIN (HCC): ICD-10-CM

## 2018-11-08 DIAGNOSIS — E11.22 TYPE 2 DIABETES MELLITUS WITH STAGE 3 CHRONIC KIDNEY DISEASE, WITHOUT LONG-TERM CURRENT USE OF INSULIN (HCC): ICD-10-CM

## 2018-11-08 PROCEDURE — 84153 ASSAY OF PSA TOTAL: CPT

## 2018-11-08 PROCEDURE — 83036 HEMOGLOBIN GLYCOSYLATED A1C: CPT

## 2018-11-08 PROCEDURE — 83721 ASSAY OF BLOOD LIPOPROTEIN: CPT

## 2018-11-08 PROCEDURE — 82043 UR ALBUMIN QUANTITATIVE: CPT

## 2018-11-08 PROCEDURE — 85025 COMPLETE CBC W/AUTO DIFF WBC: CPT

## 2018-11-08 PROCEDURE — 82570 ASSAY OF URINE CREATININE: CPT

## 2018-11-08 PROCEDURE — 80061 LIPID PANEL: CPT

## 2018-11-08 PROCEDURE — 36415 COLL VENOUS BLD VENIPUNCTURE: CPT

## 2018-11-08 PROCEDURE — 80053 COMPREHEN METABOLIC PANEL: CPT

## 2018-11-08 PROCEDURE — 84443 ASSAY THYROID STIM HORMONE: CPT

## 2018-11-08 PROCEDURE — 82306 VITAMIN D 25 HYDROXY: CPT

## 2018-11-09 ENCOUNTER — PRIOR ORIGINAL RECORDS (OUTPATIENT)
Dept: OTHER | Age: 68
End: 2018-11-09

## 2018-11-09 LAB
ALBUMIN: 3.7 G/DL
ALKALINE PHOSPHATATE(ALK PHOS): 80 IU/L
BILIRUBIN TOTAL: 0.3 MG/DL
BUN: 34 MG/DL
CALCIUM: 8.6 MG/DL
CHLORIDE: 106 MEQ/L
CREATININE, SERUM: 1.94 MG/DL
GLOBULIN: 3.6 G/DL
GLUCOSE: 251 MG/DL
POTASSIUM, SERUM: 4.4 MEQ/L
PROTEIN, TOTAL: 7.3 G/DL
SGOT (AST): 21 IU/L
SGPT (ALT): 36 IU/L
SODIUM: 139 MEQ/L

## 2018-11-19 ENCOUNTER — MYAURORA ACCOUNT LINK (OUTPATIENT)
Dept: OTHER | Age: 68
End: 2018-11-19

## 2018-12-06 ENCOUNTER — PRIOR ORIGINAL RECORDS (OUTPATIENT)
Dept: OTHER | Age: 68
End: 2018-12-06

## 2019-01-01 ENCOUNTER — EXTERNAL RECORD (OUTPATIENT)
Dept: CARDIOLOGY | Age: 69
End: 2019-01-01

## 2019-02-03 ENCOUNTER — PATIENT OUTREACH (OUTPATIENT)
Dept: FAMILY MEDICINE CLINIC | Facility: CLINIC | Age: 69
End: 2019-02-03

## 2019-02-04 NOTE — PROGRESS NOTES
Pt states he will not have time to schedule until about 9 months. Pt also has MMAI and will not longer be seeing Yvette Koch. Did not want her to be removed as PCP just yet.

## 2019-02-28 VITALS
SYSTOLIC BLOOD PRESSURE: 130 MMHG | WEIGHT: 184 LBS | BODY MASS INDEX: 27.89 KG/M2 | DIASTOLIC BLOOD PRESSURE: 68 MMHG | HEIGHT: 68 IN | HEART RATE: 70 BPM

## 2019-02-28 VITALS
DIASTOLIC BLOOD PRESSURE: 80 MMHG | HEIGHT: 68 IN | HEART RATE: 65 BPM | BODY MASS INDEX: 27.74 KG/M2 | SYSTOLIC BLOOD PRESSURE: 130 MMHG | WEIGHT: 183 LBS

## 2019-02-28 VITALS
DIASTOLIC BLOOD PRESSURE: 82 MMHG | HEIGHT: 68 IN | HEART RATE: 74 BPM | SYSTOLIC BLOOD PRESSURE: 130 MMHG | WEIGHT: 184 LBS | BODY MASS INDEX: 27.89 KG/M2

## 2019-02-28 VITALS
BODY MASS INDEX: 27.37 KG/M2 | SYSTOLIC BLOOD PRESSURE: 132 MMHG | HEART RATE: 64 BPM | HEIGHT: 68 IN | DIASTOLIC BLOOD PRESSURE: 80 MMHG | WEIGHT: 180.6 LBS

## 2019-02-28 VITALS
HEART RATE: 79 BPM | TEMPERATURE: 98.1 F | RESPIRATION RATE: 18 BRPM | DIASTOLIC BLOOD PRESSURE: 84 MMHG | WEIGHT: 188.72 LBS | OXYGEN SATURATION: 99 % | SYSTOLIC BLOOD PRESSURE: 133 MMHG

## 2019-02-28 VITALS
HEIGHT: 68 IN | WEIGHT: 185 LBS | SYSTOLIC BLOOD PRESSURE: 140 MMHG | DIASTOLIC BLOOD PRESSURE: 82 MMHG | BODY MASS INDEX: 28.04 KG/M2 | HEART RATE: 76 BPM

## 2019-02-28 VITALS
BODY MASS INDEX: 27.58 KG/M2 | SYSTOLIC BLOOD PRESSURE: 140 MMHG | DIASTOLIC BLOOD PRESSURE: 86 MMHG | WEIGHT: 182 LBS | HEART RATE: 72 BPM | HEIGHT: 68 IN

## 2019-02-28 VITALS
SYSTOLIC BLOOD PRESSURE: 118 MMHG | WEIGHT: 181 LBS | HEIGHT: 68 IN | BODY MASS INDEX: 27.43 KG/M2 | DIASTOLIC BLOOD PRESSURE: 74 MMHG

## 2019-03-01 VITALS
HEART RATE: 79 BPM | SYSTOLIC BLOOD PRESSURE: 141 MMHG | HEIGHT: 68 IN | RESPIRATION RATE: 18 BRPM | DIASTOLIC BLOOD PRESSURE: 97 MMHG | TEMPERATURE: 97.6 F | WEIGHT: 184.31 LBS | OXYGEN SATURATION: 99 % | BODY MASS INDEX: 27.93 KG/M2

## 2019-03-01 VITALS
DIASTOLIC BLOOD PRESSURE: 70 MMHG | HEIGHT: 68 IN | BODY MASS INDEX: 27.89 KG/M2 | HEART RATE: 68 BPM | WEIGHT: 184 LBS | SYSTOLIC BLOOD PRESSURE: 136 MMHG

## 2019-03-20 RX ORDER — METOPROLOL SUCCINATE 25 MG/1
1 TABLET, EXTENDED RELEASE ORAL DAILY
COMMUNITY
Start: 2018-12-06 | End: 2019-08-12 | Stop reason: SDUPTHER

## 2019-03-20 RX ORDER — LUTEIN 6 MG
TABLET ORAL
COMMUNITY

## 2019-03-20 RX ORDER — IRBESARTAN AND HYDROCHLOROTHIAZIDE 300; 12.5 MG/1; MG/1
1 TABLET, FILM COATED ORAL DAILY
COMMUNITY
Start: 2018-10-22 | End: 2019-06-17 | Stop reason: SDUPTHER

## 2019-03-20 RX ORDER — OMEGA-3 FATTY ACIDS/FISH OIL 300-1000MG
CAPSULE ORAL
COMMUNITY

## 2019-03-25 ENCOUNTER — APPOINTMENT (OUTPATIENT)
Dept: HEMATOLOGY/ONCOLOGY | Facility: HOSPITAL | Age: 69
End: 2019-03-25
Attending: INTERNAL MEDICINE
Payer: MEDICARE

## 2019-03-27 PROCEDURE — 93296 REM INTERROG EVL PM/IDS: CPT | Performed by: INTERNAL MEDICINE

## 2019-04-09 ENCOUNTER — PATIENT OUTREACH (OUTPATIENT)
Dept: FAMILY MEDICINE CLINIC | Facility: CLINIC | Age: 69
End: 2019-04-09

## 2019-04-09 NOTE — PROGRESS NOTES
Patient has diagnosis of diabetes. Patient's last diabetic eye exam 08/2017. Patient is due for diabetic eye exam.  Patient has previously seen [facility].   Please advise patient to follow-up with them regarding eye exam and have their office sen

## 2019-04-11 NOTE — PROGRESS NOTES
Pt is currently covered under [facility] mmai. Per last TE pt is no longer seeing Dr Tahmina Mercedes.

## 2019-04-22 ENCOUNTER — OFFICE VISIT (OUTPATIENT)
Dept: CARDIOLOGY | Age: 69
End: 2019-04-22

## 2019-04-22 VITALS
HEIGHT: 67 IN | WEIGHT: 184 LBS | SYSTOLIC BLOOD PRESSURE: 120 MMHG | BODY MASS INDEX: 28.88 KG/M2 | DIASTOLIC BLOOD PRESSURE: 80 MMHG | HEART RATE: 76 BPM

## 2019-04-22 DIAGNOSIS — I26.99 OTHER PULMONARY EMBOLISM WITHOUT ACUTE COR PULMONALE, UNSPECIFIED CHRONICITY (CMD): Primary | ICD-10-CM

## 2019-04-22 DIAGNOSIS — I10 ESSENTIAL HYPERTENSION: ICD-10-CM

## 2019-04-22 PROCEDURE — 99213 OFFICE O/P EST LOW 20 MIN: CPT | Performed by: INTERNAL MEDICINE

## 2019-04-22 RX ORDER — ALBUTEROL SULFATE 90 UG/1
2 AEROSOL, METERED RESPIRATORY (INHALATION)
COMMUNITY

## 2019-04-22 RX ORDER — ACETAMINOPHEN AND CODEINE PHOSPHATE 300; 30 MG/1; MG/1
1 TABLET ORAL
COMMUNITY
Start: 2018-09-22 | End: 2021-01-25

## 2019-04-22 RX ORDER — OMEPRAZOLE 20 MG/1
20 CAPSULE, DELAYED RELEASE ORAL
COMMUNITY
End: 2020-04-06

## 2019-04-22 RX ORDER — NABUMETONE 500 MG/1
250 TABLET, FILM COATED ORAL
COMMUNITY
Start: 2018-07-20 | End: 2019-07-20

## 2019-04-22 RX ORDER — EPINEPHRINE 0.3 MG/.3ML
INJECTION SUBCUTANEOUS
COMMUNITY

## 2019-04-22 RX ORDER — SILDENAFIL CITRATE 20 MG/1
TABLET ORAL
COMMUNITY
Start: 2018-09-28 | End: 2020-04-06 | Stop reason: CLARIF

## 2019-04-22 ASSESSMENT — ENCOUNTER SYMPTOMS
WEIGHT LOSS: 0
WEIGHT GAIN: 0
CHILLS: 0
SUSPICIOUS LESIONS: 0
ALLERGIC/IMMUNOLOGIC COMMENTS: NO NEW FOOD ALLERGIES
BRUISES/BLEEDS EASILY: 0
HEMATOCHEZIA: 0
HEMOPTYSIS: 0
FEVER: 0
COUGH: 0

## 2019-06-03 ENCOUNTER — APPOINTMENT (OUTPATIENT)
Dept: CARDIOLOGY | Age: 69
End: 2019-06-03
Attending: INTERNAL MEDICINE

## 2019-06-17 ENCOUNTER — TELEPHONE (OUTPATIENT)
Dept: CARDIOLOGY | Age: 69
End: 2019-06-17

## 2019-06-17 RX ORDER — IRBESARTAN AND HYDROCHLOROTHIAZIDE 300; 12.5 MG/1; MG/1
1 TABLET, FILM COATED ORAL DAILY
Qty: 90 TABLET | Refills: 2 | Status: SHIPPED | OUTPATIENT
Start: 2019-06-17 | End: 2019-08-12 | Stop reason: DRUGHIGH

## 2019-06-18 DIAGNOSIS — E78.5 DYSLIPIDEMIA: Primary | ICD-10-CM

## 2019-06-27 ENCOUNTER — HOSPITAL (OUTPATIENT)
Dept: OTHER | Age: 69
End: 2019-06-27
Attending: INTERNAL MEDICINE

## 2019-07-09 PROCEDURE — 93296 REM INTERROG EVL PM/IDS: CPT | Performed by: INTERNAL MEDICINE

## 2019-07-09 PROCEDURE — 93295 DEV INTERROG REMOTE 1/2/MLT: CPT | Performed by: INTERNAL MEDICINE

## 2019-07-30 ENCOUNTER — ANCILLARY PROCEDURE (OUTPATIENT)
Dept: CARDIOLOGY | Age: 69
End: 2019-07-30
Attending: INTERNAL MEDICINE

## 2019-07-30 ENCOUNTER — TELEPHONE (OUTPATIENT)
Dept: CARDIOLOGY | Age: 69
End: 2019-07-30

## 2019-07-30 ENCOUNTER — ANCILLARY ORDERS (OUTPATIENT)
Dept: CARDIOLOGY | Age: 69
End: 2019-07-30

## 2019-07-30 DIAGNOSIS — Z95.810 ICD (IMPLANTABLE CARDIOVERTER-DEFIBRILLATOR) IN PLACE: ICD-10-CM

## 2019-08-06 DIAGNOSIS — I10 ESSENTIAL HYPERTENSION: ICD-10-CM

## 2019-08-08 RX ORDER — IRBESARTAN AND HYDROCHLOROTHIAZIDE 150; 12.5 MG/1; MG/1
TABLET, FILM COATED ORAL
Qty: 76 TABLET | Refills: 0 | OUTPATIENT
Start: 2019-08-08

## 2019-08-12 ENCOUNTER — TELEPHONE (OUTPATIENT)
Dept: CARDIOLOGY | Age: 69
End: 2019-08-12

## 2019-08-12 RX ORDER — METOPROLOL SUCCINATE 25 MG/1
25 TABLET, EXTENDED RELEASE ORAL DAILY
Qty: 90 TABLET | Refills: 1 | Status: SHIPPED | OUTPATIENT
Start: 2019-08-12 | End: 2020-03-16

## 2019-08-12 RX ORDER — IRBESARTAN AND HYDROCHLOROTHIAZIDE 150; 12.5 MG/1; MG/1
1 TABLET, FILM COATED ORAL 2 TIMES DAILY
Qty: 180 TABLET | Refills: 1 | Status: SHIPPED | OUTPATIENT
Start: 2019-08-12 | End: 2020-03-16

## 2019-08-13 ENCOUNTER — TELEPHONE (OUTPATIENT)
Dept: CARDIOLOGY | Age: 69
End: 2019-08-13

## 2019-08-13 ENCOUNTER — E-ADVICE (OUTPATIENT)
Dept: CARDIOLOGY | Age: 69
End: 2019-08-13

## 2019-08-27 ENCOUNTER — APPOINTMENT (OUTPATIENT)
Dept: CT IMAGING | Facility: HOSPITAL | Age: 69
End: 2019-08-27
Attending: EMERGENCY MEDICINE
Payer: MEDICARE

## 2019-08-27 ENCOUNTER — HOSPITAL ENCOUNTER (EMERGENCY)
Facility: HOSPITAL | Age: 69
Discharge: HOME | End: 2019-08-27
Attending: EMERGENCY MEDICINE
Payer: MEDICARE

## 2019-08-27 VITALS
HEART RATE: 74 BPM | TEMPERATURE: 98 F | OXYGEN SATURATION: 97 % | DIASTOLIC BLOOD PRESSURE: 80 MMHG | RESPIRATION RATE: 18 BRPM | HEIGHT: 68 IN | BODY MASS INDEX: 27.58 KG/M2 | SYSTOLIC BLOOD PRESSURE: 112 MMHG | WEIGHT: 182 LBS

## 2019-08-27 DIAGNOSIS — N28.9 RENAL INSUFFICIENCY: ICD-10-CM

## 2019-08-27 DIAGNOSIS — K57.92 ACUTE DIVERTICULITIS: Primary | ICD-10-CM

## 2019-08-27 LAB
ANION GAP SERPL CALC-SCNC: 9 MMOL/L (ref 0–18)
BASOPHILS # BLD AUTO: 0.04 X10(3) UL (ref 0–0.2)
BASOPHILS NFR BLD AUTO: 0.4 %
BILIRUB UR QL: NEGATIVE
BUN BLD-MCNC: 63 MG/DL (ref 7–18)
BUN/CREAT SERPL: 22.5 (ref 10–20)
CALCIUM BLD-MCNC: 9 MG/DL (ref 8.5–10.1)
CHLORIDE SERPL-SCNC: 106 MMOL/L (ref 98–112)
CLARITY UR: CLEAR
CO2 SERPL-SCNC: 24 MMOL/L (ref 21–32)
COLOR UR: YELLOW
CREAT BLD-MCNC: 2.8 MG/DL (ref 0.7–1.3)
DEPRECATED RDW RBC AUTO: 46.1 FL (ref 35.1–46.3)
EOSINOPHIL # BLD AUTO: 0.14 X10(3) UL (ref 0–0.7)
EOSINOPHIL NFR BLD AUTO: 1.4 %
ERYTHROCYTE [DISTWIDTH] IN BLOOD BY AUTOMATED COUNT: 13.2 % (ref 11–15)
GLUCOSE BLD-MCNC: 230 MG/DL (ref 70–99)
GLUCOSE UR-MCNC: NEGATIVE MG/DL
HCT VFR BLD AUTO: 37.2 % (ref 39–53)
HGB BLD-MCNC: 12.4 G/DL (ref 13–17.5)
IMM GRANULOCYTES # BLD AUTO: 0.03 X10(3) UL (ref 0–1)
IMM GRANULOCYTES NFR BLD: 0.3 %
KETONES UR-MCNC: NEGATIVE MG/DL
LEUKOCYTE ESTERASE UR QL STRIP.AUTO: NEGATIVE
LYMPHOCYTES # BLD AUTO: 1.35 X10(3) UL (ref 1–4)
LYMPHOCYTES NFR BLD AUTO: 13.5 %
MCH RBC QN AUTO: 32 PG (ref 26–34)
MCHC RBC AUTO-ENTMCNC: 33.3 G/DL (ref 31–37)
MCV RBC AUTO: 95.9 FL (ref 80–100)
MONOCYTES # BLD AUTO: 0.64 X10(3) UL (ref 0.1–1)
MONOCYTES NFR BLD AUTO: 6.4 %
NEUTROPHILS # BLD AUTO: 7.79 X10 (3) UL (ref 1.5–7.7)
NEUTROPHILS # BLD AUTO: 7.79 X10(3) UL (ref 1.5–7.7)
NEUTROPHILS NFR BLD AUTO: 78 %
NITRITE UR QL STRIP.AUTO: NEGATIVE
OSMOLALITY SERPL CALC.SUM OF ELEC: 313 MOSM/KG (ref 275–295)
PH UR: 6 [PH] (ref 5–8)
PLATELET # BLD AUTO: 164 10(3)UL (ref 150–450)
POTASSIUM SERPL-SCNC: 4 MMOL/L (ref 3.5–5.1)
PROT UR-MCNC: NEGATIVE MG/DL
RBC # BLD AUTO: 3.88 X10(6)UL (ref 3.8–5.8)
RBC #/AREA URNS AUTO: 1 /HPF
SODIUM SERPL-SCNC: 139 MMOL/L (ref 136–145)
SP GR UR STRIP: 1.02 (ref 1–1.03)
TROPONIN I SERPL-MCNC: <0.045 NG/ML (ref ?–0.04)
UROBILINOGEN UR STRIP-ACNC: <2
VIT C UR-MCNC: NEGATIVE MG/DL
WBC # BLD AUTO: 10 X10(3) UL (ref 4–11)
WBC #/AREA URNS AUTO: <1 /HPF

## 2019-08-27 PROCEDURE — 85025 COMPLETE CBC W/AUTO DIFF WBC: CPT | Performed by: EMERGENCY MEDICINE

## 2019-08-27 PROCEDURE — 70450 CT HEAD/BRAIN W/O DYE: CPT | Performed by: EMERGENCY MEDICINE

## 2019-08-27 PROCEDURE — 96361 HYDRATE IV INFUSION ADD-ON: CPT

## 2019-08-27 PROCEDURE — 74176 CT ABD & PELVIS W/O CONTRAST: CPT | Performed by: EMERGENCY MEDICINE

## 2019-08-27 PROCEDURE — 99285 EMERGENCY DEPT VISIT HI MDM: CPT

## 2019-08-27 PROCEDURE — 80048 BASIC METABOLIC PNL TOTAL CA: CPT

## 2019-08-27 PROCEDURE — 81001 URINALYSIS AUTO W/SCOPE: CPT | Performed by: EMERGENCY MEDICINE

## 2019-08-27 PROCEDURE — 80048 BASIC METABOLIC PNL TOTAL CA: CPT | Performed by: EMERGENCY MEDICINE

## 2019-08-27 PROCEDURE — 84484 ASSAY OF TROPONIN QUANT: CPT | Performed by: EMERGENCY MEDICINE

## 2019-08-27 PROCEDURE — 96360 HYDRATION IV INFUSION INIT: CPT

## 2019-08-27 PROCEDURE — 93005 ELECTROCARDIOGRAM TRACING: CPT

## 2019-08-27 PROCEDURE — 93010 ELECTROCARDIOGRAM REPORT: CPT | Performed by: EMERGENCY MEDICINE

## 2019-08-27 PROCEDURE — 85025 COMPLETE CBC W/AUTO DIFF WBC: CPT

## 2019-08-27 RX ORDER — ACETAMINOPHEN AND CODEINE PHOSPHATE 300; 30 MG/1; MG/1
1 TABLET ORAL EVERY 6 HOURS PRN
Qty: 10 TABLET | Refills: 0 | Status: SHIPPED | OUTPATIENT
Start: 2019-08-27 | End: 2019-09-03

## 2019-08-27 RX ORDER — AMOXICILLIN AND CLAVULANATE POTASSIUM 500; 125 MG/1; MG/1
1 TABLET, FILM COATED ORAL 2 TIMES DAILY
Qty: 20 TABLET | Refills: 0 | Status: SHIPPED | OUTPATIENT
Start: 2019-08-27 | End: 2019-09-06

## 2019-08-27 NOTE — ED INITIAL ASSESSMENT (HPI)
C/o L lower abdominal pain x3-4 days, continuous and varying in intensity. No n/v. Does report some mild dysuria and change in bowel habits.
DISCHARGE

## 2019-08-28 NOTE — ED PROVIDER NOTES
Patient Seen in: [facility] AND [facility] Emergency Department    History   Patient presents with:  Abdomen/Flank Pain (GI/)    Stated Complaint: abdominal pain for 5 days    HPI    Patient presents the emergency department complaining of 2 separate things. Drug use: No             Review of Systems    Positive for stated complaint: abdominal pain for 5 days  Other systems are as noted in HPI. Constitutional and vital signs reviewed. All other systems reviewed and negative except as noted above.     Zena normal limits   CBC W/ DIFFERENTIAL - Abnormal; Notable for the following components:    HGB 12.4 (*)     HCT 37.2 (*)     Neutrophil Absolute Prelim 7.79 (*)     Neutrophil Absolute 7.79 (*)     All other components within normal limits   TROPONIN I - Nor performed. 2. Small hiatal hernia. 3. Cholelithiasis. 4. Nonobstructing 2 mm left renal calculus. 5. Left greater than right renal cysts. 6. Coronary and peripheral atherosclerosis. Prior coronary artery bypass grafting as well as aortic valve repair.  7. Tab  Take 1 tablet by mouth every 6 (six) hours as needed for Pain. Qty: 10 tablet Refills: 0    !! - Potential duplicate medications found. Please discuss with provider.

## 2019-10-15 PROCEDURE — 93295 DEV INTERROG REMOTE 1/2/MLT: CPT | Performed by: INTERNAL MEDICINE

## 2019-10-15 PROCEDURE — 93296 REM INTERROG EVL PM/IDS: CPT | Performed by: INTERNAL MEDICINE

## 2019-11-07 ENCOUNTER — ANCILLARY PROCEDURE (OUTPATIENT)
Dept: CARDIOLOGY | Age: 69
End: 2019-11-07
Attending: INTERNAL MEDICINE

## 2019-11-07 DIAGNOSIS — Z95.810 ICD (IMPLANTABLE CARDIOVERTER-DEFIBRILLATOR) IN PLACE: ICD-10-CM

## 2019-12-14 DIAGNOSIS — Z95.2 S/P AVR: ICD-10-CM

## 2019-12-14 DIAGNOSIS — I25.810 CORONARY ARTERY DISEASE INVOLVING CORONARY BYPASS GRAFT OF NATIVE HEART WITHOUT ANGINA PECTORIS: ICD-10-CM

## 2019-12-14 DIAGNOSIS — I48.0 PAF (PAROXYSMAL ATRIAL FIBRILLATION) (HCC): ICD-10-CM

## 2019-12-16 RX ORDER — APIXABAN 2.5 MG/1
TABLET, FILM COATED ORAL
Qty: 180 TABLET | Refills: 0 | OUTPATIENT
Start: 2019-12-16

## 2019-12-16 NOTE — TELEPHONE ENCOUNTER
Spoke to pt and advised him that he is due for a medication follow up. Pt stated \"that's okay, I will go to a different provider. \"    Call was disconnected.

## 2019-12-16 NOTE — TELEPHONE ENCOUNTER
Please call patient is due for a hypertension, diabetes med check with . LOV 9/22/18 asked to return in 3 months. Thanks !

## 2020-02-03 ENCOUNTER — ANCILLARY ORDERS (OUTPATIENT)
Dept: CARDIOLOGY | Age: 70
End: 2020-02-03

## 2020-02-03 ENCOUNTER — TELEPHONE (OUTPATIENT)
Dept: CARDIOLOGY | Age: 70
End: 2020-02-03

## 2020-02-03 ENCOUNTER — ANCILLARY PROCEDURE (OUTPATIENT)
Dept: CARDIOLOGY | Age: 70
End: 2020-02-03
Attending: INTERNAL MEDICINE

## 2020-02-03 DIAGNOSIS — Z95.810 ICD (IMPLANTABLE CARDIOVERTER-DEFIBRILLATOR) IN PLACE: ICD-10-CM

## 2020-02-03 PROCEDURE — 93296 REM INTERROG EVL PM/IDS: CPT | Performed by: INTERNAL MEDICINE

## 2020-02-03 PROCEDURE — 93295 DEV INTERROG REMOTE 1/2/MLT: CPT | Performed by: INTERNAL MEDICINE

## 2020-02-03 PROCEDURE — X1114 CARDIAC DEVICE HOME CHECK - REMOTE UNSCHEDULED: HCPCS | Performed by: INTERNAL MEDICINE

## 2020-03-13 ENCOUNTER — TELEPHONE (OUTPATIENT)
Dept: CARDIOLOGY | Age: 70
End: 2020-03-13

## 2020-03-16 RX ORDER — METOPROLOL SUCCINATE 25 MG/1
TABLET, EXTENDED RELEASE ORAL
Qty: 90 TABLET | Refills: 3 | Status: SHIPPED | OUTPATIENT
Start: 2020-03-16 | End: 2020-06-24 | Stop reason: ALTCHOICE

## 2020-03-16 RX ORDER — IRBESARTAN AND HYDROCHLOROTHIAZIDE 150; 12.5 MG/1; MG/1
TABLET, FILM COATED ORAL
Qty: 180 TABLET | Refills: 3 | Status: SHIPPED | OUTPATIENT
Start: 2020-03-16 | End: 2020-09-10 | Stop reason: ALTCHOICE

## 2020-04-03 ENCOUNTER — E-ADVICE (OUTPATIENT)
Dept: CARDIOLOGY | Age: 70
End: 2020-04-03

## 2020-04-03 ENCOUNTER — TELEPHONE (OUTPATIENT)
Dept: CARDIOLOGY | Age: 70
End: 2020-04-03

## 2020-04-06 ENCOUNTER — V-VISIT (OUTPATIENT)
Dept: CARDIOLOGY | Age: 70
End: 2020-04-06

## 2020-04-06 VITALS
DIASTOLIC BLOOD PRESSURE: 84 MMHG | SYSTOLIC BLOOD PRESSURE: 138 MMHG | HEART RATE: 72 BPM | WEIGHT: 185 LBS | BODY MASS INDEX: 28.98 KG/M2

## 2020-04-06 DIAGNOSIS — Z98.890 S/P ASCENDING AORTIC ANEURYSM REPAIR: ICD-10-CM

## 2020-04-06 DIAGNOSIS — I26.99 OTHER PULMONARY EMBOLISM WITHOUT ACUTE COR PULMONALE, UNSPECIFIED CHRONICITY (CMD): ICD-10-CM

## 2020-04-06 DIAGNOSIS — Z95.2 HISTORY OF AORTIC VALVE REPLACEMENT: ICD-10-CM

## 2020-04-06 DIAGNOSIS — Z86.79 S/P ASCENDING AORTIC ANEURYSM REPAIR: ICD-10-CM

## 2020-04-06 DIAGNOSIS — E78.5 DYSLIPIDEMIA: ICD-10-CM

## 2020-04-06 DIAGNOSIS — I10 ESSENTIAL HYPERTENSION: Primary | ICD-10-CM

## 2020-04-06 PROCEDURE — 99214 OFFICE O/P EST MOD 30 MIN: CPT | Performed by: INTERNAL MEDICINE

## 2020-04-06 PROCEDURE — 3075F SYST BP GE 130 - 139MM HG: CPT | Performed by: INTERNAL MEDICINE

## 2020-04-06 PROCEDURE — 3079F DIAST BP 80-89 MM HG: CPT | Performed by: INTERNAL MEDICINE

## 2020-04-06 SDOH — SOCIAL STABILITY: SOCIAL NETWORK: ARE YOU MARRIED, WIDOWED, DIVORCED, SEPARATED, NEVER MARRIED, OR LIVING WITH A PARTNER?: DIVORCED

## 2020-04-06 SDOH — HEALTH STABILITY: PHYSICAL HEALTH: ON AVERAGE, HOW MANY MINUTES DO YOU ENGAGE IN EXERCISE AT THIS LEVEL?: 0 MIN

## 2020-04-06 SDOH — HEALTH STABILITY: PHYSICAL HEALTH: ON AVERAGE, HOW MANY DAYS PER WEEK DO YOU ENGAGE IN MODERATE TO STRENUOUS EXERCISE (LIKE A BRISK WALK)?: 0 DAYS

## 2020-04-06 ASSESSMENT — ENCOUNTER SYMPTOMS
HEMATOLOGIC/LYMPHATIC NEGATIVE: 1
RESPIRATORY NEGATIVE: 1
EYES NEGATIVE: 1
BACK PAIN: 1
ALLERGIC/IMMUNOLOGIC NEGATIVE: 1
GASTROINTESTINAL NEGATIVE: 1
CONSTITUTIONAL NEGATIVE: 1
PSYCHIATRIC NEGATIVE: 1
NEUROLOGICAL NEGATIVE: 1
ENDOCRINE NEGATIVE: 1

## 2020-04-10 ENCOUNTER — E-ADVICE (OUTPATIENT)
Dept: CARDIOLOGY | Age: 70
End: 2020-04-10

## 2020-04-20 ENCOUNTER — E-ADVICE (OUTPATIENT)
Dept: CARDIOLOGY | Age: 70
End: 2020-04-20

## 2020-04-20 ENCOUNTER — OFFICE VISIT (OUTPATIENT)
Dept: CARDIOLOGY | Age: 70
End: 2020-04-20

## 2020-04-20 VITALS
HEART RATE: 73 BPM | WEIGHT: 184 LBS | DIASTOLIC BLOOD PRESSURE: 94 MMHG | SYSTOLIC BLOOD PRESSURE: 135 MMHG | HEIGHT: 67 IN | BODY MASS INDEX: 28.88 KG/M2

## 2020-04-20 DIAGNOSIS — I10 ESSENTIAL HYPERTENSION: Primary | ICD-10-CM

## 2020-04-20 DIAGNOSIS — Z95.2 HISTORY OF AORTIC VALVE REPLACEMENT: ICD-10-CM

## 2020-04-20 DIAGNOSIS — Z95.810 ICD (IMPLANTABLE CARDIOVERTER-DEFIBRILLATOR) IN PLACE: ICD-10-CM

## 2020-04-20 PROCEDURE — 3075F SYST BP GE 130 - 139MM HG: CPT | Performed by: INTERNAL MEDICINE

## 2020-04-20 PROCEDURE — 3080F DIAST BP >= 90 MM HG: CPT | Performed by: INTERNAL MEDICINE

## 2020-04-20 PROCEDURE — 99213 OFFICE O/P EST LOW 20 MIN: CPT | Performed by: INTERNAL MEDICINE

## 2020-05-04 ENCOUNTER — MED REC SCAN ONLY (OUTPATIENT)
Dept: FAMILY MEDICINE CLINIC | Facility: CLINIC | Age: 70
End: 2020-05-04

## 2020-05-11 ENCOUNTER — ANCILLARY ORDERS (OUTPATIENT)
Dept: CARDIOLOGY | Age: 70
End: 2020-05-11

## 2020-05-11 ENCOUNTER — ANCILLARY PROCEDURE (OUTPATIENT)
Dept: CARDIOLOGY | Age: 70
End: 2020-05-11
Attending: INTERNAL MEDICINE

## 2020-05-11 DIAGNOSIS — Z95.810 ICD (IMPLANTABLE CARDIOVERTER-DEFIBRILLATOR) IN PLACE: ICD-10-CM

## 2020-05-11 PROCEDURE — X1114 CARDIAC DEVICE HOME CHECK - REMOTE UNSCHEDULED: HCPCS | Performed by: INTERNAL MEDICINE

## 2020-06-24 ENCOUNTER — E-ADVICE (OUTPATIENT)
Dept: CARDIOLOGY | Age: 70
End: 2020-06-24

## 2020-06-24 ENCOUNTER — TELEPHONE (OUTPATIENT)
Dept: CARDIOLOGY | Age: 70
End: 2020-06-24

## 2020-08-13 ENCOUNTER — TELEPHONE (OUTPATIENT)
Dept: CARDIOLOGY | Age: 70
End: 2020-08-13

## 2020-08-14 ENCOUNTER — ANCILLARY ORDERS (OUTPATIENT)
Dept: CARDIOLOGY | Age: 70
End: 2020-08-14

## 2020-08-14 ENCOUNTER — ANCILLARY PROCEDURE (OUTPATIENT)
Dept: CARDIOLOGY | Age: 70
End: 2020-08-14
Attending: INTERNAL MEDICINE

## 2020-08-14 DIAGNOSIS — Z95.810 ICD (IMPLANTABLE CARDIOVERTER-DEFIBRILLATOR) IN PLACE: ICD-10-CM

## 2020-08-14 PROCEDURE — X1114 CARDIAC DEVICE HOME CHECK - REMOTE UNSCHEDULED: HCPCS | Performed by: INTERNAL MEDICINE

## 2020-08-14 PROCEDURE — 93295 DEV INTERROG REMOTE 1/2/MLT: CPT | Performed by: INTERNAL MEDICINE

## 2020-08-14 PROCEDURE — 93296 REM INTERROG EVL PM/IDS: CPT | Performed by: INTERNAL MEDICINE

## 2020-08-17 ENCOUNTER — HOSPITAL ENCOUNTER (OUTPATIENT)
Facility: HOSPITAL | Age: 70
Setting detail: OBSERVATION
Discharge: HOME | End: 2020-08-20
Attending: EMERGENCY MEDICINE | Admitting: HOSPITALIST
Payer: MEDICARE

## 2020-08-17 ENCOUNTER — APPOINTMENT (OUTPATIENT)
Dept: GENERAL RADIOLOGY | Facility: HOSPITAL | Age: 70
End: 2020-08-17
Attending: EMERGENCY MEDICINE
Payer: MEDICARE

## 2020-08-17 DIAGNOSIS — R26.9 ABNORMAL GAIT: ICD-10-CM

## 2020-08-17 DIAGNOSIS — R79.89 AZOTEMIA: ICD-10-CM

## 2020-08-17 DIAGNOSIS — R07.9 CHEST PAIN OF UNCERTAIN ETIOLOGY: Primary | ICD-10-CM

## 2020-08-17 DIAGNOSIS — E87.6 HYPOKALEMIA: ICD-10-CM

## 2020-08-17 DIAGNOSIS — N17.9 ACUTE KIDNEY INJURY (HCC): ICD-10-CM

## 2020-08-17 DIAGNOSIS — R73.9 HYPERGLYCEMIA: ICD-10-CM

## 2020-08-17 DIAGNOSIS — E87.1 HYPONATREMIA: ICD-10-CM

## 2020-08-17 PROBLEM — E87.3 METABOLIC ALKALOSIS: Status: ACTIVE | Noted: 2020-08-17

## 2020-08-17 LAB
ALBUMIN SERPL-MCNC: 4 G/DL (ref 3.4–5)
ALBUMIN/GLOB SERPL: 1 {RATIO} (ref 1–2)
ALP LIVER SERPL-CCNC: 59 U/L (ref 45–117)
ALT SERPL-CCNC: 25 U/L (ref 16–61)
ANION GAP SERPL CALC-SCNC: 6 MMOL/L (ref 0–18)
AST SERPL-CCNC: 11 U/L (ref 15–37)
BASOPHILS # BLD AUTO: 0.03 X10(3) UL (ref 0–0.2)
BASOPHILS NFR BLD AUTO: 0.4 %
BILIRUB SERPL-MCNC: 0.5 MG/DL (ref 0.1–2)
BILIRUB UR QL STRIP.AUTO: NEGATIVE
BUN BLD-MCNC: 77 MG/DL (ref 7–18)
BUN/CREAT SERPL: 21.5 (ref 10–20)
CALCIUM BLD-MCNC: 10.5 MG/DL (ref 8.5–10.1)
CHLORIDE SERPL-SCNC: 89 MMOL/L (ref 98–112)
CLARITY UR REFRACT.AUTO: CLEAR
CO2 SERPL-SCNC: 35 MMOL/L (ref 21–32)
COLOR UR AUTO: YELLOW
CREAT BLD-MCNC: 3.58 MG/DL (ref 0.7–1.3)
CREAT UR-SCNC: 61.5 MG/DL
DEPRECATED RDW RBC AUTO: 43.8 FL (ref 35.1–46.3)
EOSINOPHIL # BLD AUTO: 0.17 X10(3) UL (ref 0–0.7)
EOSINOPHIL NFR BLD AUTO: 2 %
ERYTHROCYTE [DISTWIDTH] IN BLOOD BY AUTOMATED COUNT: 13 % (ref 11–15)
GLOBULIN PLAS-MCNC: 4.1 G/DL (ref 2.8–4.4)
GLUCOSE BLD-MCNC: 296 MG/DL (ref 70–99)
GLUCOSE BLD-MCNC: 337 MG/DL (ref 70–99)
GLUCOSE UR STRIP.AUTO-MCNC: 50 MG/DL
HCT VFR BLD AUTO: 44.1 % (ref 39–53)
HGB BLD-MCNC: 15.1 G/DL (ref 13–17.5)
IMM GRANULOCYTES # BLD AUTO: 0.02 X10(3) UL (ref 0–1)
IMM GRANULOCYTES NFR BLD: 0.2 %
KETONES UR STRIP.AUTO-MCNC: NEGATIVE MG/DL
LEUKOCYTE ESTERASE UR QL STRIP.AUTO: NEGATIVE
LYMPHOCYTES # BLD AUTO: 1.58 X10(3) UL (ref 1–4)
LYMPHOCYTES NFR BLD AUTO: 18.9 %
M PROTEIN MFR SERPL ELPH: 8.1 G/DL (ref 6.4–8.2)
MCH RBC QN AUTO: 31.8 PG (ref 26–34)
MCHC RBC AUTO-ENTMCNC: 34.2 G/DL (ref 31–37)
MCV RBC AUTO: 92.8 FL (ref 80–100)
MONOCYTES # BLD AUTO: 0.81 X10(3) UL (ref 0.1–1)
MONOCYTES NFR BLD AUTO: 9.7 %
NEUTROPHILS # BLD AUTO: 5.77 X10 (3) UL (ref 1.5–7.7)
NEUTROPHILS # BLD AUTO: 5.77 X10(3) UL (ref 1.5–7.7)
NEUTROPHILS NFR BLD AUTO: 68.8 %
NITRITE UR QL STRIP.AUTO: NEGATIVE
OSMOLALITY SERPL CALC.SUM OF ELEC: 306 MOSM/KG (ref 275–295)
PH UR STRIP.AUTO: 6 [PH] (ref 4.5–8)
PLATELET # BLD AUTO: 217 10(3)UL (ref 150–450)
POTASSIUM SERPL-SCNC: 3.5 MMOL/L (ref 3.5–5.1)
PROT UR STRIP.AUTO-MCNC: NEGATIVE MG/DL
RBC # BLD AUTO: 4.75 X10(6)UL (ref 3.8–5.8)
RBC UR QL AUTO: NEGATIVE
SARS-COV-2 RNA RESP QL NAA+PROBE: NOT DETECTED
SODIUM SERPL-SCNC: 130 MMOL/L (ref 136–145)
SODIUM SERPL-SCNC: 68 MMOL/L
SP GR UR STRIP.AUTO: 1.01 (ref 1–1.03)
TROPONIN I SERPL-MCNC: 0.09 NG/ML (ref ?–0.04)
UROBILINOGEN UR STRIP.AUTO-MCNC: <2 MG/DL
WBC # BLD AUTO: 8.4 X10(3) UL (ref 4–11)

## 2020-08-17 PROCEDURE — 71045 X-RAY EXAM CHEST 1 VIEW: CPT | Performed by: EMERGENCY MEDICINE

## 2020-08-17 PROCEDURE — 99220 INITIAL OBSERVATION CARE,LEVL III: CPT | Performed by: INTERNAL MEDICINE

## 2020-08-17 RX ORDER — ASPIRIN 81 MG/1
324 TABLET, CHEWABLE ORAL ONCE
Status: COMPLETED | OUTPATIENT
Start: 2020-08-17 | End: 2020-08-17

## 2020-08-17 RX ORDER — DEXTROSE MONOHYDRATE 25 G/50ML
50 INJECTION, SOLUTION INTRAVENOUS
Status: DISCONTINUED | OUTPATIENT
Start: 2020-08-17 | End: 2020-08-20

## 2020-08-17 RX ORDER — SODIUM CHLORIDE 9 MG/ML
INJECTION, SOLUTION INTRAVENOUS CONTINUOUS
Status: DISCONTINUED | OUTPATIENT
Start: 2020-08-17 | End: 2020-08-19

## 2020-08-17 RX ORDER — INSULIN ASPART 100 [IU]/ML
0.15 INJECTION, SOLUTION INTRAVENOUS; SUBCUTANEOUS ONCE
Status: COMPLETED | OUTPATIENT
Start: 2020-08-17 | End: 2020-08-17

## 2020-08-17 RX ORDER — ACETAMINOPHEN 325 MG/1
650 TABLET ORAL EVERY 6 HOURS PRN
Status: DISCONTINUED | OUTPATIENT
Start: 2020-08-17 | End: 2020-08-20

## 2020-08-17 RX ORDER — ONDANSETRON 2 MG/ML
4 INJECTION INTRAMUSCULAR; INTRAVENOUS EVERY 6 HOURS PRN
Status: DISCONTINUED | OUTPATIENT
Start: 2020-08-17 | End: 2020-08-20

## 2020-08-17 RX ORDER — HEPARIN SODIUM 5000 [USP'U]/ML
5000 INJECTION, SOLUTION INTRAVENOUS; SUBCUTANEOUS EVERY 8 HOURS SCHEDULED
Status: DISCONTINUED | OUTPATIENT
Start: 2020-08-17 | End: 2020-08-18

## 2020-08-18 ENCOUNTER — ANCILLARY PROCEDURE (OUTPATIENT)
Dept: CARDIOLOGY | Age: 70
End: 2020-08-18
Attending: INTERNAL MEDICINE

## 2020-08-18 ENCOUNTER — TELEPHONE (OUTPATIENT)
Dept: CARDIOLOGY | Age: 70
End: 2020-08-18

## 2020-08-18 ENCOUNTER — APPOINTMENT (OUTPATIENT)
Dept: CV DIAGNOSTICS | Facility: HOSPITAL | Age: 70
End: 2020-08-18
Attending: INTERNAL MEDICINE
Payer: MEDICARE

## 2020-08-18 ENCOUNTER — APPOINTMENT (OUTPATIENT)
Dept: ULTRASOUND IMAGING | Facility: HOSPITAL | Age: 70
End: 2020-08-18
Attending: INTERNAL MEDICINE
Payer: MEDICARE

## 2020-08-18 DIAGNOSIS — Z95.810 ICD (IMPLANTABLE CARDIOVERTER-DEFIBRILLATOR) IN PLACE: ICD-10-CM

## 2020-08-18 PROBLEM — I48.0 PAROXYSMAL ATRIAL FIBRILLATION (HCC): Status: ACTIVE | Noted: 2020-08-18

## 2020-08-18 LAB
ANION GAP SERPL CALC-SCNC: 5 MMOL/L (ref 0–18)
ATRIAL RATE: 296 BPM
BUN BLD-MCNC: 82 MG/DL (ref 7–18)
BUN/CREAT SERPL: 25.1 (ref 10–20)
CALCIUM BLD-MCNC: 9.3 MG/DL (ref 8.5–10.1)
CHLORIDE SERPL-SCNC: 96 MMOL/L (ref 98–112)
CO2 SERPL-SCNC: 34 MMOL/L (ref 21–32)
CREAT BLD-MCNC: 3.27 MG/DL (ref 0.7–1.3)
EST. AVERAGE GLUCOSE BLD GHB EST-MCNC: 232 MG/DL (ref 68–126)
GLUCOSE BLD-MCNC: 197 MG/DL (ref 70–99)
GLUCOSE BLD-MCNC: 216 MG/DL (ref 70–99)
GLUCOSE BLD-MCNC: 247 MG/DL (ref 70–99)
GLUCOSE BLD-MCNC: 296 MG/DL (ref 70–99)
GLUCOSE BLD-MCNC: 389 MG/DL (ref 70–99)
GLUCOSE BLD-MCNC: 412 MG/DL (ref 70–99)
HBA1C MFR BLD HPLC: 9.7 % (ref ?–5.7)
OSMOLALITY SERPL CALC.SUM OF ELEC: 311 MOSM/KG (ref 275–295)
P AXIS: 242 DEGREES
POTASSIUM SERPL-SCNC: 3.8 MMOL/L (ref 3.5–5.1)
Q-T INTERVAL: 404 MS
QRS DURATION: 112 MS
QTC CALCULATION (BEZET): 448 MS
R AXIS: -74 DEGREES
SODIUM SERPL-SCNC: 135 MMOL/L (ref 136–145)
T AXIS: 172 DEGREES
TROPONIN I SERPL-MCNC: 0.13 NG/ML (ref ?–0.04)
URATE SERPL-MCNC: 9.1 MG/DL (ref 3.5–7.2)
VENTRICULAR RATE: 74 BPM

## 2020-08-18 PROCEDURE — 93306 TTE W/DOPPLER COMPLETE: CPT | Performed by: INTERNAL MEDICINE

## 2020-08-18 PROCEDURE — 99225 SUBSEQUENT OBSERVATION CARE: CPT | Performed by: HOSPITALIST

## 2020-08-18 PROCEDURE — 99215 OFFICE O/P EST HI 40 MIN: CPT | Performed by: INTERNAL MEDICINE

## 2020-08-18 PROCEDURE — 99214 OFFICE O/P EST MOD 30 MIN: CPT | Performed by: INTERNAL MEDICINE

## 2020-08-18 PROCEDURE — 76770 US EXAM ABDO BACK WALL COMP: CPT | Performed by: INTERNAL MEDICINE

## 2020-08-18 RX ORDER — PREDNISONE 50 MG/1
50 TABLET ORAL ONCE
Status: COMPLETED | OUTPATIENT
Start: 2020-08-18 | End: 2020-08-18

## 2020-08-18 RX ORDER — IRBESARTAN AND HYDROCHLOROTHIAZIDE 150; 12.5 MG/1; MG/1
1 TABLET, FILM COATED ORAL 2 TIMES DAILY
Status: DISCONTINUED | OUTPATIENT
Start: 2020-08-18 | End: 2020-08-18 | Stop reason: ALTCHOICE

## 2020-08-18 RX ORDER — ALBUTEROL SULFATE 90 UG/1
2 AEROSOL, METERED RESPIRATORY (INHALATION) EVERY 6 HOURS PRN
Status: DISCONTINUED | OUTPATIENT
Start: 2020-08-18 | End: 2020-08-20

## 2020-08-18 RX ORDER — PREDNISONE 20 MG/1
40 TABLET ORAL
Status: DISCONTINUED | OUTPATIENT
Start: 2020-08-19 | End: 2020-08-19

## 2020-08-18 RX ORDER — SODIUM CHLORIDE 9 MG/ML
INJECTION, SOLUTION INTRAVENOUS
Status: COMPLETED | OUTPATIENT
Start: 2020-08-19 | End: 2020-08-19

## 2020-08-18 RX ORDER — METOPROLOL SUCCINATE 50 MG/1
50 TABLET, EXTENDED RELEASE ORAL DAILY
Status: DISCONTINUED | OUTPATIENT
Start: 2020-08-18 | End: 2020-08-18

## 2020-08-18 RX ORDER — AMIODARONE HYDROCHLORIDE 200 MG/1
400 TABLET ORAL 2 TIMES DAILY WITH MEALS
Status: DISCONTINUED | OUTPATIENT
Start: 2020-08-18 | End: 2020-08-20

## 2020-08-18 RX ORDER — ASPIRIN 81 MG/1
81 TABLET, CHEWABLE ORAL DAILY
Status: DISCONTINUED | OUTPATIENT
Start: 2020-08-19 | End: 2020-08-20

## 2020-08-18 RX ORDER — METOPROLOL SUCCINATE 25 MG/1
25 TABLET, EXTENDED RELEASE ORAL
Status: DISCONTINUED | OUTPATIENT
Start: 2020-08-18 | End: 2020-08-20

## 2020-08-18 NOTE — PROGRESS NOTES
[facility]  Progress Note     Nati Dietz III Patient Status:  Observation    3/21/1950 MRN [id number]   [facility] 8NE-A Attending Mitch Brown [address] Day # 0 PCP None Pcp     Chief Complaint: Chest discomfort    S: 0.129*            Imaging: Imaging data reviewed in Epic.     Medications:   • apixaban  2.5 mg Oral BID   • [START ON 8/19/2020] aspirin  81 mg Oral Daily   • Metoprolol Succinate ER  25 mg Oral Daily Beta Blocker   • [START ON 8/19/2020] predniSONE  40 mg

## 2020-08-18 NOTE — ED PROVIDER NOTES
I reviewed that chart and discussed the case. I have examined the patient and noted lungs are clear cardiovascular exam shows regular rhythm abdomen soft nontender.       I agree with the following clinical impression(s):  Chest pain of uncertain etiology

## 2020-08-18 NOTE — H&P
[facility]  History and Physical     Teresa Mckinney III Patient Status:  Observation    3/21/1950 MRN [id number]   [facility] 8NE-A Attending Vel Méndez MD   Hosp Day # 0 PCP None Pcp     Chief Complaint: electrical sensation reimplantation of bypass grafts   • VALVE REPLACEMENT  2/2011    aortic- bioprosthetic       Social History:  reports that he has never smoked. He has never used smokeless tobacco. He reports current alcohol use. He reports that he does not use drugs. Rfl: 0  Albuterol Sulfate HFA (PROAIR HFA) 108 (90 Base) MCG/ACT Inhalation Aero Soln, Inhale 2 puffs into the lungs every 6 (six) hours as needed for Wheezing., Disp: , Rfl:   Acetaminophen-Codeine (TYLENOL WITH CODEINE #3) 300-30 MG Oral Tab, Take 1 tabl Protector , Disp: , Rfl:   NON FORMULARY, Hydrax , Disp: , Rfl:   Nutritional Supplements (ENSURE IMMUNE HEALTH OR), , Disp: , Rfl:   ACAI LEPE OR, , Disp: , Rfl:   B Complex Vitamins (B COMPLEX 1 OR), , Disp: , Rfl:   Calcium-Vitamins C & D (CALCIUM/C/D Rfl:   NON FORMULARY, Organic Kale Powder , Disp: , Rfl:   NON FORMULARY, Organic Tomato Juice Powder , Disp: , Rfl:   NON FORMULARY, Organic Wheat Grass Juice Powder , Disp: , Rfl:   NON FORMULARY, Phyto D 2000 , Disp: , Rfl:   NON FORMULARY, Phytocal O , SpO2 98%   BMI 29.35 kg/m²   General: No acute distress. Alert and oriented x 3. HEENT: Normocephalic atraumatic. Moist mucous membranes. EOM-I. PERRLA. Anicteric. Neck: No lymphadenopathy. No JVD. No carotid bruits.   Respiratory: Clear to auscultation b

## 2020-08-18 NOTE — PLAN OF CARE
Obtained pt from ER at 2300. Pt is a/o x4. Lungs are clear/diminished on room air. HR is v-paced on telemetry. Pacemaker was interrogated in ER. Pt complaining of BLE foot swelling and pain in toes. Slight, non pitting edema noted in feet.  Cardiology and N instruct to report SOB or any respiratory difficulty  - Respiratory Therapy support as indicated  - Manage/alleviate anxiety  - Monitor for signs/symptoms of CO2 retention  Outcome: Progressing     Problem: METABOLIC/FLUID AND ELECTROLYTES - ADULT  Goal: G

## 2020-08-18 NOTE — HISTORICAL OFFICE NOTE
Rosario Blood MD - 2020 11:45 AM CDT  Formatting of this note might be different from the original.  [address] Note    Colt Davey  : 3/21/1950  PCP: Wiley Winkler DO    Due to COVID19 action plan the patients off History   Problem Relation Age of Onset   • Coronary Artery Disease Neg Hx   Negative for premature CAD. • Aneurysm Neg Hx   Negative for AAA. Social Hx:  he reports that he has never smoked.  He has never used smokeless tobacco. He reports current al • SGPT (ALT) 11/08/2018 36 IU/L Final   • Bilirubin Total 11/08/2018 0.3 mg/dL Final   • Protein, Total 11/08/2018 7.3 g/dL Final   • BUN 11/08/2018 34 mg/dL Final   • Creatinine, Serum 11/08/2018 1.94 mg/dL Final   • Glucose 11/08/2018 251 mg/dL Final

## 2020-08-18 NOTE — CONSULTS
[facility]  Report of Consultation    Mathew Schultz III Patient Status:  Observation    3/21/1950 MRN [id number]   [facility] 8NE-A Attending Dimitry Zavala [address] Day # 0 PCP None Pcp       Assessment / Plan:    1) DARWIN / C 6/28/2017   • Diabetes ([facility] 75.)    • Essential hypertension    • Finger, superficial foreign body (splinter), without major open wound, infected    • Heart attack ([facility] 75.)    • Overweight(278.02)    • Personal history of other diseases of circulatory system    • testing    Medications:    Current Facility-Administered Medications:   •  Albuterol Sulfate  (90 Base) MCG/ACT inhaler 2 puff, 2 puff, Inhalation, Q6H PRN  •  apixaban (ELIQUIS) tab 2.5 mg, 2.5 mg, Oral, BID  •  [START ON 8/19/2020] aspirin chewabl kg)    General: awake alert  HEENT: No scleral icterus, MMM  Neck: Supple, no TIFFANIE or thyromegaly  Cardiac: Regular rate and rhythm, S1, S2 normal, no murmur, rub, or gallop  Lungs: Decreased breath sounds at the bases bilaterally.    Abdomen: Soft, non-tend

## 2020-08-18 NOTE — CONSULTS
[facility]  Cardiology Consultation    Martinez Emperor III Patient Status:  Observation    3/21/1950 MRN [id number]   [facility] 8NE-A Attending Moreno Begum MD   Hosp Day # 0 PCP None Pcp     Reason for Consultation:  Chest Pain / fa Attack Mother    • Thyroid Disorder Mother    • Cancer Paternal Grandfather    • Heart Disease Paternal Grandmother    • Alcohol and Other Disorders Associated Father    • Diabetes Father    • Substance Abuse Brother    • Heart Disease Maternal Grandfather Intravenous, Q6H PRN  •  Insulin Aspart Pen (NOVOLOG) 100 UNIT/ML flexpen 2-10 Units, 2-10 Units, Subcutaneous, TID CC and HS  •  Insulin Aspart Pen (NOVOLOG) 100 UNIT/ML flexpen 1-68 Units, 1-68 Units, Subcutaneous, TID CC    Review of Systems:  A compreh mean 20 mmHg. No     significant regurgitation. 4. Aorta: There is a prosthetic graft into the ascending aorta. 5. Left atrium: The left atrial volume was mildly increased. Coronary Angiogram (2018): Eron  1.   Three-vessel coronary artery disease  2

## 2020-08-18 NOTE — ED PROVIDER NOTES
Patient Seen in: [facility] Emergency Department      History   Patient presents with:  Chest Pain    Stated Complaint:     HPI  68 yo male with pacemaker and ICD presents for complaint of fatigue and feeling a shocking feeling around his ICD x4 day reviewed and negative except as noted above.     Physical Exam     ED Triage Vitals   BP 08/17/20 2010 112/84   Pulse 08/17/20 2010 82   Resp 08/17/20 2010 18   Temp 08/17/20 2007 98.7 °F (37.1 °C)   Temp src --    SpO2 08/17/20 2010 96 %   O2 Device 08/17/ Abnormality         Status                     ---------                               -----------         ------                     CBC W/ DIFFERENTIAL[[id number]]                              Final result                 Please view results for t [facility])  Azotemia    Disposition:  Admit  8/17/2020  8:48 pm    Follow-up:  No follow-up provider specified.         Medications Prescribed:  Current Discharge Medication List                       Present on Admission  Date Reviewed: 12/18/2018          ICD-

## 2020-08-18 NOTE — ICD/PM
Remote Cardiac Device Interrogation        Device : [employer]    Device type: biventricular ICD    Battery longevity: 7 year(s)    Current rhythm: a sensed, v paced    Lead impedance: normal  Sensing: normal  Auto thresholds: normal

## 2020-08-18 NOTE — ED INITIAL ASSESSMENT (HPI)
Pt reports fatigue for the last two days with an intermittent \"shocking\" pain around his ICD. Denies pain at this time. Denies sob.

## 2020-08-18 NOTE — PLAN OF CARE
Received patient at 0730. Alert and orientated x4. Tele Rhythm V-paced. O2 saturation 98% on RA. Breath sounds clear/diminished. No C/O chest pain, dizziness, or SOB. Pt voiding with no issues. Pt tolerating ambulation. Skin is dry and intact.  Echo this AM Cessation handout, if applicable  - Encourage broncho-pulmonary hygiene including cough, deep breathe, Incentive Spirometry  - Assess the need for suctioning and perform as needed  - Assess and instruct to report SOB or any respiratory difficulty  - Respir

## 2020-08-18 NOTE — CONSULTS
[facility]  Report of Consultation    Yulissa Abbasi III Patient Status:  Observation    3/21/1950 MRN [id number]   [facility] 8NE-A Attending Willis Grief [address] Day # 0 PCP None Pcp     Andreina Salgado Grandfather    • Heart Disease Maternal Grandmother    • Heart Disease Sister    • Hypertension Brother    • Hypertension Sister    • Thyroid Disorder Paternal Aunt    • Diabetes Brother    • DVT/VTE Neg       reports that he has never smoked.  He has never Systems:  All systems were reviewed and are negative except as described above in HPI. Physical Exam:  Blood pressure 114/74, pulse 73, temperature 98.2 °F (36.8 °C), temperature source Oral, resp. rate 16, height 67\", weight 187 lb 6.3 oz, SpO2 98 %.

## 2020-08-19 ENCOUNTER — APPOINTMENT (OUTPATIENT)
Dept: CV DIAGNOSTICS | Facility: HOSPITAL | Age: 70
End: 2020-08-19
Attending: INTERNAL MEDICINE
Payer: MEDICARE

## 2020-08-19 LAB
ANION GAP SERPL CALC-SCNC: 2 MMOL/L (ref 0–18)
ATRIAL RATE: 62 BPM
BUN BLD-MCNC: 52 MG/DL (ref 7–18)
BUN/CREAT SERPL: 22.6 (ref 10–20)
CALCIUM BLD-MCNC: 9 MG/DL (ref 8.5–10.1)
CHLORIDE SERPL-SCNC: 104 MMOL/L (ref 98–112)
CO2 SERPL-SCNC: 32 MMOL/L (ref 21–32)
CREAT BLD-MCNC: 2.3 MG/DL (ref 0.7–1.3)
GLUCOSE BLD-MCNC: 140 MG/DL (ref 70–99)
GLUCOSE BLD-MCNC: 148 MG/DL (ref 70–99)
GLUCOSE BLD-MCNC: 151 MG/DL (ref 70–99)
GLUCOSE BLD-MCNC: 193 MG/DL (ref 70–99)
GLUCOSE BLD-MCNC: 244 MG/DL (ref 70–99)
OSMOLALITY SERPL CALC.SUM OF ELEC: 303 MOSM/KG (ref 275–295)
POTASSIUM SERPL-SCNC: 3.9 MMOL/L (ref 3.5–5.1)
Q-T INTERVAL: 532 MS
QRS DURATION: 148 MS
QTC CALCULATION (BEZET): 532 MS
R AXIS: 234 DEGREES
SODIUM SERPL-SCNC: 138 MMOL/L (ref 136–145)
T AXIS: 42 DEGREES
VENTRICULAR RATE: 60 BPM

## 2020-08-19 PROCEDURE — 99204 OFFICE O/P NEW MOD 45 MIN: CPT | Performed by: CLINICAL NURSE SPECIALIST

## 2020-08-19 PROCEDURE — B24BZZ4 ULTRASONOGRAPHY OF HEART WITH AORTA, TRANSESOPHAGEAL: ICD-10-PCS | Performed by: INTERNAL MEDICINE

## 2020-08-19 PROCEDURE — 99213 OFFICE O/P EST LOW 20 MIN: CPT | Performed by: INTERNAL MEDICINE

## 2020-08-19 PROCEDURE — 76376 3D RENDER W/INTRP POSTPROCES: CPT | Performed by: INTERNAL MEDICINE

## 2020-08-19 PROCEDURE — 99152 MOD SED SAME PHYS/QHP 5/>YRS: CPT | Performed by: INTERNAL MEDICINE

## 2020-08-19 PROCEDURE — 93320 DOPPLER ECHO COMPLETE: CPT | Performed by: INTERNAL MEDICINE

## 2020-08-19 PROCEDURE — 93325 DOPPLER ECHO COLOR FLOW MAPG: CPT | Performed by: INTERNAL MEDICINE

## 2020-08-19 PROCEDURE — 93312 ECHO TRANSESOPHAGEAL: CPT | Performed by: INTERNAL MEDICINE

## 2020-08-19 PROCEDURE — 99226 SUBSEQUENT OBSERVATION CARE: CPT | Performed by: HOSPITALIST

## 2020-08-19 RX ORDER — MIDAZOLAM HYDROCHLORIDE 1 MG/ML
INJECTION INTRAMUSCULAR; INTRAVENOUS
Status: COMPLETED
Start: 2020-08-19 | End: 2020-08-19

## 2020-08-19 NOTE — PROCEDURES
Cardiology 3D Transesophageal Echo Note    PRE-PROCEDURE DIAGNOSIS: History of aortic valve replacement with bioprosthetic valve    PROCEDURE: Transesophageal Echocardiogram.    SEDATION:   Topical spray x 1  Versed: 3 mg  Fentanyl: 75 mcg  I personally grady but an agitated saline study and color Doppler flow interrogation of the intra-atrial septum was performed and there was no intracardiac shunting to suggest an ASD or PFO.   · There was mild scattered atherosclerotic plaque in the visualized aorta without e

## 2020-08-19 NOTE — CONSULTS
[facility]  Diabetes Consult Note    Nati Brink III Patient Status:  Observation    3/21/1950 MRN [id number]   [facility] 8NE-A Attending Anaya Underwood MD   Hosp Day # 0 PCP None Pcp     Reason for Consult:     Recommendations f 6/28/2017   • Diabetes ([facility]    • Essential hypertension    • Finger, superficial foreign body (splinter), without major open wound, infected    • Heart attack ([facility] 75.)    • Overweight(278.02)    • Personal history of other diseases of circulatory system    • Present Illness: Van Kaufman is a 69year old male with uncontrolled type 2 diabetes admitted 8/17/2020 for fatigue, edema and an \"electricial sensation around his ICD. \"      Assessment/Recommendations:    Patient reports he was diagnosed with typ recommended basal/bolus insulin for home. Explained calculating I:C/CF and he verbalized understanding, but will need reinforcement. He states he is willing to take insulin as recommended.   Spoke with Dr. Mesha Desir and recommended endocrinologist consult Getting Started Take Home Kit\"  · Patient to administer subcutaneously insulin injection with insulin pen under nursing supervision once return demonstration has verified patient's skill  · RN to teach survival skills:  · Provide \"Understanding Diabetes:

## 2020-08-19 NOTE — PLAN OF CARE
Pt is A&Ox4. RA, lungs diminished. Vpaced on tele, denies cardiovascular symptoms. HR 60s. Continent B&B, last BM 8/18. NPO for LUCY tomorrow. Consent signed. QID accucheck 296 tonight. Up ad brinda. IVF infusing 70cc/hr. Fall precautions in place.  Pt refused Goal: \"d/c\"    Interventions:  - LUCY  - See additional Care Plan goals for specific interventions   Outcome: Progressing  Goal: Patient/Family Short Term Goal  Description  Patient's Short Term Goal: \"no pain\"    Interventions:   - pain med prn  - See ordered  - Initiate emergency measures for life threatening arrhythmias  - Monitor electrolytes and administer replacement therapy as ordered  Outcome: Progressing

## 2020-08-19 NOTE — PROGRESS NOTES
[facility]  Progress Note     Ralph Lee III Patient Status:  Observation    3/21/1950 MRN [id number]   [facility] 8NE-A Attending Lc Live [address] Day # 0 PCP None Pcp     Chief Complaint: Chest discomfort    S: aspirin  81 mg Oral Daily   • Metoprolol Succinate ER  25 mg Oral Daily Beta Blocker   • predniSONE  40 mg Oral Daily with breakfast   • amiodarone HCl  400 mg Oral BID with meals   • sodium chloride   Intravenous On Call   • benzocaine  1 spray Mouth/Thro

## 2020-08-19 NOTE — DISCHARGE SUMMARY
[facility]  DISCHARGE SUMMARY     Colt Davey III Patient Status:  Observation    3/21/1950 MRN [id number]   [facility] 8NE-A Attending Richard Espinal MD   Hosp Day # 0 PCP None Pcp     Date of Admission: 2020  Date of Disc findings and recommendations (brief descriptions):  • none    Lab/Test results pending at Discharge:   · none    Consultants:  • Renal  • cards    Discharge Medication List:     Discharge Medications      START taking these medications      Instructions Pr FreeStyle Lite Test      1 Each by Other route daily.  Test 6 times daily   Quantity:  100 Each  Refills:  1     GNP CORAL CALCIUM OR       Refills:  0     GRAPE SEED CR OR       Refills:  0     hydrocortisone 2.5 % Oint      Apply 1 Application topically 2 GTF Chromium Glucose Tolerant Factor   Refills:  0     NON FORMULARY      Isontonix Maximum Orac   Refills:  0     NON FORMULARY      Phyto D 2000   Refills:  0     NON FORMULARY      Nature's Pure Superfood   Refills:  0     NON FORMULARY      Orgain Orga FORMULARY      Protein O   Refills:  0     NON FORMULARY      Super Pure Beta 1,3 Glucan   Refills:  0     NON FORMULARY      FitBit Superfood blend   Refills:  0     OLIVE LEAF EXTRACT OR       Refills:  0     ONETOUCH LANCETS      Tests weekly/[facility]  [phone]  [phone]  In 1 week  establish pcp and f/u 1 week    Mike Aldana MD  [address] [place] [address]  [address]    In 2 weeks      Carolyn Red,

## 2020-08-19 NOTE — PROGRESS NOTES
[facility]  Nephrology Progress Note    Karen Francisco III Attending:   Cristal Alicea MD       Assessment and Plan:    1) DARWIN / CKD 4- baseline creatinine previously less than 2 mg/dL prior 2018 x 10 yrs presumably due to longstanding hypertension ag Abdomen: Soft, non-tender. + bowel sounds, no palpable organomegaly  Extremities: Without clubbing, cyanosis; no edema  Neurologic: Cranial nerves grossly intact, moving all extremities  Skin: Warm and dry, no rashes       Labs:   Lab Results   Component

## 2020-08-20 VITALS
RESPIRATION RATE: 18 BRPM | OXYGEN SATURATION: 100 % | DIASTOLIC BLOOD PRESSURE: 74 MMHG | BODY MASS INDEX: 29.72 KG/M2 | HEART RATE: 65 BPM | HEIGHT: 67 IN | SYSTOLIC BLOOD PRESSURE: 129 MMHG | TEMPERATURE: 98 F | WEIGHT: 189.38 LBS

## 2020-08-20 PROBLEM — E11.65 UNCONTROLLED TYPE 2 DIABETES MELLITUS WITH HYPERGLYCEMIA, WITHOUT LONG-TERM CURRENT USE OF INSULIN (HCC): Chronic | Status: ACTIVE | Noted: 2020-08-20

## 2020-08-20 LAB
ANION GAP SERPL CALC-SCNC: 3 MMOL/L (ref 0–18)
ATRIAL RATE: 60 BPM
BUN BLD-MCNC: 48 MG/DL (ref 7–18)
BUN/CREAT SERPL: 21.2 (ref 10–20)
CALCIUM BLD-MCNC: 9.1 MG/DL (ref 8.5–10.1)
CHLORIDE SERPL-SCNC: 104 MMOL/L (ref 98–112)
CHOLEST SMN-MCNC: 167 MG/DL (ref ?–200)
CO2 SERPL-SCNC: 31 MMOL/L (ref 21–32)
CREAT BLD-MCNC: 2.26 MG/DL (ref 0.7–1.3)
GLUCOSE BLD-MCNC: 154 MG/DL (ref 70–99)
GLUCOSE BLD-MCNC: 167 MG/DL (ref 70–99)
GLUCOSE BLD-MCNC: 169 MG/DL (ref 70–99)
GLUCOSE BLD-MCNC: 190 MG/DL (ref 70–99)
HDLC SERPL-MCNC: 35 MG/DL (ref 40–59)
KAPPA FREE LIGHT CHAIN: 16.55 MG/DL (ref 0.33–1.94)
KAPPA/LAMBDA FLC RATIO: 9.58 (ref 0.26–1.65)
LAMBDA FREE LIGHT CHAIN: 1.73 MG/DL (ref 0.57–2.63)
LDLC SERPL CALC-MCNC: 88 MG/DL (ref ?–100)
NONHDLC SERPL-MCNC: 132 MG/DL (ref ?–130)
OSMOLALITY SERPL CALC.SUM OF ELEC: 302 MOSM/KG (ref 275–295)
P-R INTERVAL: 196 MS
POTASSIUM SERPL-SCNC: 3.7 MMOL/L (ref 3.5–5.1)
Q-T INTERVAL: 528 MS
QRS DURATION: 148 MS
QTC CALCULATION (BEZET): 528 MS
R AXIS: 249 DEGREES
SODIUM SERPL-SCNC: 138 MMOL/L (ref 136–145)
T AXIS: 47 DEGREES
TRIGL SERPL-MCNC: 221 MG/DL (ref 30–149)
TSI SER-ACNC: 3.38 MIU/ML (ref 0.36–3.74)
VENTRICULAR RATE: 60 BPM
VLDLC SERPL CALC-MCNC: 44 MG/DL (ref 0–30)

## 2020-08-20 PROCEDURE — 99214 OFFICE O/P EST MOD 30 MIN: CPT | Performed by: CLINICAL NURSE SPECIALIST

## 2020-08-20 PROCEDURE — 99214 OFFICE O/P EST MOD 30 MIN: CPT | Performed by: NURSE PRACTITIONER

## 2020-08-20 PROCEDURE — 99213 OFFICE O/P EST LOW 20 MIN: CPT | Performed by: INTERNAL MEDICINE

## 2020-08-20 PROCEDURE — 99217 OBSERVATION CARE DISCHARGE: CPT | Performed by: HOSPITALIST

## 2020-08-20 RX ORDER — METOPROLOL SUCCINATE 25 MG/1
25 TABLET, EXTENDED RELEASE ORAL DAILY
Status: ON HOLD | COMMUNITY
End: 2021-07-15

## 2020-08-20 RX ORDER — INSULIN LISPRO 100 [IU]/ML
INJECTION, SOLUTION INTRAVENOUS; SUBCUTANEOUS
Qty: 5 PEN | Refills: 0 | Status: ON HOLD | OUTPATIENT
Start: 2020-08-20 | End: 2021-07-16

## 2020-08-20 RX ORDER — AMIODARONE HYDROCHLORIDE 200 MG/1
400 TABLET ORAL DAILY
Qty: 30 TABLET | Refills: 5 | Status: SHIPPED | OUTPATIENT
Start: 2020-08-20 | End: 2020-08-20

## 2020-08-20 RX ORDER — INSULIN GLARGINE 100 [IU]/ML
INJECTION, SOLUTION SUBCUTANEOUS
Qty: 5 PEN | Refills: 0 | Status: ON HOLD | OUTPATIENT
Start: 2020-08-20 | End: 2021-07-16

## 2020-08-20 RX ORDER — IRBESARTAN 300 MG/1
300 TABLET ORAL NIGHTLY
Qty: 30 TABLET | Refills: 11 | Status: SHIPPED | OUTPATIENT
Start: 2020-08-20

## 2020-08-20 RX ORDER — POTASSIUM CHLORIDE 20 MEQ/1
20 TABLET, EXTENDED RELEASE ORAL ONCE
Status: COMPLETED | OUTPATIENT
Start: 2020-08-20 | End: 2020-08-20

## 2020-08-20 RX ORDER — AMIODARONE HYDROCHLORIDE 200 MG/1
200 TABLET ORAL DAILY
Qty: 30 TABLET | Refills: 5 | Status: SHIPPED | OUTPATIENT
Start: 2020-08-20

## 2020-08-20 RX ORDER — IRBESARTAN AND HYDROCHLOROTHIAZIDE 150; 12.5 MG/1; MG/1
1 TABLET, FILM COATED ORAL 2 TIMES DAILY
Status: ON HOLD | COMMUNITY
End: 2020-08-20

## 2020-08-20 NOTE — PLAN OF CARE
Received patient at 0730. Alert and orientated x4. Tele Rhythm V-paced. O2 saturation 96% on RA. Breath sounds clear/diminished. No C/O chest pain, dizziness, or SOB. Pt voiding with no issues. Pt ambulated in hallway w/o issues. Skin is dry and intact.  NS handout, if applicable  - Encourage broncho-pulmonary hygiene including cough, deep breathe, Incentive Spirometry  - Assess the need for suctioning and perform as needed  - Assess and instruct to report SOB or any respiratory difficulty  - Respiratory Ther Long Term Goal  Description  Patient's Long Term Goal: \"d/c\"    Interventions:  - LUCY  - See additional Care Plan goals for specific interventions   Outcome: Progressing  Goal: Patient/Family Short Term Goal  Description  Patient's Short Term Goal: \"no

## 2020-08-20 NOTE — PROGRESS NOTES
[facility]  Nephrology Progress Note    Baldev Rodriguez III Attending:   Dallas Vazquez MD       Assessment and Plan:    1) DARWIN / CKD 4- baseline creatinine approx 2 mg/dd presumably due to longstanding hypertension + age-related nephrosclerosis; also organomegaly  Extremities: Without clubbing, cyanosis; no edema  Neurologic: Cranial nerves grossly intact, moving all extremities  Skin: Warm and dry, no rashes       Labs:   Lab Results   Component Value Date    CREATSERUM 2.26 08/20/2020    BUN 48 08/20

## 2020-08-20 NOTE — PROGRESS NOTES
[facility]  Cardiology Progress Note    Kya Pennant III Patient Status:  Observation    3/21/1950 MRN [id number]   [facility] 8NE-A Attending Savage Garza MD   Hosp Day # 0 PCP None Pcp     Subjective:  No complaints of chest lupillo Assessment:  · Atrial fibrillation, converted to SR on amiodarone  · Hx of CABG, Bioprosthetic AVR, s/p LUCY with normal valve function  · CRD ICD - interrogation revealed AF burden < 1%  · History or PE with lytics, on eliquis  · Acute on chronic k

## 2020-08-20 NOTE — CM/SW NOTE
Per RN, pt will require a ride home. Ride arranged through [employer] for [facility]. SW spoke to [employer] at [employer]. Provided discharge address in [address] [facility]. Pt will discharge via cab arranged through [employer]. Reservation #: [id number].  Inf

## 2020-08-20 NOTE — PROGRESS NOTES
[facility]  Progress Note     Martinez Emperor III Patient Status:  Observation    3/21/1950 MRN [id number]   [facility] 8NE-A Attending Yuniel Marshall [address] Day # 0 PCP None Pcp     Chief Complaint: Chest discomfort  Subj hours. Recent Labs   Lab 08/17/20 2008 08/18/20  0620   TROP 0.093* 0.129*            Imaging: Imaging data reviewed in Epic.     Medications:   • insulin detemir  8 Units Subcutaneous Nightly   • apixaban  2.5 mg Oral BID   • aspirin  81 mg Oral Daily

## 2020-08-20 NOTE — PLAN OF CARE
Patient is A&Ox4  Denies any pain or discomfort  No n/v/d  Afebrile, VSS  V-paced on tele; on room air  Up ad brinda  Daily weights  Accucheck QID  Performed insulin administration with this writer   Patient had 14 beats of vtach - asymptomatic, no chest pain instruct to report SOB or any respiratory difficulty  - Respiratory Therapy support as indicated  - Manage/alleviate anxiety  - Monitor for signs/symptoms of CO2 retention  Outcome: Progressing     Problem: METABOLIC/FLUID AND ELECTROLYTES - ADULT  Goal: G Patient/Family Short Term Goal  Description  Patient's Short Term Goal: \"no pain\"    Interventions:   - pain med prn  - See additional Care Plan goals for specific interventions   Outcome: Progressing

## 2020-08-20 NOTE — PROGRESS NOTES
[facility]  Diabetes Follow Up      Clyde Barrios III  [id number]       Patient seen and evaluated; states he is feeling well and ready to go home.     Recent Labs   Lab 08/19/20  1448 08/19/20  1818 08/19/20  2058 08/20/20  0656 08/20/20  [facility]    PROVIDER F/U RECOMMENDATIONS:    · Patient's current PCP  · Endocrinologist    A total of 25 minutes were spent with the patient, 100% was spent counseling and coordinating care for uncontrolled type 2 diabetes self-management including

## 2020-08-20 NOTE — CONSULTS
ENDOCRINOLOGY CONSULTATION    Attending physician:  Naomi Lane MD  Consulting physican:  Darnell Smallwood MD    Admission Date:  8/17/2020  Consultation Date:  8/20/2020      Reason for consultation: Mgmt of Type 2 DM    Chief Complaint:  Cheryal [place] Personal history of urinary calculi    • Pulmonary embolism (HCC)    • Screening for iron deficiency anemia    • Screening for other and unspecified endocrine, nutritional, metabolic, and immunity disorders    • Screening for other and unspecified genitour Minerals-Vitamins (BONE DENSITY BUILDER OR), , Disp: , Rfl:   · NON FORMULARY, Cardio For Life - Health Guardian , Disp: , Rfl:   · NON FORMULARY, Cardio FX - People's  , Disp: , Rfl:   · NON FORMULARY, Cell [address] , Disp: , Rfl Take 1 packet by mouth daily. , Disp: , Rfl:   · NON FORMULARY, Isotonix OPC-3 Oliogomeric Proanthocyanidins , Disp: , Rfl:   · RESVERATROL OR, , Disp: , Rfl:   · NON FORMULARY, Isotonix Ultimate Longevity formula , Disp: , Rfl:   · NON FORMULARY, MacroLife · NON FORMULARY, Protein O , Disp: , Rfl:   · Omega-3 Fatty Acids (OMEGA-3 CF OR), , Disp: , Rfl:   · NON FORMULARY, Pure Energy Superfood , Disp: , Rfl:   · NON FORMULARY, Pure Radiance C , Disp: , Rfl:   · QUERCETIN OR, , Disp: , Rfl:   · Red Yeast Girma Intravenous, On Call  · benzocaine (HURRICAINE/TOPEX) 20 % mouth spray 1 spray, 1 spray, Mouth/Throat, Once  · Insulin Aspart Pen (NOVOLOG) 100 UNIT/ML flexpen 1-10 Units, 1-10 Units, Subcutaneous, TID CC and HS  · [COMPLETED] insulin aspart (NOVOLOG) 100 can a daily        Exercise: Yes          5 days a week     Social History Narrative      Lives with girlfriend. 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 Pt can be discharged from endocrine standpoint.  I recommend the following:      Do not take metformin     Basaglar Karie Jovel) - take 8 Units at bedtime (9-10 pm)    Humalog (Kwikpen) - take 1 Unit for every 10 grams of carbs PLUS     Take 1

## 2020-08-20 NOTE — DIETARY NOTE
[address] [facility] III     Admitting diagnosis:  Hypokalemia [E87.6]  Hyponatremia [E87.1]  Hyperglycemia [R73.9]  Azotemia [R79.89]  Acute kidney injury ([address] [N17.9]  Chest pain of uncertain etiology [G46.95]    Ht: 17

## 2020-08-20 NOTE — PROGRESS NOTES
Waiting for pt's cab to arrive called [employer] ride was cancelled per service.   Reordered verification # [id number]

## 2020-08-21 ENCOUNTER — PATIENT OUTREACH (OUTPATIENT)
Dept: CASE MANAGEMENT | Age: 70
End: 2020-08-21

## 2020-08-21 ENCOUNTER — E-ADVICE (OUTPATIENT)
Dept: CARDIOLOGY | Age: 70
End: 2020-08-21

## 2020-08-21 DIAGNOSIS — Z02.9 ENCOUNTERS FOR UNSPECIFIED ADMINISTRATIVE PURPOSE: ICD-10-CM

## 2020-08-21 DIAGNOSIS — R07.9 CHEST PAIN OF UNCERTAIN ETIOLOGY: ICD-10-CM

## 2020-08-21 LAB
ALBUMIN SERPL ELPH-MCNC: 4.8 G/DL (ref 3.75–5.21)
ALBUMIN/GLOB SERPL: 1.65 {RATIO} (ref 1–2)
ALPHA1 GLOB SERPL ELPH-MCNC: 0.28 G/DL (ref 0.19–0.46)
ALPHA2 GLOB SERPL ELPH-MCNC: 1 G/DL (ref 0.48–1.05)
B-GLOBULIN SERPL ELPH-MCNC: 0.87 G/DL (ref 0.68–1.23)
GAMMA GLOB SERPL ELPH-MCNC: 0.75 G/DL (ref 0.62–1.7)
M PROTEIN MFR SERPL ELPH: 7.7 G/DL (ref 6.4–8.2)

## 2020-08-21 PROCEDURE — 1111F DSCHRG MED/CURRENT MED MERGE: CPT

## 2020-08-21 NOTE — PROGRESS NOTES
Initial Post Discharge Follow Up   Discharge Date: 8/20/20  Contact Date: 8/21/2020    Consent Verification:  Assessment Completed With: Patient  HIPAA Verified?   Yes    Discharge Dx:  Atrial fibrillation- amiodarone started per cards  DM 2 with hypergl 5 pen 0   • HUMALOG KWIKPEN 100 UNIT/ML Subcutaneous Solution Pen-injector Take as directed - max 30 Units per day 5 pen 0   • Insulin Pen Needle (BD PEN NEEDLE JADEN U/F) 32G X 4 MM Does not apply Misc For insulin use 4 times per day 200 each 0   • hydroco • Nutritional Supplements (ENSURE IMMUNE HEALTH OR)      • ACAI LEPE OR      • B Complex Vitamins (B COMPLEX 1 OR)      • Calcium-Vitamins C & D (CALCIUM/C/D OR)      • COENZYME Q10 OR      • NON FORMULARY Isotonix Immune      • Misc Natural Products (J FORMULARY Protein O      • Omega-3 Fatty Acids (OMEGA-3 CF OR)      • NON FORMULARY Pure Energy Superfood      • NON FORMULARY Pure Radiance C      • QUERCETIN OR      • Red Yeast Rice Extract (RED YEAST RICE OR)      • NON FORMULARY Relief FX      • SELEN appointments:      Your appointments     Date & Time Appointment Department [facility])    Sep 03, 2020  1:40 PM CDT Follow Up Visit with Earlean Bamberger,  E Ethel Ross ([address] [place] [address] 1330, Jennifer Berg)        Sep 03, [place]  3:00 PM changes or updates to medications and or orders sent to PCP.      For patients with TCC appointments:     NCM offered sooner TCC appointment if schedule allowed:  yes    [x]  Advised patient to bring all medications and blood glucose meter/supplies if appli

## 2020-08-27 NOTE — PROGRESS NOTES
[facility]  Nephrology Progress Note    Leny Menendez III Patient Status:  Inpatient    3/21/1950 MRN [id number]   [facility] 8NE-A Attending Ketan Burns MD   [facility] Day # 3 PCP Zoë Figueroa DO       SUBJECTIVE:  Had hallucinations o 72 hours.     Invalid input(s): ALPHOS, TBIL, DBIL, TPROT    Recent Labs   Lab  03/31/17   0730  03/31/17   1213  03/31/17   1724  03/31/17   2243  04/01/17   0721   PGLU  144*  120*  109*  149*  107*       Meds:     Current Facility-Administered Medication pulm embolus-  S/p angio with directed lytics. On heparin with plan to transition to eliquis per cards/heme    #3.  HTN- BP stable    Will follow peripherally, please call with questions    Questions/concerns were discussed with patient and/or family by be N/A

## 2020-08-31 ENCOUNTER — E-ADVICE (OUTPATIENT)
Dept: CARDIOLOGY | Age: 70
End: 2020-08-31

## 2020-09-01 RX ORDER — AMIODARONE HYDROCHLORIDE 200 MG/1
200 TABLET ORAL DAILY
Qty: 30 TABLET | Refills: 1 | Status: SHIPPED | OUTPATIENT
Start: 2020-09-01 | End: 2020-10-21 | Stop reason: SDUPTHER

## 2020-09-01 RX ORDER — AMIODARONE HYDROCHLORIDE 200 MG/1
400 TABLET ORAL DAILY
COMMUNITY
Start: 2020-08-20 | End: 2020-09-01 | Stop reason: SDUPTHER

## 2020-09-03 PROBLEM — E78.49 OTHER HYPERLIPIDEMIA: Status: ACTIVE | Noted: 2020-09-03

## 2020-09-09 ENCOUNTER — E-ADVICE (OUTPATIENT)
Dept: CARDIOLOGY | Age: 70
End: 2020-09-09

## 2020-09-10 ENCOUNTER — TELEPHONE (OUTPATIENT)
Dept: CARDIOLOGY | Age: 70
End: 2020-09-10

## 2020-09-10 RX ORDER — IRBESARTAN 300 MG/1
300 TABLET ORAL NIGHTLY
COMMUNITY
End: 2020-12-16

## 2020-09-11 ENCOUNTER — TELEPHONE (OUTPATIENT)
Dept: CARDIOLOGY | Age: 70
End: 2020-09-11

## 2020-09-17 RX ORDER — IRBESARTAN 300 MG/1
300 TABLET ORAL NIGHTLY
Qty: 30 TABLET | Refills: 11 | OUTPATIENT
Start: 2020-09-17

## 2020-10-07 ENCOUNTER — E-ADVICE (OUTPATIENT)
Dept: CARDIOLOGY | Age: 70
End: 2020-10-07

## 2020-10-21 ENCOUNTER — TELEPHONE (OUTPATIENT)
Dept: CARDIOLOGY | Age: 70
End: 2020-10-21

## 2020-10-21 RX ORDER — AMIODARONE HYDROCHLORIDE 200 MG/1
200 TABLET ORAL DAILY
Qty: 90 TABLET | Refills: 0 | Status: SHIPPED | OUTPATIENT
Start: 2020-10-21 | End: 2021-01-19

## 2020-11-16 ENCOUNTER — APPOINTMENT (OUTPATIENT)
Dept: CARDIOLOGY | Age: 70
End: 2020-11-16

## 2020-11-20 ENCOUNTER — HOSPITAL ENCOUNTER (EMERGENCY)
Facility: HOSPITAL | Age: 70
Discharge: HOME | End: 2020-11-20
Attending: EMERGENCY MEDICINE
Payer: MEDICARE

## 2020-11-20 VITALS
RESPIRATION RATE: 16 BRPM | HEART RATE: 82 BPM | WEIGHT: 192 LBS | DIASTOLIC BLOOD PRESSURE: 90 MMHG | BODY MASS INDEX: 30.13 KG/M2 | OXYGEN SATURATION: 98 % | TEMPERATURE: 97 F | SYSTOLIC BLOOD PRESSURE: 129 MMHG | HEIGHT: 67 IN

## 2020-11-20 DIAGNOSIS — H81.399 PERIPHERAL VERTIGO, UNSPECIFIED LATERALITY: Primary | ICD-10-CM

## 2020-11-20 PROCEDURE — 93010 ELECTROCARDIOGRAM REPORT: CPT | Performed by: EMERGENCY MEDICINE

## 2020-11-20 PROCEDURE — 99284 EMERGENCY DEPT VISIT MOD MDM: CPT

## 2020-11-20 PROCEDURE — 96374 THER/PROPH/DIAG INJ IV PUSH: CPT

## 2020-11-20 PROCEDURE — 80048 BASIC METABOLIC PNL TOTAL CA: CPT | Performed by: EMERGENCY MEDICINE

## 2020-11-20 PROCEDURE — 85025 COMPLETE CBC W/AUTO DIFF WBC: CPT | Performed by: EMERGENCY MEDICINE

## 2020-11-20 PROCEDURE — 84484 ASSAY OF TROPONIN QUANT: CPT | Performed by: EMERGENCY MEDICINE

## 2020-11-20 PROCEDURE — 82962 GLUCOSE BLOOD TEST: CPT

## 2020-11-20 PROCEDURE — 93005 ELECTROCARDIOGRAM TRACING: CPT

## 2020-11-20 PROCEDURE — 96375 TX/PRO/DX INJ NEW DRUG ADDON: CPT

## 2020-11-20 RX ORDER — MECLIZINE HYDROCHLORIDE 25 MG/1
25 TABLET ORAL ONCE
Status: COMPLETED | OUTPATIENT
Start: 2020-11-20 | End: 2020-11-20

## 2020-11-20 RX ORDER — METOCLOPRAMIDE HYDROCHLORIDE 5 MG/ML
5 INJECTION INTRAMUSCULAR; INTRAVENOUS ONCE
Status: COMPLETED | OUTPATIENT
Start: 2020-11-20 | End: 2020-11-20

## 2020-11-20 RX ORDER — MECLIZINE HYDROCHLORIDE 25 MG/1
25 TABLET ORAL 3 TIMES DAILY PRN
Qty: 20 TABLET | Refills: 0 | Status: ON HOLD | OUTPATIENT
Start: 2020-11-20 | End: 2021-07-14

## 2020-11-20 RX ORDER — ONDANSETRON 2 MG/ML
4 INJECTION INTRAMUSCULAR; INTRAVENOUS ONCE
Status: COMPLETED | OUTPATIENT
Start: 2020-11-20 | End: 2020-11-20

## 2020-11-20 RX ORDER — METOCLOPRAMIDE 10 MG/1
10 TABLET ORAL 3 TIMES DAILY PRN
Qty: 20 TABLET | Refills: 0 | Status: SHIPPED | OUTPATIENT
Start: 2020-11-20 | End: 2020-12-20

## 2020-11-20 NOTE — ED PROVIDER NOTES
Patient Seen in: [facility] AND [facility] Emergency Department      History   Patient presents with:  Dizziness    Stated Complaint:     HPI    13-year-old male with past medical history significant for CAD, stage IV chronic kidney disease, diabetes, high blood HISTORY  2/2011    reimplantation of bypass grafts   • VALVE REPLACEMENT  2/2011    aortic- bioprosthetic                    Social History    Tobacco Use      Smoking status: Never Smoker      Smokeless tobacco: Never Used    Alcohol use: Yes      Comment All other components within normal limits   POCT GLUCOSE - Abnormal; Notable for the following components:    POC Glucose  176 (*)     All other components within normal limits   TROPONIN I - Normal   CBC WITH DIFFERENTIAL WITH PLATELET    Narrative:     T MG Oral Tab  Take 1 tablet (25 mg total) by mouth 3 (three) times daily as needed for Dizziness. Qty: 20 tablet Refills: 0    Metoclopramide HCl 10 MG Oral Tab  Take 1 tablet (10 mg total) by mouth 3 (three) times daily as needed.   Qty: 20 tablet Refills:

## 2020-11-20 NOTE — ED INITIAL ASSESSMENT (HPI)
Pt reports taking Lyrica last night at 1900 for the first time ever, pt reports dizziness, nausea, lightheadedness that started at 0300. C/o vomiting and excessive sleeping.

## 2020-11-20 NOTE — ED NOTES
Pt presents via EMS for c/o dizziness, lightheadedness, nausea, vomiting since early this AM. Pt reports new medication last night of lyrica. Pt reports dizziness worse with movement. Pt denies CP/SOB, recent cough or fever.  Pt moving all extremities equal

## 2020-11-21 NOTE — ED NOTES
Discharge instructions given to pt. Pt verbalized understanding of home care, mediation use and to follow up with pcp. Pt denied further questions or concerns. Pt awaiting medicar transport back to extended stay hotel.

## 2020-11-30 ENCOUNTER — ANCILLARY PROCEDURE (OUTPATIENT)
Dept: CARDIOLOGY | Age: 70
End: 2020-11-30
Attending: INTERNAL MEDICINE

## 2020-11-30 ENCOUNTER — ANCILLARY ORDERS (OUTPATIENT)
Dept: CARDIOLOGY | Age: 70
End: 2020-11-30

## 2020-11-30 ENCOUNTER — TELEPHONE (OUTPATIENT)
Dept: CARDIOLOGY | Age: 70
End: 2020-11-30

## 2020-11-30 DIAGNOSIS — Z95.810 ICD (IMPLANTABLE CARDIOVERTER-DEFIBRILLATOR) IN PLACE: ICD-10-CM

## 2020-11-30 PROCEDURE — 93296 REM INTERROG EVL PM/IDS: CPT | Performed by: INTERNAL MEDICINE

## 2020-11-30 PROCEDURE — 93295 DEV INTERROG REMOTE 1/2/MLT: CPT | Performed by: INTERNAL MEDICINE

## 2020-11-30 PROCEDURE — X1114 CARDIAC DEVICE HOME CHECK - REMOTE UNSCHEDULED: HCPCS | Performed by: INTERNAL MEDICINE

## 2020-12-13 ENCOUNTER — TELEPHONE (OUTPATIENT)
Dept: CARDIOLOGY | Age: 70
End: 2020-12-13

## 2020-12-16 RX ORDER — IRBESARTAN 300 MG/1
TABLET ORAL
Qty: 30 TABLET | Refills: 0 | Status: SHIPPED | OUTPATIENT
Start: 2020-12-16 | End: 2020-12-18 | Stop reason: SDUPTHER

## 2020-12-17 ENCOUNTER — E-ADVICE (OUTPATIENT)
Dept: CARDIOLOGY | Age: 70
End: 2020-12-17

## 2020-12-18 RX ORDER — IRBESARTAN 300 MG/1
300 TABLET ORAL NIGHTLY
Qty: 90 TABLET | Refills: 3 | Status: SHIPPED | OUTPATIENT
Start: 2020-12-18 | End: 2020-12-26 | Stop reason: SDUPTHER

## 2020-12-28 RX ORDER — IRBESARTAN 300 MG/1
300 TABLET ORAL NIGHTLY
Qty: 90 TABLET | Refills: 3 | Status: SHIPPED | OUTPATIENT
Start: 2020-12-28 | End: 2020-12-30 | Stop reason: SDUPTHER

## 2020-12-30 ENCOUNTER — E-ADVICE (OUTPATIENT)
Dept: CARDIOLOGY | Age: 70
End: 2020-12-30

## 2020-12-30 DIAGNOSIS — E78.5 DYSLIPIDEMIA: Primary | ICD-10-CM

## 2020-12-30 DIAGNOSIS — I10 ESSENTIAL HYPERTENSION: ICD-10-CM

## 2020-12-30 RX ORDER — IRBESARTAN 300 MG/1
300 TABLET ORAL NIGHTLY
Qty: 90 TABLET | Refills: 3 | Status: SHIPPED | OUTPATIENT
Start: 2020-12-30 | End: 2021-01-25 | Stop reason: SDUPTHER

## 2021-01-04 ENCOUNTER — APPOINTMENT (OUTPATIENT)
Dept: CARDIOLOGY | Age: 71
End: 2021-01-04

## 2021-01-19 ENCOUNTER — TELEPHONE (OUTPATIENT)
Dept: CARDIOLOGY | Age: 71
End: 2021-01-19

## 2021-01-19 DIAGNOSIS — E78.5 DYSLIPIDEMIA: Primary | ICD-10-CM

## 2021-01-19 DIAGNOSIS — I10 ESSENTIAL HYPERTENSION: ICD-10-CM

## 2021-01-19 DIAGNOSIS — Z79.899 ENCOUNTER FOR LONG-TERM (CURRENT) USE OF OTHER MEDICATIONS: ICD-10-CM

## 2021-01-19 RX ORDER — AMIODARONE HYDROCHLORIDE 200 MG/1
200 TABLET ORAL DAILY
Qty: 90 TABLET | Refills: 0 | Status: SHIPPED | OUTPATIENT
Start: 2021-01-19 | End: 2021-03-16

## 2021-01-20 ENCOUNTER — E-ADVICE (OUTPATIENT)
Dept: CARDIOLOGY | Age: 71
End: 2021-01-20

## 2021-01-22 RX ORDER — PREGABALIN 50 MG/1
50 CAPSULE ORAL 2 TIMES DAILY
COMMUNITY
Start: 2020-12-28

## 2021-01-22 RX ORDER — MECLIZINE HYDROCHLORIDE 25 MG/1
25 TABLET ORAL 3 TIMES DAILY PRN
COMMUNITY
Start: 2020-11-20

## 2021-01-22 RX ORDER — INSULIN ASPART 100 [IU]/ML
INJECTION, SOLUTION INTRAVENOUS; SUBCUTANEOUS
COMMUNITY
Start: 2020-12-11

## 2021-01-25 ENCOUNTER — OFFICE VISIT (OUTPATIENT)
Dept: CARDIOLOGY | Age: 71
End: 2021-01-25

## 2021-01-25 VITALS
HEART RATE: 66 BPM | SYSTOLIC BLOOD PRESSURE: 138 MMHG | WEIGHT: 197 LBS | BODY MASS INDEX: 30.92 KG/M2 | DIASTOLIC BLOOD PRESSURE: 92 MMHG | HEIGHT: 67 IN

## 2021-01-25 DIAGNOSIS — Z23 NEED FOR VACCINATION: ICD-10-CM

## 2021-01-25 DIAGNOSIS — E78.5 DYSLIPIDEMIA: ICD-10-CM

## 2021-01-25 DIAGNOSIS — I26.99 OTHER PULMONARY EMBOLISM WITHOUT ACUTE COR PULMONALE, UNSPECIFIED CHRONICITY (CMD): Primary | ICD-10-CM

## 2021-01-25 DIAGNOSIS — Z98.890 S/P ASCENDING AORTIC ANEURYSM REPAIR: ICD-10-CM

## 2021-01-25 DIAGNOSIS — Z86.79 S/P ASCENDING AORTIC ANEURYSM REPAIR: ICD-10-CM

## 2021-01-25 DIAGNOSIS — Z95.2 HISTORY OF AORTIC VALVE REPLACEMENT: ICD-10-CM

## 2021-01-25 DIAGNOSIS — I10 ESSENTIAL HYPERTENSION: ICD-10-CM

## 2021-01-25 PROCEDURE — 99214 OFFICE O/P EST MOD 30 MIN: CPT | Performed by: INTERNAL MEDICINE

## 2021-01-25 PROCEDURE — 3080F DIAST BP >= 90 MM HG: CPT | Performed by: INTERNAL MEDICINE

## 2021-01-25 PROCEDURE — 3075F SYST BP GE 130 - 139MM HG: CPT | Performed by: INTERNAL MEDICINE

## 2021-01-25 RX ORDER — FUROSEMIDE 20 MG/1
20 TABLET ORAL 2 TIMES DAILY
COMMUNITY
End: 2021-01-25 | Stop reason: SDUPTHER

## 2021-01-25 RX ORDER — FUROSEMIDE 20 MG/1
20 TABLET ORAL 2 TIMES DAILY
Qty: 90 TABLET | Refills: 3 | Status: SHIPPED | OUTPATIENT
Start: 2021-01-25 | End: 2021-08-26 | Stop reason: SDUPTHER

## 2021-01-25 RX ORDER — METOCLOPRAMIDE 10 MG/1
10 TABLET ORAL PRN
COMMUNITY

## 2021-01-25 RX ORDER — PREGABALIN 50 MG/1
50 CAPSULE ORAL 2 TIMES DAILY
COMMUNITY

## 2021-01-25 RX ORDER — CYCLOBENZAPRINE HCL 5 MG
5 TABLET ORAL 3 TIMES DAILY PRN
COMMUNITY

## 2021-01-25 RX ORDER — IRBESARTAN 300 MG/1
300 TABLET ORAL NIGHTLY
Qty: 90 TABLET | Refills: 3 | Status: SHIPPED | OUTPATIENT
Start: 2021-01-25

## 2021-01-25 ASSESSMENT — ENCOUNTER SYMPTOMS
COUGH: 0
WEIGHT GAIN: 0
HEMATOCHEZIA: 0
CHILLS: 0
FEVER: 0
ALLERGIC/IMMUNOLOGIC COMMENTS: NO NEW FOOD ALLERGIES
HEMOPTYSIS: 0
BRUISES/BLEEDS EASILY: 0
SUSPICIOUS LESIONS: 0
WEIGHT LOSS: 0

## 2021-02-02 ENCOUNTER — E-ADVICE (OUTPATIENT)
Dept: CARDIOLOGY | Age: 71
End: 2021-02-02

## 2021-03-01 PROCEDURE — 93295 DEV INTERROG REMOTE 1/2/MLT: CPT | Performed by: INTERNAL MEDICINE

## 2021-03-01 PROCEDURE — 93296 REM INTERROG EVL PM/IDS: CPT | Performed by: INTERNAL MEDICINE

## 2021-03-04 ENCOUNTER — ANCILLARY PROCEDURE (OUTPATIENT)
Dept: CARDIOLOGY | Age: 71
End: 2021-03-04
Attending: INTERNAL MEDICINE

## 2021-03-04 ENCOUNTER — ANCILLARY ORDERS (OUTPATIENT)
Dept: CARDIOLOGY | Age: 71
End: 2021-03-04

## 2021-03-04 DIAGNOSIS — Z95.810 ICD (IMPLANTABLE CARDIOVERTER-DEFIBRILLATOR) IN PLACE: ICD-10-CM

## 2021-03-04 PROCEDURE — X1114 CARDIAC DEVICE HOME CHECK - REMOTE UNSCHEDULED: HCPCS | Performed by: INTERNAL MEDICINE

## 2021-03-16 RX ORDER — AMIODARONE HYDROCHLORIDE 200 MG/1
200 TABLET ORAL DAILY
Qty: 90 TABLET | Refills: 0 | Status: SHIPPED | OUTPATIENT
Start: 2021-03-16

## 2021-03-20 ENCOUNTER — APPOINTMENT (OUTPATIENT)
Dept: LAB | Age: 71
End: 2021-03-20

## 2021-03-23 ENCOUNTER — IMMUNIZATION (OUTPATIENT)
Dept: LAB | Age: 71
End: 2021-03-23

## 2021-03-23 DIAGNOSIS — Z23 NEED FOR VACCINATION: Primary | ICD-10-CM

## 2021-03-23 PROCEDURE — 0001A COVID 19 PFIZER-BIONTECH: CPT | Performed by: HOSPITALIST

## 2021-03-23 PROCEDURE — 91300 COVID 19 PFIZER-BIONTECH: CPT | Performed by: HOSPITALIST

## 2021-04-13 ENCOUNTER — IMMUNIZATION (OUTPATIENT)
Dept: LAB | Age: 71
End: 2021-04-13
Attending: HOSPITALIST

## 2021-04-13 DIAGNOSIS — Z23 NEED FOR VACCINATION: Primary | ICD-10-CM

## 2021-04-13 PROCEDURE — 0002A COVID 19 PFIZER-BIONTECH: CPT

## 2021-04-13 PROCEDURE — 91300 COVID 19 PFIZER-BIONTECH: CPT

## 2021-06-17 ENCOUNTER — E-ADVICE (OUTPATIENT)
Dept: CARDIOLOGY | Age: 71
End: 2021-06-17

## 2021-06-17 ENCOUNTER — ANCILLARY ORDERS (OUTPATIENT)
Dept: CARDIOLOGY | Age: 71
End: 2021-06-17

## 2021-06-17 ENCOUNTER — ANCILLARY PROCEDURE (OUTPATIENT)
Dept: CARDIOLOGY | Age: 71
End: 2021-06-17
Attending: INTERNAL MEDICINE

## 2021-06-17 DIAGNOSIS — Z95.810 ICD (IMPLANTABLE CARDIOVERTER-DEFIBRILLATOR) IN PLACE: ICD-10-CM

## 2021-06-17 PROCEDURE — X1114 CARDIAC DEVICE HOME CHECK - REMOTE UNSCHEDULED: HCPCS | Performed by: INTERNAL MEDICINE

## 2021-07-13 ENCOUNTER — APPOINTMENT (OUTPATIENT)
Dept: GENERAL RADIOLOGY | Facility: HOSPITAL | Age: 71
DRG: 552 | End: 2021-07-13
Attending: EMERGENCY MEDICINE
Payer: MEDICARE

## 2021-07-13 ENCOUNTER — HOSPITAL ENCOUNTER (INPATIENT)
Facility: HOSPITAL | Age: 71
LOS: 1 days | Discharge: HOME | DRG: 552 | End: 2021-07-16
Attending: EMERGENCY MEDICINE | Admitting: HOSPITALIST
Payer: MEDICARE

## 2021-07-13 ENCOUNTER — APPOINTMENT (OUTPATIENT)
Dept: CT IMAGING | Facility: HOSPITAL | Age: 71
DRG: 552 | End: 2021-07-13
Attending: EMERGENCY MEDICINE
Payer: MEDICARE

## 2021-07-13 DIAGNOSIS — M48.061 SPINAL STENOSIS OF LUMBAR REGION, UNSPECIFIED WHETHER NEUROGENIC CLAUDICATION PRESENT: ICD-10-CM

## 2021-07-13 DIAGNOSIS — S79.911A HIP INJURY, RIGHT, INITIAL ENCOUNTER: Primary | ICD-10-CM

## 2021-07-13 DIAGNOSIS — M89.9 LESION OF BONE OF LUMBOSACRAL SPINE: ICD-10-CM

## 2021-07-13 DIAGNOSIS — R73.9 HYPERGLYCEMIA: ICD-10-CM

## 2021-07-13 LAB
ANION GAP SERPL CALC-SCNC: 13 MMOL/L (ref 0–18)
BASOPHILS # BLD AUTO: 0.04 X10(3) UL (ref 0–0.2)
BASOPHILS NFR BLD AUTO: 0.6 %
BILIRUB UR QL: NEGATIVE
BUN BLD-MCNC: 36 MG/DL (ref 7–18)
BUN/CREAT SERPL: 13 (ref 10–20)
CALCIUM BLD-MCNC: 9.6 MG/DL (ref 8.5–10.1)
CHLORIDE SERPL-SCNC: 91 MMOL/L (ref 98–112)
CLARITY UR: CLEAR
CO2 SERPL-SCNC: 25 MMOL/L (ref 21–32)
COLOR UR: YELLOW
CREAT BLD-MCNC: 2.77 MG/DL
DEPRECATED RDW RBC AUTO: 43.8 FL (ref 35.1–46.3)
EOSINOPHIL # BLD AUTO: 0.16 X10(3) UL (ref 0–0.7)
EOSINOPHIL NFR BLD AUTO: 2.4 %
ERYTHROCYTE [DISTWIDTH] IN BLOOD BY AUTOMATED COUNT: 13.2 % (ref 11–15)
EST. AVERAGE GLUCOSE BLD GHB EST-MCNC: 378 MG/DL (ref 68–126)
GLUCOSE BLD-MCNC: 564 MG/DL (ref 70–99)
GLUCOSE BLDC GLUCOMTR-MCNC: 128 MG/DL (ref 70–99)
GLUCOSE BLDC GLUCOMTR-MCNC: 192 MG/DL (ref 70–99)
GLUCOSE BLDC GLUCOMTR-MCNC: 218 MG/DL (ref 70–99)
GLUCOSE BLDC GLUCOMTR-MCNC: 378 MG/DL (ref 70–99)
GLUCOSE BLDC GLUCOMTR-MCNC: 488 MG/DL (ref 70–99)
GLUCOSE BLDC GLUCOMTR-MCNC: 533 MG/DL (ref 70–99)
GLUCOSE UR-MCNC: >=500 MG/DL
HBA1C MFR BLD HPLC: 14.8 % (ref ?–5.7)
HCT VFR BLD AUTO: 42.4 %
HGB BLD-MCNC: 14.6 G/DL
HGB UR QL STRIP.AUTO: NEGATIVE
IMM GRANULOCYTES # BLD AUTO: 0.02 X10(3) UL (ref 0–1)
IMM GRANULOCYTES NFR BLD: 0.3 %
KETONES UR-MCNC: NEGATIVE MG/DL
LEUKOCYTE ESTERASE UR QL STRIP.AUTO: NEGATIVE
LYMPHOCYTES # BLD AUTO: 1.4 X10(3) UL (ref 1–4)
LYMPHOCYTES NFR BLD AUTO: 21.2 %
MCH RBC QN AUTO: 31.3 PG (ref 26–34)
MCHC RBC AUTO-ENTMCNC: 34.4 G/DL (ref 31–37)
MCV RBC AUTO: 90.8 FL
MONOCYTES # BLD AUTO: 0.47 X10(3) UL (ref 0.1–1)
MONOCYTES NFR BLD AUTO: 7.1 %
NEUTROPHILS # BLD AUTO: 4.5 X10 (3) UL (ref 1.5–7.7)
NEUTROPHILS # BLD AUTO: 4.5 X10(3) UL (ref 1.5–7.7)
NEUTROPHILS NFR BLD AUTO: 68.4 %
NITRITE UR QL STRIP.AUTO: NEGATIVE
OSMOLALITY SERPL CALC.SUM OF ELEC: 302 MOSM/KG (ref 275–295)
PH UR: 8 [PH] (ref 5–8)
PLATELET # BLD AUTO: 177 10(3)UL (ref 150–450)
POTASSIUM SERPL-SCNC: 4.1 MMOL/L (ref 3.5–5.1)
PROT UR-MCNC: 30 MG/DL
RBC # BLD AUTO: 4.67 X10(6)UL
SODIUM SERPL-SCNC: 129 MMOL/L (ref 136–145)
SP GR UR STRIP: 1.02 (ref 1–1.03)
UROBILINOGEN UR STRIP-ACNC: <2
WBC # BLD AUTO: 6.6 X10(3) UL (ref 4–11)

## 2021-07-13 PROCEDURE — 99223 1ST HOSP IP/OBS HIGH 75: CPT | Performed by: HOSPITALIST

## 2021-07-13 PROCEDURE — 73502 X-RAY EXAM HIP UNI 2-3 VIEWS: CPT | Performed by: EMERGENCY MEDICINE

## 2021-07-13 PROCEDURE — 73700 CT LOWER EXTREMITY W/O DYE: CPT | Performed by: EMERGENCY MEDICINE

## 2021-07-13 RX ORDER — INSULIN ASPART 100 [IU]/ML
10 INJECTION, SOLUTION INTRAVENOUS; SUBCUTANEOUS ONCE
Status: COMPLETED | OUTPATIENT
Start: 2021-07-13 | End: 2021-07-13

## 2021-07-13 RX ORDER — DEXTROSE MONOHYDRATE 25 G/50ML
50 INJECTION, SOLUTION INTRAVENOUS
Status: DISCONTINUED | OUTPATIENT
Start: 2021-07-13 | End: 2021-07-16

## 2021-07-13 RX ORDER — ACETAMINOPHEN 325 MG/1
650 TABLET ORAL EVERY 6 HOURS PRN
Status: DISCONTINUED | OUTPATIENT
Start: 2021-07-13 | End: 2021-07-16

## 2021-07-13 RX ORDER — ONDANSETRON 4 MG/1
4 TABLET, ORALLY DISINTEGRATING ORAL ONCE
Status: COMPLETED | OUTPATIENT
Start: 2021-07-13 | End: 2021-07-13

## 2021-07-13 RX ORDER — HYDROCODONE BITARTRATE AND ACETAMINOPHEN 5; 325 MG/1; MG/1
1 TABLET ORAL EVERY 4 HOURS PRN
Status: DISCONTINUED | OUTPATIENT
Start: 2021-07-13 | End: 2021-07-15

## 2021-07-13 RX ORDER — INSULIN ASPART 100 [IU]/ML
0.2 INJECTION, SOLUTION INTRAVENOUS; SUBCUTANEOUS ONCE
Status: COMPLETED | OUTPATIENT
Start: 2021-07-13 | End: 2021-07-13

## 2021-07-13 RX ORDER — METOCLOPRAMIDE HYDROCHLORIDE 5 MG/ML
5 INJECTION INTRAMUSCULAR; INTRAVENOUS EVERY 8 HOURS PRN
Status: DISCONTINUED | OUTPATIENT
Start: 2021-07-13 | End: 2021-07-16

## 2021-07-13 RX ORDER — ONDANSETRON 4 MG/1
TABLET, ORALLY DISINTEGRATING ORAL
Status: COMPLETED
Start: 2021-07-13 | End: 2021-07-13

## 2021-07-13 RX ORDER — ONDANSETRON 2 MG/ML
4 INJECTION INTRAMUSCULAR; INTRAVENOUS EVERY 6 HOURS PRN
Status: DISCONTINUED | OUTPATIENT
Start: 2021-07-13 | End: 2021-07-16

## 2021-07-13 RX ORDER — ACETAMINOPHEN 325 MG/1
650 TABLET ORAL EVERY 4 HOURS PRN
Status: DISCONTINUED | OUTPATIENT
Start: 2021-07-13 | End: 2021-07-16

## 2021-07-13 RX ORDER — HYDROCODONE BITARTRATE AND ACETAMINOPHEN 5; 325 MG/1; MG/1
2 TABLET ORAL EVERY 4 HOURS PRN
Status: DISCONTINUED | OUTPATIENT
Start: 2021-07-13 | End: 2021-07-15

## 2021-07-13 NOTE — ED NOTES
Orders for admission, patient is aware of plan and ready to go upstairs. Any questions, please call ED LIZZY balderas  at extension [phone].     Type of COVID test sent: vaccinated  COVID Suspicion level: Low    Titratable drug(s) infusin  Rate:0     LOC at ti

## 2021-07-13 NOTE — ED INITIAL ASSESSMENT (HPI)
C/o right hip pain after trying to get out of the bath tub and twisting the wrong way.   Cms intact, unable to move leg

## 2021-07-13 NOTE — ED PROVIDER NOTES
Patient Seen in: [facility] AND [facility] Emergency Department      History   Patient presents with:  Leg or Foot Injury    Stated Complaint:     HPI/Subjective:   HPI  Patient is a 69-year-old male history of CAD s/p CABG, pacemaker/AICD, hypertension, hyperli Family History   Problem Relation Age of Onset   • Heart Attack Mother    • Thyroid Disorder Mother    • Cancer Paternal Grandfather    • Heart Disease Paternal Grandmother    • Alcohol and Other Disorders Associated Father    • Diabetes Father    • S There is no guarding or rebound. Musculoskeletal:         General: Tenderness present. No swelling or deformity. Normal range of motion. Cervical back: Normal range of motion and neck supple.       Comments: Tenderness over the right hip joint, no pa ------                     CBC W/ DIFFERENTIAL[[id number]]                              Final result                 Please view results for these tests on the individual orders.    HEMOGLOBIN A1C   RAINBOW DRAW LAVENDER   RAINBOW DRAW LIGHT GREEN   LORE Endocrinology placed on consult for poorly controlled diabetes.                          Disposition and Plan     Clinical Impression:  Hip injury, right, initial encounter  (primary encounter diagnosis)  Hyperglycemia     Disposition:  Admit  7/13/2021  3:

## 2021-07-14 LAB
ANION GAP SERPL CALC-SCNC: 5 MMOL/L (ref 0–18)
BASOPHILS # BLD AUTO: 0.03 X10(3) UL (ref 0–0.2)
BASOPHILS NFR BLD AUTO: 0.4 %
BUN BLD-MCNC: 27 MG/DL (ref 7–18)
BUN/CREAT SERPL: 11.3 (ref 10–20)
CALCIUM BLD-MCNC: 8.7 MG/DL (ref 8.5–10.1)
CHLORIDE SERPL-SCNC: 103 MMOL/L (ref 98–112)
CO2 SERPL-SCNC: 30 MMOL/L (ref 21–32)
CREAT BLD-MCNC: 2.39 MG/DL
DEPRECATED RDW RBC AUTO: 46.4 FL (ref 35.1–46.3)
EOSINOPHIL # BLD AUTO: 0.2 X10(3) UL (ref 0–0.7)
EOSINOPHIL NFR BLD AUTO: 2.7 %
ERYTHROCYTE [DISTWIDTH] IN BLOOD BY AUTOMATED COUNT: 13.5 % (ref 11–15)
GLUCOSE BLD-MCNC: 276 MG/DL (ref 70–99)
GLUCOSE BLDC GLUCOMTR-MCNC: 105 MG/DL (ref 70–99)
GLUCOSE BLDC GLUCOMTR-MCNC: 252 MG/DL (ref 70–99)
GLUCOSE BLDC GLUCOMTR-MCNC: 307 MG/DL (ref 70–99)
GLUCOSE BLDC GLUCOMTR-MCNC: 338 MG/DL (ref 70–99)
HCT VFR BLD AUTO: 39.6 %
HGB BLD-MCNC: 13.2 G/DL
IMM GRANULOCYTES # BLD AUTO: 0.02 X10(3) UL (ref 0–1)
IMM GRANULOCYTES NFR BLD: 0.3 %
LYMPHOCYTES # BLD AUTO: 1.63 X10(3) UL (ref 1–4)
LYMPHOCYTES NFR BLD AUTO: 21.9 %
MCH RBC QN AUTO: 31.5 PG (ref 26–34)
MCHC RBC AUTO-ENTMCNC: 33.3 G/DL (ref 31–37)
MCV RBC AUTO: 94.5 FL
MONOCYTES # BLD AUTO: 0.6 X10(3) UL (ref 0.1–1)
MONOCYTES NFR BLD AUTO: 8.1 %
NEUTROPHILS # BLD AUTO: 4.96 X10 (3) UL (ref 1.5–7.7)
NEUTROPHILS # BLD AUTO: 4.96 X10(3) UL (ref 1.5–7.7)
NEUTROPHILS NFR BLD AUTO: 66.6 %
OSMOLALITY SERPL CALC.SUM OF ELEC: 301 MOSM/KG (ref 275–295)
PLATELET # BLD AUTO: 148 10(3)UL (ref 150–450)
POTASSIUM SERPL-SCNC: 4.7 MMOL/L (ref 3.5–5.1)
RBC # BLD AUTO: 4.19 X10(6)UL
SODIUM SERPL-SCNC: 138 MMOL/L (ref 136–145)
WBC # BLD AUTO: 7.4 X10(3) UL (ref 4–11)

## 2021-07-14 PROCEDURE — 99223 1ST HOSP IP/OBS HIGH 75: CPT | Performed by: INTERNAL MEDICINE

## 2021-07-14 PROCEDURE — 99233 SBSQ HOSP IP/OBS HIGH 50: CPT | Performed by: HOSPITALIST

## 2021-07-14 RX ORDER — AMIODARONE HYDROCHLORIDE 200 MG/1
200 TABLET ORAL DAILY
Status: DISCONTINUED | OUTPATIENT
Start: 2021-07-14 | End: 2021-07-16

## 2021-07-14 RX ORDER — LOSARTAN POTASSIUM 100 MG/1
100 TABLET ORAL NIGHTLY
Status: DISCONTINUED | OUTPATIENT
Start: 2021-07-14 | End: 2021-07-16

## 2021-07-14 RX ORDER — POLYETHYLENE GLYCOL 3350 17 G/17G
17 POWDER, FOR SOLUTION ORAL DAILY PRN
Status: DISCONTINUED | OUTPATIENT
Start: 2021-07-14 | End: 2021-07-16

## 2021-07-14 RX ORDER — ALBUTEROL SULFATE 90 UG/1
2 AEROSOL, METERED RESPIRATORY (INHALATION) EVERY 6 HOURS PRN
Status: DISCONTINUED | OUTPATIENT
Start: 2021-07-14 | End: 2021-07-16

## 2021-07-14 NOTE — CM/SW NOTE
JACOB received MDO for discharge planning. PT/OT recommending SALEEM. SW met with patient at bedside to discuss discharge planning. Patient confirmed address on facesheet. Patient reports that he lives at a hotel alone.  Patient reports being independent with [facility] pending until PCP established  Patient is aware of above. SW received call from [facility] liaison who reports that she received notice that patient must obtain PCP and then contact [facility] for services. Patient aware of this. Plan: Home.  Patient to [facility]

## 2021-07-14 NOTE — PLAN OF CARE
Problem: Patient Centered Care  Goal: Patient preferences are identified and integrated in the patient's plan of care  Description: Interventions:  - What would you like us to know as we care for you?  I cant walk because of pain on my right hip  - Provid unsuccessful or patient reports new pain  - Anticipate increased pain with activity and pre-medicate as appropriate  7/14/2021 0059 by Cole Whiting RN  Outcome: Progressing  7/14/2021 0058 by Cole Whiting RN  Outcome: Progressing     Problem: RISK FOR cognitive ability or social support system  Outcome: Progressing

## 2021-07-14 NOTE — CONSULTS
[facility]OSP - [facility]    Report of Consultation    Fabian Bates III Patient Status:  Observation    3/21/1950 MRN [id number]   Location [facility] 5SW/SE Attending Kerri Busby MD   Hosp Day # 0 PCP None Pcp     Date of Admission:  2021 aneurysm repair   • OTHER SURGICAL HISTORY  2/2011    reimplantation of bypass grafts   • VALVE REPLACEMENT  2/2011    aortic- bioprosthetic       Family History  Family History   Problem Relation Age of Onset   • Heart Attack Mother    • Thyroid Disorder mg, 650 mg, Oral, Q4H PRN   Or  HYDROcodone-acetaminophen (NORCO) 5-325 MG per tab 1 tablet, 1 tablet, Oral, Q4H PRN   Or  HYDROcodone-acetaminophen (NORCO) 5-325 MG per tab 2 tablet, 2 tablet, Oral, Q4H PRN  lidocaine-menthol 4-1 % 1 patch, 1 patch, Trans (ENSURE IMMUNE HEALTH OR),   ACAI LEPE OR,   B Complex Vitamins (B COMPLEX 1 OR),   Multiple Minerals (MULTI-MINERALS OR),   MULTIPLE VITAMIN OR, Take 1 packet by mouth daily.   NON FORMULARY, MacroLife in Smoothies   NON FORMULARY, Magnesium Orotate   NON (CALCIUM/C/D OR),   COENZYME Q10 OR,   Misc Natural Products (JOINT SUPPORT COMPLEX OR),   Flaxseed, Linseed, (FLAX OR),   NON FORMULARY, Organic Wheat Grass Juice Powder  (Patient not taking: Reported on 7/13/2021 )  NON FORMULARY, Phytocal O  (Patient no 276 (H) 07/14/2021    CA 8.7 07/14/2021    ALB 4.0 08/17/2020    ALB 4.80 08/17/2020    ALKPHO 59 08/17/2020    TP 8.1 08/17/2020    TP 7.7 08/17/2020    AST 11 (L) 08/17/2020    ALT 25 08/17/2020    PTT 59.4 (H) 04/02/2017    INR 1.03 04/02/2017    PTP 13

## 2021-07-14 NOTE — OCCUPATIONAL THERAPY NOTE
OCCUPATIONAL THERAPY EVALUATION - INPATIENT     Room Number: 552/552-A  Evaluation Date: 7/14/2021  Type of Evaluation: Initial       Physician Order: IP Consult to Occupational Therapy  Reason for Therapy: ADL/IADL Dysfunction and Discharge Planning    OC supports that patients with this level of impairment may benefit from SALEEM.      DISCHARGE RECOMMENDATIONS  OT Discharge Recommendations: Sub-acute rehabilitation  OT Device Recommendations: TBD    PLAN  OT Treatment Plan: Energy conservation/work simplifica hotel)  Home Layout: One level (elevator bulding)  Lives With: Alone     Toilet and Equipment: Standard height toilet  Shower/Tub and Equipment: Tub-shower combo;Grab bar             Drives: Yes  Patient Regularly Uses: Glasses      Prior Level of Independ 7/25  Frequency: 3-5x week    Dwayne Rutledge, OTR/L ext [phone]

## 2021-07-14 NOTE — PROGRESS NOTES
[facility]  Progress Note     Leny Hoffmanmarv BLACKWELL  : 3/21/1950    Status: Observation  Day #: 0    Attending: Jason Silva MD  PCP: None Pcp      Assessment and Plan     Back pain and progressive LE weakness  -h/o lumbar stenosis  -MRI L spi 7/13/2021  CONCLUSION:   Mild right hip osteoarthritis without acute fracture or dislocation. High-grade chronic partial-thickness of the adductor longus. Soft tissue contusion within the right lateral hip soft tissues.     Dictated by (CST): Carson Arreguin,

## 2021-07-14 NOTE — PROGRESS NOTES
[facility]    Progress Note    Yulissa Abbasi III Patient Status:  Observation    3/21/1950 MRN [id number]   Location [facility] 5SW/SE Attending Radha Pedroza MD   Hosp Day # 1 PCP None Pcp     Subjective:  Feels better  Eating we Proteinuria, unspecified     DARWIN (acute kidney injury) ([facility]     Uncontrolled type 2 diabetes mellitus with circulatory disorder, without long-term current use of insulin ([facility]     Type 2 diabetes mellitus with stage 3 chronic kidney disease, without long-

## 2021-07-14 NOTE — PHYSICAL THERAPY NOTE
PHYSICAL THERAPY EVALUATION - INPATIENT     Room Number: 552/552-A  Evaluation Date: 7/14/2021  Type of Evaluation: Initial   Physician Order: PT Eval and Treat    Presenting Problem: hip injury of right side after fall - negative for fracture  Reason for mechanics; Endurance; Energy conservation;Gait training;Range of motion;Strengthening;Stair training;Transfer training;Balance training  Rehab Potential : Fair  Frequency (Obs): Daily       PHYSICAL THERAPY MEDICAL/SOCIAL HISTORY     History related to suk[place] Level of [place]: independent    SUBJECTIVE  \"I got a call from my ambassador friend in [place] in November 2019 to isolate myself, so I sold my house. \"     PHYSICAL THERAPY EXAMINATION     OBJECTIVE  Precautions: Limb alert - right  Fall Risk: High f mechanics  Functional activity tolerated  Gait training  Transfer training    Patient End of Session: Up in chair;Needs met;Call light within reach;RN aware of session/findings; All patient questions and concerns addressed; Alarm set    CURRENT GOALS    Goal

## 2021-07-14 NOTE — PLAN OF CARE
Patient worked with PT/OT and the patient is able to get up with one assist and walker. Accuchek has been elevated through the day, Endocrinology updated on each blood sugar. Patients home meds added today. Patient given Norco as needed for pain.  Patient M management  - Manage/alleviate anxiety  - Utilize distraction and/or relaxation techniques  - Monitor for opioid side effects  - Notify MD/LIP if interventions unsuccessful or patient reports new pain  - Anticipate increased pain with activity and pre-medi physician/LIP order or complex needs related to functional status, cognitive ability or social support system  Outcome: Progressing

## 2021-07-14 NOTE — DIABETES ED
[facility]    Diabetes Education  Note    Zulema Garibays III Patient Status:  Observation   3/21/1950 MRN [id number]  Location [facility] 5SW/SE Attending Lucinda Cordova MD  Hosp Day # 0 PCP None Pcp      Labs:    HGBA1C (%)   Date Va complications  · Importance of close follow up with PCP and medical team    Patient verbalized understanding, was receptive to information provided.       Recommendations:  Patient to self-administer all insulin doses with RN supervision   [facility]ult [facility] FOR [facility]ICINE

## 2021-07-14 NOTE — H&P
[facility]    PATIENT'S NAME: Yuliana Dozier   ATTENDING PHYSICIAN: Maria Guadalupe Valentine MD   PATIENT ACCOUNT#:   [id number]    LOCATION:  [address] [place] [address] RECORD #:   [id number]       YOB: 1950  ADMISSION DATE:       07/13/ tobacco or drug use. Social alcohol. Lives in a hotel. Usually independent in his basic activities of daily living. REVIEW OF SYSTEMS:  He had polyuria and polydipsia lately.   He has been having tingling and numbness in his ankles and feet with marcus his Accu-Cheks. Supplement with short-acting insulin. Fall precautions. Physical therapy. Pain control. Further recommendations to follow.     Dictated By Steven Alvarez MD  d: 07/13/2021 16:12:20  t: 07/13/2021 16:52:28  Job [id number]  [id number]

## 2021-07-15 ENCOUNTER — APPOINTMENT (OUTPATIENT)
Dept: MRI IMAGING | Facility: HOSPITAL | Age: 71
DRG: 552 | End: 2021-07-15
Attending: HOSPITALIST
Payer: MEDICARE

## 2021-07-15 LAB
GLUCOSE BLDC GLUCOMTR-MCNC: 178 MG/DL (ref 70–99)
GLUCOSE BLDC GLUCOMTR-MCNC: 275 MG/DL (ref 70–99)
GLUCOSE BLDC GLUCOMTR-MCNC: 283 MG/DL (ref 70–99)
GLUCOSE BLDC GLUCOMTR-MCNC: 298 MG/DL (ref 70–99)
PSA SERPL-MCNC: 2.12 NG/ML (ref ?–4)

## 2021-07-15 PROCEDURE — 99233 SBSQ HOSP IP/OBS HIGH 50: CPT | Performed by: INTERNAL MEDICINE

## 2021-07-15 PROCEDURE — 72148 MRI LUMBAR SPINE W/O DYE: CPT | Performed by: HOSPITALIST

## 2021-07-15 PROCEDURE — 99233 SBSQ HOSP IP/OBS HIGH 50: CPT | Performed by: HOSPITALIST

## 2021-07-15 RX ORDER — ACETAMINOPHEN AND CODEINE PHOSPHATE 300; 30 MG/1; MG/1
1 TABLET ORAL EVERY 4 HOURS PRN
Status: DISCONTINUED | OUTPATIENT
Start: 2021-07-15 | End: 2021-07-16

## 2021-07-15 RX ORDER — HYDROCODONE BITARTRATE AND ACETAMINOPHEN 5; 325 MG/1; MG/1
1 TABLET ORAL EVERY 4 HOURS PRN
Qty: 15 TABLET | Refills: 0 | Status: SHIPPED | OUTPATIENT
Start: 2021-07-15 | End: 2021-07-16

## 2021-07-15 RX ORDER — DOCUSATE SODIUM 100 MG/1
100 CAPSULE, LIQUID FILLED ORAL 2 TIMES DAILY
Status: DISCONTINUED | OUTPATIENT
Start: 2021-07-15 | End: 2021-07-16

## 2021-07-15 NOTE — PLAN OF CARE
Problem: Patient Centered Care  Goal: Patient preferences are identified and integrated in the patient's plan of care  Description: Interventions:  - What would you like us to know as we care for you?  Stays in hotel currently  Problem: Patient/Family [facility] Educate pt/family on patient safety including physical limitations  - Instruct pt to call for assistance with activity based on assessment  - Modify environment to reduce risk of injury  - Provide assistive devices as appropriate  - Consider OT/PT consult

## 2021-07-15 NOTE — PROGRESS NOTES
[facility]  Progress Note     Lety Pappas III  : 3/21/1950    Status: Observation  Day #: 0    Attending: Troy Vergara MD  PCP: None Pcp      Assessment and Plan     Back pain and progressive LE weakness  -h/o lumbar stenosis  -MRI L spi 564* 276*       CT HIP(BONE) RIGHT (CPT=73700)    Result Date: 7/13/2021  CONCLUSION:   Mild right hip osteoarthritis without acute fracture or dislocation. High-grade chronic partial-thickness of the adductor longus.   Soft tissue contusion within the rig

## 2021-07-15 NOTE — PLAN OF CARE
No acute changes. Pt up to the bathroom w 1 assist and walker, ambulated well and without any issues. Vitals stable. BG being monitored. Safety precautions in place and frequent rounding by staff.     Problem: Patient Centered Care  Goal: Patient preference pre-medicate as appropriate  Outcome: Progressing     Problem: RISK FOR INFECTION - ADULT  Goal: Absence of fever/infection during anticipated neutropenic period  Description: INTERVENTIONS  - Monitor WBC  - Administer growth factors as ordered  - Silver

## 2021-07-15 NOTE — SIGNIFICANT EVENT
MRI L spine showing lumbar stenosis and lesion within L1.  -discussed with Dr. Johanny Le of neurosurgery and reviewed the MRI  -will check PSA  -probable bone scan as outpatient   -discussed that he will need to follow this up with his new PCP    [id number] Ashley kent

## 2021-07-15 NOTE — PHYSICAL THERAPY NOTE
PHYSICAL THERAPY TREATMENT NOTE - INPATIENT     Room Number: 552/552-A       Presenting Problem: hip injury of right side after fall - negative for fracture    Problem List  Principal Problem:    Hip injury, right, initial encounter  Active Problems:    Hy change in pain medication. \"     OBJECTIVE  Precautions: Limb alert - right    WEIGHT BEARING RESTRICTION  Weight Bearing Restriction: None                PAIN ASSESSMENT   Ratin  Location: R hip  Management Techniques: Activity promotion;  Goal #1 Patient is able to demonstrate supine - sit EOB @ level: supervision      Goal #1   Current Status  NT   Goal #2 Patient is able to demonstrate transfers Sit to/from Stand at assistance level: mod I with walker - rolling      Goal #2  Current Sta

## 2021-07-15 NOTE — DIABETES ED
RN diabetes educator followed up with patient to ensure that he did not have any questions prior to discharge.   Educator reviewed with patient the importance of checking blood glucose twice daily and to make sure he is taking all prescribed medications as

## 2021-07-15 NOTE — PROGRESS NOTES
[facility]    Progress Note    [place] Setting III Patient Status:  Observation    3/21/1950 MRN [id number]   Location [facility] 5SW/SE Attending Laly Delgado MD   Hosp Day # 0 PCP None Pcp     Date of Admission:  2021  Date of HISTORY  2/2011    reimplantation of bypass grafts   • VALVE REPLACEMENT  2/2011    aortic- bioprosthetic       Family History  Family History   Problem Relation Age of Onset   • Heart Attack Mother    • Thyroid Disorder Mother    • Cancer Paternal José Miguel Harness PRN  acetaminophen (TYLENOL) tab 650 mg, 650 mg, Oral, Q4H PRN   Or  HYDROcodone-acetaminophen (NORCO) 5-325 MG per tab 1 tablet, 1 tablet, Oral, Q4H PRN   Or  HYDROcodone-acetaminophen (NORCO) 5-325 MG per tab 2 tablet, 2 tablet, Oral, Q4H PRN  lidocaine- Green Superfood   Nutritional Supplements (ENSURE IMMUNE HEALTH OR),   ACAI LEPE OR,   B Complex Vitamins (B COMPLEX 1 OR),   Multiple Minerals (MULTI-MINERALS OR),   MULTIPLE VITAMIN OR, Take 1 packet by mouth daily.   NON FORMULARY, MacroLife in Smoothie CALCIUM OR,   Calcium-Vitamins C & D (CALCIUM/C/D OR),   COENZYME Q10 OR,   Misc Natural Products (JOINT SUPPORT COMPLEX OR),   Flaxseed, Linseed, (FLAX OR),   NON FORMULARY, Organic Wheat Grass Juice Powder  (Patient not taking: Reported on 7/13/2021 )  N 07/14/2021    CO2 30.0 07/14/2021     (H) 07/14/2021    CA 8.7 07/14/2021    ALB 4.0 08/17/2020    ALB 4.80 08/17/2020    ALKPHO 59 08/17/2020    TP 8.1 08/17/2020    TP 7.7 08/17/2020    AST 11 (L) 08/17/2020    ALT 25 08/17/2020    PTT 59.4 (H) 04

## 2021-07-15 NOTE — OCCUPATIONAL THERAPY NOTE
OCCUPATIONAL THERAPY TREATMENT NOTE - INPATIENT        Room Number: 552/552-A                Problem List  Principal Problem:    Hip injury, right, initial encounter  Active Problems:    Hyperglycemia      OCCUPATIONAL THERAPY ASSESSMENT   The patient's Ap need…  -   Putting on and taking off regular lower body clothing?: A Lot  -   Bathing (including washing, rinsing, drying)?: A Little  -   Toileting, which includes using toilet, bedpan or urinal? : A Little  -   Putting on and taking off regular upper bod

## 2021-07-16 VITALS
SYSTOLIC BLOOD PRESSURE: 97 MMHG | RESPIRATION RATE: 16 BRPM | HEART RATE: 78 BPM | TEMPERATURE: 98 F | DIASTOLIC BLOOD PRESSURE: 61 MMHG | BODY MASS INDEX: 29 KG/M2 | WEIGHT: 185.63 LBS | OXYGEN SATURATION: 94 %

## 2021-07-16 LAB
GLUCOSE BLDC GLUCOMTR-MCNC: 180 MG/DL (ref 70–99)
GLUCOSE BLDC GLUCOMTR-MCNC: 293 MG/DL (ref 70–99)

## 2021-07-16 PROCEDURE — 99239 HOSP IP/OBS DSCHRG MGMT >30: CPT | Performed by: HOSPITALIST

## 2021-07-16 PROCEDURE — 99232 SBSQ HOSP IP/OBS MODERATE 35: CPT | Performed by: INTERNAL MEDICINE

## 2021-07-16 RX ORDER — PEN NEEDLE, DIABETIC 32GX 5/32"
NEEDLE, DISPOSABLE MISCELLANEOUS
Qty: 200 EACH | Refills: 0 | Status: SHIPPED | OUTPATIENT
Start: 2021-07-16

## 2021-07-16 RX ORDER — BLOOD-GLUCOSE METER
KIT MISCELLANEOUS
Qty: 1 KIT | Refills: 0 | Status: SHIPPED | OUTPATIENT
Start: 2021-07-16

## 2021-07-16 RX ORDER — ACETAMINOPHEN AND CODEINE PHOSPHATE 300; 30 MG/1; MG/1
1 TABLET ORAL EVERY 4 HOURS PRN
Qty: 15 TABLET | Refills: 0 | Status: SHIPPED | OUTPATIENT
Start: 2021-07-16

## 2021-07-16 RX ORDER — PSEUDOEPHEDRINE HCL 30 MG
100 TABLET ORAL 2 TIMES DAILY
Qty: 30 CAPSULE | Refills: 0 | Status: SHIPPED | OUTPATIENT
Start: 2021-07-16

## 2021-07-16 NOTE — CONSULTS
NEUROSURGERY CONSULTATION - DR. VILLALOBOS    HPI:  Patient is a 66year old male who was admitted following a slip and fall in the bathtub.   Patient describes that he was trying to get up out of the bathtub and couldn't pull himself up so went onto his hands [Gabapent*    RASH, OTHER (SEE COMMENTS)  Sulfamethoxazole W/*    OTHER (SEE COMMENTS)  Bactrim                 RASH  Dairy Products          SWELLING    Comment:Fresh cream/milk  Darvocet [Propoxyph*    RASH  Insects                 OTHER (SEE COMMENTS) stenosis  MRI reviewed shows moderate stenosis L4-S1   He is neurologically stable on examination. Patient would benefit from lumbar decompressive surgery via L4-S1 at some point. Continue PT/OT for now.     Thank you

## 2021-07-16 NOTE — OCCUPATIONAL THERAPY NOTE
OCCUPATIONAL THERAPY TREATMENT NOTE - INPATIENT        Room Number: 552/552-A                Problem List  Principal Problem:    Hip injury, right, initial encounter  Active Problems:    Hyperglycemia      OCCUPATIONAL THERAPY ASSESSMENT     LIZZY rosa could get up on his own, however explained to pt the need to call staff for assistance at this time to reduce risk of falls. The RW was not left in front of pt. Pt was provided with call light and all needs met.      It is recommended that pt have 24 hr sup bedpan or urinal? : A Little  -   Putting on and taking off regular upper body clothing?: A Little  -   Taking care of personal grooming such as brushing teeth?: None  -   Eating meals?: None    AM-PAC Score:  Score: 20  Approx Degree of Impairment: 38.32%

## 2021-07-16 NOTE — PLAN OF CARE
IV removed. Discharge papers reviewed with patient, all questions answered. Understands needs to follow up with new PCP and neurosurgery. Scripts given to patient.

## 2021-07-16 NOTE — PLAN OF CARE
Problem: Patient Centered Care  Goal: Patient preferences are identified and integrated in the patient's plan of care  Description: Interventions:  - What would you like us to know as we care for you?  I sty in hotel currently   Problem: Patient/Family Go

## 2021-07-16 NOTE — DISCHARGE SUMMARY
[facility]  Discharge Summary     Renetta Jimenez ROSALVA  : 3/21/1950    Status: Inpatient  Day #: 1    Attending: Bibiana Betts MD  PCP: None Pcp     Date of Admission:  2021  Date of Discharge:  2021     Hospital Discharge Diagnoses take metoprolol anymore  -f/u cardiologist     Other:  -HL  -CAD  -ischemic CM  -s/p ICD     Consultants  Chat With All Active Members    Provider Role Specialty    Eulalia Byrnes MD  Consulting Physician  Mylene Day MD  Consulting Physic lidocaine-menthol 4-1 % Ptch      Place 1 patch onto the skin daily.    Quantity: 30 patch  Refills: 0        CONTINUE taking these medications      Instructions Prescription details   ACAI LEPE OR       Refills: 0     amiodarone HCl 200 MG Tabs  Commonl 0     NON FORMULARY      Magnesium Orotate   Refills: 0     NON FORMULARY      Matcha Powder   Refills: 0     NON FORMULARY      Plant Enzymes with PU5OD-21 - Enriching   Refills: 0     NON FORMULARY      Pure Energy Superfood   Refills: 0     NON FORMULAR Sulfate HFA      Inhale 2 puffs into the lungs every 6 (six) hours as needed for Wheezing.    Refills: 0     QUERCETIN OR       Refills: 0     RED YEAST RICE OR       Refills: 0     SAW PALMETTO CONCENTRATE OR       Refills: 0     SELENIUM ER OR       Refil stenosis    Lace+ Score: 71  59-90 High Risk  29-58 Medium Risk  0-28   Low Risk. TCM Follow-Up Recommendation:  LACE > 58:  High Risk of readmission after discharge from the hospital.        I spent >30 minutes on this discharge including counseling pat

## 2021-08-02 ENCOUNTER — PATIENT OUTREACH (OUTPATIENT)
Dept: CASE MANAGEMENT | Age: 71
End: 2021-08-02

## 2021-08-04 NOTE — PROGRESS NOTES
I have made multiple attempts to reach Pt with no call back, Closing encounter.      3rd attempt, unable to LVM, went to fast busy

## 2021-08-26 ENCOUNTER — TELEPHONE (OUTPATIENT)
Dept: CARDIOLOGY | Age: 71
End: 2021-08-26

## 2021-08-26 RX ORDER — CYCLOBENZAPRINE HCL 5 MG
TABLET ORAL
Qty: 30 TABLET | OUTPATIENT
Start: 2021-08-26

## 2021-08-26 RX ORDER — FUROSEMIDE 20 MG/1
20 TABLET ORAL 2 TIMES DAILY
Qty: 180 TABLET | Refills: 0 | Status: SHIPPED | OUTPATIENT
Start: 2021-08-26

## 2021-09-03 ENCOUNTER — APPOINTMENT (OUTPATIENT)
Dept: CARDIOLOGY | Age: 71
End: 2021-09-03

## 2021-09-10 ENCOUNTER — TELEPHONE (OUTPATIENT)
Dept: CARDIOLOGY | Age: 71
End: 2021-09-10

## 2021-10-22 ENCOUNTER — HOSPITAL ENCOUNTER (EMERGENCY)
Facility: HOSPITAL | Age: 71
Discharge: HOME | End: 2021-10-22
Attending: EMERGENCY MEDICINE
Payer: MEDICARE

## 2021-10-22 ENCOUNTER — APPOINTMENT (OUTPATIENT)
Dept: GENERAL RADIOLOGY | Facility: HOSPITAL | Age: 71
End: 2021-10-22
Attending: EMERGENCY MEDICINE
Payer: MEDICARE

## 2021-10-22 VITALS
OXYGEN SATURATION: 97 % | BODY MASS INDEX: 30.13 KG/M2 | TEMPERATURE: 98 F | HEART RATE: 63 BPM | RESPIRATION RATE: 15 BRPM | WEIGHT: 192 LBS | DIASTOLIC BLOOD PRESSURE: 91 MMHG | SYSTOLIC BLOOD PRESSURE: 163 MMHG | HEIGHT: 67 IN

## 2021-10-22 DIAGNOSIS — S46.912A STRAIN OF LEFT SHOULDER, INITIAL ENCOUNTER: Primary | ICD-10-CM

## 2021-10-22 PROCEDURE — 73060 X-RAY EXAM OF HUMERUS: CPT | Performed by: EMERGENCY MEDICINE

## 2021-10-22 PROCEDURE — 73030 X-RAY EXAM OF SHOULDER: CPT | Performed by: EMERGENCY MEDICINE

## 2021-10-22 PROCEDURE — 93005 ELECTROCARDIOGRAM TRACING: CPT

## 2021-10-22 PROCEDURE — 93010 ELECTROCARDIOGRAM REPORT: CPT | Performed by: EMERGENCY MEDICINE

## 2021-10-22 PROCEDURE — 99284 EMERGENCY DEPT VISIT MOD MDM: CPT

## 2021-10-22 NOTE — ED QUICK NOTES
Patient provided with discharge instructions. Verbalized understanding for plan of care at home and follow up. All questions/ concerns addressed prior to discharge. [facility] set up for pt. ETA 0600. Pt in waiting room waiting on ride.

## 2021-10-22 NOTE — CM/SW NOTE
Nina Narvaez RN confirmed pt's home address correct on the face sheet and arranged [facility] for discharge transportation to pt's home address. [employer] ETA 0600 per [employer]. [employer] will be covered by pt's secondary insurance - Medicaid.  ZULMA completed P

## 2021-10-22 NOTE — ED PROVIDER NOTES
Patient Seen in: [facility] Emergency Department      History   Patient presents with:  Pain    Stated Complaint: left shoulder pain    Subjective:   HPI    Patient is a 48-year-old male who arrives by EMS for left shoulder pain x2 to 3 days.   Brooke Ferro Yes      Comment: 10-12 drinks/yr    Drug use: No             Review of Systems    Positive for stated complaint: left shoulder pain  Other systems are as noted in HPI. Constitutional and vital signs reviewed.       All other systems reviewed and negative results. He never completed outpatient bone scan. No evidence of bone mets on today's xray. Possible muscle strain or rotator cuff injury given location of his pains in shoulder. Safe for discharge with f/u.                           Disposition and Plan

## 2021-10-22 NOTE — ED INITIAL ASSESSMENT (HPI)
Patient to Er from home via EMS with c/o left should pain down to left elbow starting about 3 days ago. Patient denies recent trauma, chest pain, Shortness of breath. 4 baby aspirin given by EMS. +cms distally.

## 2021-11-24 ENCOUNTER — HOSPITAL ENCOUNTER (OUTPATIENT)
Dept: NUCLEAR MEDICINE | Facility: HOSPITAL | Age: 71
Discharge: HOME | End: 2021-11-24
Attending: HOSPITALIST
Payer: MEDICARE

## 2021-11-24 DIAGNOSIS — M89.9 LESION OF BONE OF LUMBOSACRAL SPINE: ICD-10-CM

## 2021-11-24 PROCEDURE — 78306 BONE IMAGING WHOLE BODY: CPT | Performed by: HOSPITALIST

## 2021-11-25 ENCOUNTER — E-ADVICE (OUTPATIENT)
Dept: CARDIOLOGY | Age: 71
End: 2021-11-25

## 2021-12-01 ENCOUNTER — E-ADVICE (OUTPATIENT)
Dept: CARDIOLOGY | Age: 71
End: 2021-12-01

## 2021-12-02 ENCOUNTER — IMMUNIZATION (OUTPATIENT)
Dept: LAB | Facility: HOSPITAL | Age: 71
End: 2021-12-02
Attending: EMERGENCY MEDICINE
Payer: MEDICARE

## 2021-12-02 DIAGNOSIS — Z23 NEED FOR VACCINATION: Primary | ICD-10-CM

## 2021-12-02 PROCEDURE — 0004A SARSCOV2 VAC 30MCG/0.3ML IM: CPT

## 2021-12-03 ENCOUNTER — HOSPITAL ENCOUNTER (INPATIENT)
Facility: HOSPITAL | Age: 71
LOS: 7 days | Discharge: HOME HEALTH | DRG: 552 | End: 2021-12-10
Attending: EMERGENCY MEDICINE | Admitting: INTERNAL MEDICINE
Payer: MEDICARE

## 2021-12-03 DIAGNOSIS — R77.8 ELEVATED TROPONIN: ICD-10-CM

## 2021-12-03 DIAGNOSIS — R26.2 UNABLE TO WALK: ICD-10-CM

## 2021-12-03 DIAGNOSIS — E11.65 HYPERGLYCEMIA DUE TO DIABETES MELLITUS (HCC): ICD-10-CM

## 2021-12-03 DIAGNOSIS — R53.1 WEAKNESS GENERALIZED: Primary | ICD-10-CM

## 2021-12-03 DIAGNOSIS — R42 DIZZINESS: ICD-10-CM

## 2021-12-03 DIAGNOSIS — R29.6 MULTIPLE FALLS: ICD-10-CM

## 2021-12-03 PROCEDURE — 93010 ELECTROCARDIOGRAM REPORT: CPT | Performed by: EMERGENCY MEDICINE

## 2021-12-03 PROCEDURE — 93005 ELECTROCARDIOGRAM TRACING: CPT

## 2021-12-03 PROCEDURE — 85730 THROMBOPLASTIN TIME PARTIAL: CPT | Performed by: EMERGENCY MEDICINE

## 2021-12-03 PROCEDURE — 99285 EMERGENCY DEPT VISIT HI MDM: CPT

## 2021-12-03 PROCEDURE — 81001 URINALYSIS AUTO W/SCOPE: CPT | Performed by: EMERGENCY MEDICINE

## 2021-12-03 PROCEDURE — 80048 BASIC METABOLIC PNL TOTAL CA: CPT | Performed by: EMERGENCY MEDICINE

## 2021-12-03 PROCEDURE — 82962 GLUCOSE BLOOD TEST: CPT

## 2021-12-03 PROCEDURE — 84484 ASSAY OF TROPONIN QUANT: CPT | Performed by: EMERGENCY MEDICINE

## 2021-12-03 PROCEDURE — 96374 THER/PROPH/DIAG INJ IV PUSH: CPT

## 2021-12-03 PROCEDURE — 80061 LIPID PANEL: CPT | Performed by: EMERGENCY MEDICINE

## 2021-12-03 PROCEDURE — 85025 COMPLETE CBC W/AUTO DIFF WBC: CPT | Performed by: EMERGENCY MEDICINE

## 2021-12-03 PROCEDURE — 83036 HEMOGLOBIN GLYCOSYLATED A1C: CPT | Performed by: HOSPITALIST

## 2021-12-03 PROCEDURE — 85610 PROTHROMBIN TIME: CPT | Performed by: EMERGENCY MEDICINE

## 2021-12-03 RX ORDER — CYCLOBENZAPRINE HCL 5 MG
5 TABLET ORAL 3 TIMES DAILY PRN
COMMUNITY

## 2021-12-03 RX ORDER — ACETAMINOPHEN 325 MG/1
650 TABLET ORAL EVERY 6 HOURS PRN
Status: DISCONTINUED | OUTPATIENT
Start: 2021-12-03 | End: 2021-12-10

## 2021-12-03 RX ORDER — CYCLOBENZAPRINE HCL 5 MG
5 TABLET ORAL 3 TIMES DAILY PRN
Status: DISCONTINUED | OUTPATIENT
Start: 2021-12-03 | End: 2021-12-10

## 2021-12-03 RX ORDER — METOCLOPRAMIDE HYDROCHLORIDE 5 MG/ML
5 INJECTION INTRAMUSCULAR; INTRAVENOUS EVERY 8 HOURS PRN
Status: DISCONTINUED | OUTPATIENT
Start: 2021-12-03 | End: 2021-12-10

## 2021-12-03 RX ORDER — DEXTROSE MONOHYDRATE 25 G/50ML
50 INJECTION, SOLUTION INTRAVENOUS
Status: DISCONTINUED | OUTPATIENT
Start: 2021-12-03 | End: 2021-12-10

## 2021-12-03 RX ORDER — MECLIZINE HYDROCHLORIDE 25 MG/1
25 TABLET ORAL ONCE
Status: COMPLETED | OUTPATIENT
Start: 2021-12-03 | End: 2021-12-03

## 2021-12-03 RX ORDER — ONDANSETRON 2 MG/ML
4 INJECTION INTRAMUSCULAR; INTRAVENOUS ONCE
Status: COMPLETED | OUTPATIENT
Start: 2021-12-03 | End: 2021-12-03

## 2021-12-03 RX ORDER — ONDANSETRON 2 MG/ML
4 INJECTION INTRAMUSCULAR; INTRAVENOUS EVERY 6 HOURS PRN
Status: DISCONTINUED | OUTPATIENT
Start: 2021-12-03 | End: 2021-12-10

## 2021-12-03 NOTE — ED QUICK NOTES
Called [facility] to inquire about image ready form being filled out. \"pacemaker nurse is in a procedure. \" Information given to call Caryn Ho RN back.

## 2021-12-03 NOTE — ED QUICK NOTES
Notified [employer]  to have pt pacemaker interrupted for MRI procedure. Spoke with Maryann Osuna and [place] [employer].  Jose De Jesus Lorenzana is paging a rep to schedule procedure

## 2021-12-03 NOTE — ED QUICK NOTES
Leo Roe, inpatient RN, on waiting for order form. She stated her understanding. Form sent with pt to floor.

## 2021-12-03 NOTE — CONSULTS
Cardiology Consult Note    Renetta Jimenez III Patient Status:  Inpatient    3/21/1950 MRN [id number]   Location [facility] 3W/SW Attending Hossein Gomez MD   Hosp Day # 0 PCP None Pcp     74-year-old male presents with symptoms of lower ex Chronic anticoagulation 6/28/2017   • CKD (chronic kidney disease) stage 4, GFR 15-29 ml/min (HCC)    • Diabetes (HCC)    • Essential hypertension    • Finger, superficial foreign body (splinter), without major open wound, infected    • Heart attack ([facility] [address] 1700    Physical Exam:     General: NAD  HEENT: Normocephalic, anicteric sclera, neck supple. Neck: No JVD, carotids 2+, no bruits. Cardiac: Regular rate and rhythm.  S1, S2 normal. No murmur, pericardial rub, S3.  Lungs: Clear without wheezes, rales, rho

## 2021-12-03 NOTE — PLAN OF CARE
Patient alert and oriented x4 on r/a. MRI STAT ordered, pacemaker needs to be interrupted.  MRI tech called this RN to communicate that the [facility] would not be able to come interrupt the pacemaker until Tuesday 12/17 at 10:30 AM. MD Mary Kay Villarreal pulses, skin color and temperature  - Assess for signs of decreased coronary artery perfusion - ex.  Angina  - Evaluate fluid balance, assess for edema, trend weights  Outcome: Progressing  Goal: Absence of cardiac arrhythmias or at baseline  Description: I

## 2021-12-03 NOTE — ED QUICK NOTES
Faxed Cardiology order form to [address] [place] [address] at fax number [phone].  Requesting form to be filled out and faxed back

## 2021-12-03 NOTE — ED INITIAL ASSESSMENT (HPI)
C/O increased weakness over the past few weeks. Unable to walk today, Chronic pain to back and legs.  +positional dizziness w/ nausea

## 2021-12-03 NOTE — H&P
[facility] Hospitalist H&P       CC: Patient presents with:  Fatigue       PCP: None Pcp    Date of Admission: 12/3/2021 12:21 PM    ASSESSMENT / PLAN:       Mr. Brii Mckee is a 65 yo M with PMH of DM2, HTN, CAD, CKD, lumbar stenosis and L1 lesion who presented wit leg weakness and pain for the past 6 months. He was hospitalized at [facility] in July 2021, had an MRI at that time that showed an L1 lesion, also had lumbar stenosis.  Was seen by Britt Negron and recommended to have bone scan and repeat MRI as outpatient then follow-up COMMENTS)  Sulfamethoxazole W/*    OTHER (SEE COMMENTS)  Bactrim                 RASH  Dairy Products          SWELLING    Comment:Fresh cream/milk  Darvocet [Propoxyph*    RASH  Insects                 OTHER (SEE COMMENTS)    Comment:Cockroaches per aller WBC 8.2 12/03/2021    HGB 15.4 12/03/2021    HCT 47.4 12/03/2021    .0 12/03/2021    CREATSERUM 2.17 12/03/2021    BUN 23 12/03/2021     12/03/2021    K  12/03/2021      Comment:      Test not reported due to hemolysis.   Test reordered by Mague Jimenez

## 2021-12-03 NOTE — ED QUICK NOTES
Orders for admission, patient is aware of plan and ready to go upstairs.  Any questions, please call ED LIZZY ARAUJO/Misti  at extension [phone]  Type of COVID test sent:  COVID Suspicion level: Low    Titratable drug(s) infusing:  Rate:    LOC at time of transpo Deandreaglar KwikPen 100 units/mL subcutaneous solution: 17 unit(s) subcutaneous 2 times a day  Benadryl 25 mg oral capsule: 1 cap(s) orally once a day (at bedtime), As Needed  DilTIAZem (Eqv-Cardizem CD) 300 mg/24 hours oral capsule, extended release: 1 cap(s) orally once a day  doxycycline hyclate 100 mg oral capsule: 1 cap(s) orally 2 times a day x 21 days total, started on 3/11/2021  latanoprost 0.005% ophthalmic solution: 1 drop(s) to each affected eye once a day (in the evening)  Lipitor 40 mg oral tablet: 1 tab(s) orally once a day  triamcinolone 0.1% topical cream: Apply topically to affected area 2 times a day  Vitamin D3 1000 intl units (25 mcg) oral capsule: 2 cap(s) orally once a day  Zaditor 0.025% ophthalmic solution: 1 drop(s) to each affected eye 2 times a day   DilTIAZem (Eqv-Cardizem CD) 300 mg/24 hours oral capsule, extended release: 1 cap(s) orally once a day  fluocinonide 0.05% topical cream: 1 application topically 2 times a day until 4/2/21. She needs to stop for one week in order to use again.  gabapentin 300 mg oral capsule: 1 cap(s) orally every 8 hours  glucose 40% oral gel:  orally   glucose 50% intravenous solution: 50 milliliter(s) intravenous once  glucose 50% intravenous solution: 25 milliliter(s) intravenous once  glucose 50% intravenous solution: 50 milliliter(s) intravenous once  hydrOXYzine hydrochloride 25 mg oral tablet: 1 tab(s) orally every 6 hours, As needed, Itching  insulin glargine: 35 unit(s) subcutaneous once a day (at bedtime)  insulin lispro 100 units/mL injectable solution: 30 unit(s) injectable 3 times a day before meals  latanoprost 0.005% ophthalmic solution: 1 drop(s) to each affected eye once a day (in the evening)  Lipitor 40 mg oral tablet: 1 tab(s) orally once a day  pantoprazole 40 mg oral delayed release tablet: 1 tab(s) orally once a day (before a meal) while on prednisone.  sulfamethoxazole-trimethoprim 800 mg-160 mg oral tablet: 1 tab(s) orally 3 times a week M/W/F while on prednisone.  triamcinolone 0.1% topical ointment: 1 application topically 2 times a day until 3/31. She needs to stop for one week in order to use again.  Vitamin D3 1000 intl units (25 mcg) oral capsule: 2 cap(s) orally once a day  Zaditor 0.025% ophthalmic solution: 1 drop(s) to each affected eye 2 times a day   DilTIAZem (Eqv-Cardizem CD) 300 mg/24 hours oral capsule, extended release: 1 cap(s) orally once a day  enoxaparin: 40 milligram(s)  once a day  fluocinonide 0.05% topical cream: 1 application topically 2 times a day until 4/2/21. She needs to stop for one week in order to use again.  gabapentin 300 mg oral capsule: 1 cap(s) orally every 8 hours  glucose 40% oral gel:  orally   glucose 50% intravenous solution: 50 milliliter(s) intravenous once  glucose 50% intravenous solution: 25 milliliter(s) intravenous once  glucose 50% intravenous solution: 50 milliliter(s) intravenous once  hydrOXYzine hydrochloride 25 mg oral tablet: 1 tab(s) orally every 6 hours, As needed, Itching  insulin glargine: 35 unit(s) subcutaneous once a day (at bedtime)  insulin lispro 100 units/mL injectable solution: 30 unit(s) injectable 3 times a day before meals  latanoprost 0.005% ophthalmic solution: 1 drop(s) to each affected eye once a day (in the evening)  Lipitor 40 mg oral tablet: 1 tab(s) orally once a day  pantoprazole 40 mg oral delayed release tablet: 1 tab(s) orally once a day (before a meal) while on prednisone.  sulfamethoxazole-trimethoprim 800 mg-160 mg oral tablet: 1 tab(s) orally 3 times a week M/W/F while on prednisone.  triamcinolone 0.1% topical ointment: 1 application topically 2 times a day until 3/31. She needs to stop for one week in order to use again.  Vitamin D3 1000 intl units (25 mcg) oral capsule: 2 cap(s) orally once a day  Zaditor 0.025% ophthalmic solution: 1 drop(s) to each affected eye 2 times a day   DilTIAZem (Eqv-Cardizem CD) 300 mg/24 hours oral capsule, extended release: 1 cap(s) orally once a day  enoxaparin: 40 milligram(s)  once a day  fluocinonide 0.05% topical cream: 1 application topically 2 times a day until 4/2/21. She needs to stop for one week in order to use again.  gabapentin 300 mg oral capsule: 1 cap(s) orally every 8 hours  hydrOXYzine hydrochloride 25 mg oral tablet: 1 tab(s) orally every 6 hours, As needed, Itching  insulin glargine: 35 unit(s) subcutaneous once a day (at bedtime)  insulin lispro 100 units/mL injectable solution: 30 unit(s) injectable 3 times a day before meals  latanoprost 0.005% ophthalmic solution: 1 drop(s) to each affected eye once a day (in the evening)  Lipitor 40 mg oral tablet: 1 tab(s) orally once a day  pantoprazole 40 mg oral delayed release tablet: 1 tab(s) orally once a day (before a meal) while on prednisone.  sulfamethoxazole-trimethoprim 800 mg-160 mg oral tablet: 1 tab(s) orally 3 times a week M/W/F while on prednisone.  triamcinolone 0.1% topical ointment: 1 application topically 2 times a day until 3/31. She needs to stop for one week in order to use again.  Vitamin D3 1000 intl units (25 mcg) oral capsule: 2 cap(s) orally once a day  Zaditor 0.025% ophthalmic solution: 1 drop(s) to each affected eye 2 times a day   DilTIAZem (Eqv-Cardizem CD) 300 mg/24 hours oral capsule, extended release: 1 cap(s) orally once a day  enoxaparin: 40 milligram(s)  once a day  fluocinonide 0.05% topical cream: 1 application topically 2 times a day until 4/2/21. She needs to stop for one week in order to use again.  gabapentin 300 mg oral capsule: 1 cap(s) orally every 8 hours  hydrOXYzine hydrochloride 25 mg oral tablet: 1 tab(s) orally every 6 hours, As needed, Itching  insulin glargine: 35 unit(s) subcutaneous once a day (at bedtime)  insulin lispro 100 units/mL injectable solution: 32 unit(s) injectable 3 times a day  latanoprost 0.005% ophthalmic solution: 1 drop(s) to each affected eye once a day (in the evening)  Lipitor 40 mg oral tablet: 1 tab(s) orally once a day  pantoprazole 40 mg oral delayed release tablet: 1 tab(s) orally once a day (before a meal) while on prednisone.  sulfamethoxazole-trimethoprim 800 mg-160 mg oral tablet: 1 tab(s) orally 3 times a week M/W/F while on prednisone.  triamcinolone 0.1% topical ointment: 1 application topically 2 times a day until 3/31. She needs to stop for one week in order to use again.  Vitamin D3 1000 intl units (25 mcg) oral capsule: 2 cap(s) orally once a day  Zaditor 0.025% ophthalmic solution: 1 drop(s) to each affected eye 2 times a day

## 2021-12-04 ENCOUNTER — APPOINTMENT (OUTPATIENT)
Dept: CV DIAGNOSTICS | Facility: HOSPITAL | Age: 71
DRG: 552 | End: 2021-12-04
Attending: INTERNAL MEDICINE
Payer: MEDICARE

## 2021-12-04 PROCEDURE — 82962 GLUCOSE BLOOD TEST: CPT

## 2021-12-04 PROCEDURE — 97162 PT EVAL MOD COMPLEX 30 MIN: CPT

## 2021-12-04 PROCEDURE — 85027 COMPLETE CBC AUTOMATED: CPT | Performed by: HOSPITALIST

## 2021-12-04 PROCEDURE — 97166 OT EVAL MOD COMPLEX 45 MIN: CPT

## 2021-12-04 PROCEDURE — 97530 THERAPEUTIC ACTIVITIES: CPT

## 2021-12-04 PROCEDURE — 85610 PROTHROMBIN TIME: CPT | Performed by: HOSPITALIST

## 2021-12-04 PROCEDURE — 93306 TTE W/DOPPLER COMPLETE: CPT | Performed by: INTERNAL MEDICINE

## 2021-12-04 PROCEDURE — 80048 BASIC METABOLIC PNL TOTAL CA: CPT | Performed by: HOSPITALIST

## 2021-12-04 PROCEDURE — 97535 SELF CARE MNGMENT TRAINING: CPT

## 2021-12-04 RX ORDER — DOCUSATE SODIUM 100 MG/1
100 CAPSULE, LIQUID FILLED ORAL 2 TIMES DAILY
Status: DISCONTINUED | OUTPATIENT
Start: 2021-12-04 | End: 2021-12-10

## 2021-12-04 RX ORDER — AMIODARONE HYDROCHLORIDE 200 MG/1
200 TABLET ORAL DAILY
Status: DISCONTINUED | OUTPATIENT
Start: 2021-12-04 | End: 2021-12-10

## 2021-12-04 RX ORDER — LOSARTAN POTASSIUM 50 MG/1
50 TABLET ORAL DAILY
Refills: 11 | Status: DISCONTINUED | OUTPATIENT
Start: 2021-12-04 | End: 2021-12-10

## 2021-12-04 RX ORDER — POLYETHYLENE GLYCOL 3350 17 G/17G
17 POWDER, FOR SOLUTION ORAL DAILY
Status: DISCONTINUED | OUTPATIENT
Start: 2021-12-04 | End: 2021-12-10

## 2021-12-04 RX ORDER — TRAMADOL HYDROCHLORIDE 50 MG/1
50 TABLET ORAL EVERY 8 HOURS PRN
Status: DISCONTINUED | OUTPATIENT
Start: 2021-12-04 | End: 2021-12-10

## 2021-12-04 NOTE — ED PROVIDER NOTES
Patient Seen in: [facility] 3w/sw      History   Patient presents with:  Fatigue    Stated Complaint: Dizziness    Subjective:   HPI    The patient is a 14-year-old male with a history of hypertension, chronic kidney disease, coronary artery disea Dx in his agd 45s              Past Surgical History:   Procedure Laterality Date   • CABG  2007   • COLONOSCOPY  01/03/2011   • OTHER SURGICAL HISTORY      aortic aneurysm repair   • OTHER SURGICAL HISTORY  2/2011    reimplantation of bypass grafts   • [facility] Genitourinary:     Comments: Normal rectal tone and no saddle anesthesia  Musculoskeletal:         General: No tenderness (No calf or lower extremity muscle tenderness). Normal range of motion. Cervical back: Normal range of motion and neck supple. Cholesterol, Total 211 (*)     HDL Cholesterol 39 (*)     Triglycerides 248 (*)     LDL Cholesterol 128 (*)     VLDL 45 (*)     Non HDL Chol 172 (*)     All other components within normal limits   TROPONIN I - Abnormal; Notable for the following components discussed with [address] [place] [address] hospitalist and [facility] cardiology who has seen him in the past.  Patient continues to deny any chest pain vital signs remained stable.          MDM      Pulse Ox: 96%, Normal, room air    Cardiac Monitor: Pulse Readings from Last 1 Encounter

## 2021-12-04 NOTE — PLAN OF CARE
Patients complains of lower back pain. Pain managed with flexeril and tylenol. Alert, on room air, AV paced on tele, and ambulates with 1 assist with rolling chair. Plan for 2D echo today. Call light and belongings within reach.     Problem: Patient Iliana Varsha Absence of cardiac arrhythmias or at baseline  Description: INTERVENTIONS:  - Continuous cardiac monitoring, monitor vital signs, obtain 12 lead EKG if indicated  - Evaluate effectiveness of antiarrhythmic and heart rate control medications as ordered  - I

## 2021-12-04 NOTE — PROGRESS NOTES
[facility] Hospitalist Progress Note     CC: Hospital Follow up    PCP: None Pcp       Assessment/Plan:     Principal Problem:    Weakness generalized  Active Problems:    Elevated troponin    Dizziness    Multiple falls    Unable to walk    Mr. Brigido Greenberg is a 67 yo [phone]     Subjective:     No CP, SOB, or N/V. B/l leg pain and weakness is about the same. OBJECTIVE:    Blood pressure 122/84, pulse 89, temperature 98.2 °F (36.8 °C), temperature source Oral, resp.  rate 17, weight 178 lb 12.8 oz (81.1 kg), Sp CC       traMADol, glucose **OR** glucose-vitamin C **OR** dextrose **OR** glucose **OR** glucose-vitamin C, acetaminophen, ondansetron, metoclopramide, cyclobenzaprine

## 2021-12-04 NOTE — PROGRESS NOTES
[address]      Chief complaint:  Fall    Subjective:  Chronic back pain no chest pain or dyspnea    Objective:  /84 (BP Location: Right arm)   Pulse 89   Temp 98.2 °F (36.8 °C) (Oral)   Resp 17   Wt 178 lb 12.8 oz (81.1 kg)   S

## 2021-12-04 NOTE — OCCUPATIONAL THERAPY NOTE
OCCUPATIONAL THERAPY EVALUATION - INPATIENT     Room Number: 779/383-N  Evaluation Date: 12/4/2021  Type of Evaluation: Initial       Physician Order: IP Consult to Occupational Therapy  Reason for Therapy: ADL/IADL Dysfunction and Discharge Planning    OC t/fs before d/c home alone. DISCHARGE RECOMMENDATIONS  OT Discharge Recommendations: Sub-acute rehabilitation  OT Device Recommendations: TBD    PLAN  OT Treatment Plan: Balance activities; Energy conservation/work simplification techniques;ADL training an extended stay hotel)  Home Layout: One level;Elevator  Lives With: Alone  Toilet and Equipment: Standard height toilet  Shower/Tub and Equipment: Tub-shower combo  Other Equipment: None  Hand Dominance: Right  Drives: No  Patient Regularly Uses: None Stand: Moderate assistance  Toilet Transfer: mod a for SPT to rolling commode;  Pt currently has lyel   Shower Transfer: NT  Chair Transfer: mod a for SPT    Bedroom Mobility: NT    BALANCE ASSESSMENT  Static Sitting: mod I  Dynamic Sitting: mod I  Static

## 2021-12-04 NOTE — PHYSICAL THERAPY NOTE
PHYSICAL THERAPY EVALUATION - INPATIENT     Room Number: 842/049-I  Evaluation Date: 12/4/2021  Type of Evaluation: Initial   Physician Order: PT Eval and Treat    Presenting Problem: L!1 lesion, BLE weakness     Reason for Therapy: Mobility Dysfunction a Problem List  Principal Problem:    Weakness generalized  Active Problems:    Elevated troponin    Dizziness    Multiple falls    Unable to walk      Past Medical History  Past Medical History:   Diagnosis Date   • Atherosclerosis of coronary artery ASSESSMENT  Ratin  Location: R hip  Management Techniques:  Activity promotion    COGNITION  · Overall Cognitive Status:  WFL - within functional limits    RANGE OF MOTION AND STRENGTH ASSESSMENT  Upper extremity ROM and strength are within functional l met by: 12/15/2021  Patient Goal Patient's self-stated goal is: to go home   Goal #1 Patient is able to demonstrate supine - sit EOB @ level: modified independent     Goal #1   Current Status    Goal #2 Patient is able to demonstrate transfers EOB to/from

## 2021-12-05 PROCEDURE — 83735 ASSAY OF MAGNESIUM: CPT | Performed by: INTERNAL MEDICINE

## 2021-12-05 PROCEDURE — 82962 GLUCOSE BLOOD TEST: CPT

## 2021-12-05 PROCEDURE — 85025 COMPLETE CBC W/AUTO DIFF WBC: CPT | Performed by: INTERNAL MEDICINE

## 2021-12-05 PROCEDURE — 80048 BASIC METABOLIC PNL TOTAL CA: CPT | Performed by: INTERNAL MEDICINE

## 2021-12-05 RX ORDER — ACETAMINOPHEN AND CODEINE PHOSPHATE 300; 30 MG/1; MG/1
1 TABLET ORAL EVERY 4 HOURS PRN
Status: DISCONTINUED | OUTPATIENT
Start: 2021-12-05 | End: 2021-12-10

## 2021-12-05 NOTE — PROGRESS NOTES
[facility] Hospitalist Progress Note     CC: Hospital Follow up    PCP: None Pcp       Assessment/Plan:     Principal Problem:    Weakness generalized  Active Problems:    Elevated troponin    Dizziness    Multiple falls    Unable to walk    Mr. Ab Hunter is a 67 yo number: [phone]     Subjective:     No CP, SOB, or N/V. B/l leg pain and weakness is about the same. Working with PT    OBJECTIVE:    Blood pressure (!) 147/98, pulse 94, temperature 98 °F (36.7 °C), temperature source Oral, resp.  rate 18, weight 180 ALPHOS, TBIL, DBIL, TPROT      Imaging:  No results found.       Meds:     • amiodarone  200 mg Oral Daily   • losartan  50 mg Oral Daily   • docusate sodium  100 mg Oral BID   • polyethylene glycol (PEG 3350)  17 g Oral Daily   • insulin detemir  10 Units

## 2021-12-05 NOTE — PROGRESS NOTES
[address]      Chief complaint:  Fall    Subjective:  Chronic back pain no chest pain or dyspnea  Patient had a transient episode of sparkling lights and vision changes that occurred when he changed from his reading glasses to his re biplane      method of disks. Dyskinesis of the apical myocardium.      Hypokinesis of the inferolateral myocardium. Doppler parameters      are consistent with abnormal left ventricular relaxation (grade      1 diastolic dysfunction).    2. Aortic valve: A of cardiac arrest status post BiV ICD 2017    Plan:   -We will have patient's CRT–D formerly [facility]) device checked to assess whether he is pacemaker dependent. It is unlikely he is pacemaker dependent.   If he is reprogramming of the device will be nee

## 2021-12-05 NOTE — PLAN OF CARE
Vital stable. Alert, on room air and ambulates with 1 assist with walker. Tramadol given for back pain. No complaints of burning when urinating and 1 small BM over night. Call light and belonging within reach.     Problem: Patient Centered Care  Goal: Deja arrhythmias or at baseline  Description: INTERVENTIONS:  - Continuous cardiac monitoring, monitor vital signs, obtain 12 lead EKG if indicated  - Evaluate effectiveness of antiarrhythmic and heart rate control medications as ordered  - Initiate emergency m assessment  - Modify environment to reduce risk of injury  - Provide assistive devices as appropriate  - Consider OT/PT consult to assist with strengthening/mobility  - Encourage toileting schedule  Outcome: Progressing

## 2021-12-05 NOTE — PLAN OF CARE
Patient up in chair, pain managed, one dizzy episode when changing glasses-resolved. Awaiting PPM interrogation for MRI from [address].      Problem: Patient Centered Care  Goal: Patient preferences are identified and integrated in the patient's solo Continuous cardiac monitoring, monitor vital signs, obtain 12 lead EKG if indicated  - Evaluate effectiveness of antiarrhythmic and heart rate control medications as ordered  - Initiate emergency measures for life threatening arrhythmias  - Monitor electro OT/PT consult to assist with strengthening/mobility  - Encourage toileting schedule  Outcome: Progressing

## 2021-12-06 PROCEDURE — 80048 BASIC METABOLIC PNL TOTAL CA: CPT | Performed by: INTERNAL MEDICINE

## 2021-12-06 PROCEDURE — 97530 THERAPEUTIC ACTIVITIES: CPT

## 2021-12-06 PROCEDURE — 85025 COMPLETE CBC W/AUTO DIFF WBC: CPT | Performed by: INTERNAL MEDICINE

## 2021-12-06 PROCEDURE — 82962 GLUCOSE BLOOD TEST: CPT

## 2021-12-06 PROCEDURE — 97112 NEUROMUSCULAR REEDUCATION: CPT

## 2021-12-06 PROCEDURE — 83735 ASSAY OF MAGNESIUM: CPT | Performed by: INTERNAL MEDICINE

## 2021-12-06 NOTE — PROGRESS NOTES
[facility] Hospitalist Progress Note     CC: Hospital Follow up    PCP: None Pcp       Assessment/Plan:     Principal Problem:    Weakness generalized  Active Problems:    Elevated troponin    Dizziness    Multiple falls    Unable to walk    Mr. Meridith Prader is a 65 yo number: [phone]     Subjective:     No CP, SOB, or N/V. B/l leg pain and weakness is about the same. Working with PT. [place] dizzy this am.    OBJECTIVE:    Blood pressure 122/83, pulse 65, temperature 97.5 °F (36.4 °C), temperature source Oral, resp. 141 140 140   K 4.5 4.1 4.8    106 105   CO2 32.0 30.0 29.0       No results for input(s): ALT, AST, ALB, AMYLASE, LIPASE, LDH in the last 168 hours. Invalid input(s): ALPHOS, TBIL, DBIL, TPROT      Imaging:  No results found.       Meds:     • ami

## 2021-12-06 NOTE — PLAN OF CARE
Problem: CARDIOVASCULAR - ADULT  Goal: Maintains optimal cardiac output and hemodynamic stability  Description: INTERVENTIONS:  - Monitor vital signs, rhythm, and trends  - Monitor for bleeding, hypotension and signs of decreased cardiac output  - Evaluate precautions as indicated by assessment.  - Educate pt/family on patient safety including physical limitations  - Instruct pt to call for assistance with activity based on assessment  - Modify environment to reduce risk of injury  - Provide assistive device

## 2021-12-06 NOTE — PHYSICAL THERAPY NOTE
PHYSICAL THERAPY TREATMENT NOTE - INPATIENT     Room Number: 338/925-L       Presenting Problem: L!1 lesion, BLE weakness       Problem List  Principal Problem:    Weakness generalized  Active Problems:    Elevated troponin    Dizziness    Multiple falls PAIN ASSESSMENT   Rating:  (No c/o pain offered)  Location: R hip  Management Techniques:  Activity promotion    BALANCE  Static Sitting: Fair +  Dynamic Sitting: Fair  Static Standing: Poor +  Dynamic Standing: Poor +    ACTIVITY TOLERANCE           BP EOB to/from [facility] at assistance level: modified independent with walker - rolling     Goal #2  Current Status Mod A with RW   Goal #3 Patient is able to ambulate 100 feet with assist device: walker - rolling at assistance level: modified independent   Goal #

## 2021-12-06 NOTE — PLAN OF CARE
Patient alert and oriented x4, saturating well on room air. Still having generalized weakness when ambulating in room with x1 assist with walker. Plan for MRI tomorrow after [employer] comes to assess pacemaker.  PT/OT came and worked with pt, recomm coronary artery perfusion - ex.  Angina  - Evaluate fluid balance, assess for edema, trend weights  Outcome: Progressing  Goal: Absence of cardiac arrhythmias or at baseline  Description: INTERVENTIONS:  - Continuous cardiac monitoring, monitor vital signs, including physical limitations  - Instruct pt to call for assistance with activity based on assessment  - Modify environment to reduce risk of injury  - Provide assistive devices as appropriate  - Consider OT/PT consult to assist with strengthening/mobilit

## 2021-12-06 NOTE — PROGRESS NOTES
CARDIOVASCULAR DISEASE CONSULT NOTE  Longitudinal Cardiologist: Dr. Sherie Peoples    Patient: Delia Ormond  MRN# [id number]  [address] [place] [address] Date: 12/3/2021 12:21 PM  Admit Diagnosis: Dizziness [R42]  Weakness generalized [R53.1]  Unable to walk [R26.2]  Elevated tr 12/03/21  1449 12/04/21  0719 12/05/21  0722 12/06/21  0630   HGB 15.4   < >  --  15.0 14.7 14.9   HCT 47.4   < >  --  46.8 43.9 45.5   WBC 8.2   < >  --  4.9 5.8 6.1   .0*   < >  --  108.0* 103.0* 102.0*   *   < >  --  184* 232* 184*   BUN 2 wall tenderness. LUNGS: Clear to auscultation with good aeration throughout, no rales, rhonchi or wheezing. HEART: Regular rhythm, normal heart sounds, 3/6 systolic murmur. ABDOMEN: Soft, non distended  EXTREMITIES:  No edema.   SKIN: Warm and dry  NEURO

## 2021-12-06 NOTE — CM/SW NOTE
12/06/21 1200   CM/SW Referral Data   Referral Source Social Work (self-referral)   Reason for Referral Discharge planning   Informant Patient   Pertinent Medical Hx   Does patient have an established PCP?  No   Patient Info   Patient's Home Environment

## 2021-12-06 NOTE — DIABETES ED
[facility]    Diabetes Education  Note    Gregory Douse III Patient Status:  Inpatient   3/21/1950 MRN [id number]  Location [facility] 3W/SW Attending Kate Leal MD  Hosp Day # 3 PCP None Pcp      Labs:    HGBA1C (%)   Date

## 2021-12-07 ENCOUNTER — APPOINTMENT (OUTPATIENT)
Dept: MRI IMAGING | Facility: HOSPITAL | Age: 71
DRG: 552 | End: 2021-12-07
Attending: EMERGENCY MEDICINE
Payer: MEDICARE

## 2021-12-07 PROCEDURE — 97530 THERAPEUTIC ACTIVITIES: CPT

## 2021-12-07 PROCEDURE — 82962 GLUCOSE BLOOD TEST: CPT

## 2021-12-07 PROCEDURE — 97116 GAIT TRAINING THERAPY: CPT

## 2021-12-07 PROCEDURE — 97535 SELF CARE MNGMENT TRAINING: CPT

## 2021-12-07 PROCEDURE — 85025 COMPLETE CBC W/AUTO DIFF WBC: CPT | Performed by: INTERNAL MEDICINE

## 2021-12-07 PROCEDURE — 80048 BASIC METABOLIC PNL TOTAL CA: CPT | Performed by: INTERNAL MEDICINE

## 2021-12-07 PROCEDURE — 72148 MRI LUMBAR SPINE W/O DYE: CPT | Performed by: EMERGENCY MEDICINE

## 2021-12-07 PROCEDURE — 83735 ASSAY OF MAGNESIUM: CPT | Performed by: INTERNAL MEDICINE

## 2021-12-07 NOTE — PLAN OF CARE
Pt received awake and alert. MRI of spine done. Able to ambulate with 1 assist and a walker. Continues to report lightheadedness both when seated and when ambulating. PT/OT recommending acute rehab at discharge. Educated to call for assistance.  Call light trend weights  Outcome: Progressing  Goal: Absence of cardiac arrhythmias or at baseline  Description: INTERVENTIONS:  - Continuous cardiac monitoring, monitor vital signs, obtain 12 lead EKG if indicated  - Evaluate effectiveness of antiarrhythmic and hea based on assessment  - Modify environment to reduce risk of injury  - Provide assistive devices as appropriate  - Consider OT/PT consult to assist with strengthening/mobility  - Encourage toileting schedule  Outcome: Progressing

## 2021-12-07 NOTE — PHYSICAL THERAPY NOTE
PHYSICAL THERAPY TREATMENT NOTE - INPATIENT     Room Number: 646/201-U       Presenting Problem: L!1 lesion, BLE weakness    Problem List  Principal Problem:    Weakness generalized  Active Problems:    Elevated troponin    Dizziness    Multiple falls    U training    SUBJECTIVE  Patient agreeable to therapy session    OBJECTIVE  Precautions: Spine    PAIN ASSESSMENT   Rating: Unable to rate  Location: right hip  Management Techniques: Activity promotion; Body mechanics;Repositioning    BALANCE device: walker - rolling at assistance level: modified independent   Goal #3   Current Status Min assist    Goal #4 Patient will negotiate one curb w/ assistive device and supervision   Goal #4   Current Status NT   Goal #5 Patient to demonstrate independe

## 2021-12-07 NOTE — IMAGING NOTE
1045  Patient to MRI holding room. [employer] pacemaker / ICD representative present to program patient's ICD to MRI safe mode.  VSS      1055  VS    /80  SPO2 96% ON RA  MRI SCAN BEGINS    1105  VS    /77  SPO2 95%    1115  V

## 2021-12-07 NOTE — CONSULTS
PHYSICAL MEDICINE AND REHABILITATION CONSULTATION     CC: Impaired mobility and ADL dysfunction secondary to back pain    HPI: This is a 65 y/o male with a PMH of DM2, HTN, CKD, CAD, lumbar stenosis and an L1 lesion, who presented to [facility] due TID CC  •  cyclobenzaprine (Flexeril) tab 5 mg, 5 mg, Oral, TID PRN        Allergies:    Ambien [Zolpidem]       HALLUCINATION  Neurontin [Gabapent*    RASH, OTHER (SEE COMMENTS)  Sulfamethoxazole W/*    OTHER (SEE COMMENTS)  Toprol Xl [Metoprol*    SWELLI Cancer Paternal Grandfather    • Heart Disease Paternal Grandmother    • Alcohol and Other Disorders Associated Father    • Diabetes Father    • Substance Abuse Brother    • Heart Disease Maternal Grandfather    • Heart Disease Maternal Grandmother    • He 181 lb 8 oz (82.3 kg)   SpO2 95%   BMI 28.43 kg/m²   General: elderly  male of  Normal Build   Head: Normocephalic, atraumatic, without obvious abnormality  Eyes: conjunctiva/lids without erythema/icterus, EOMI  E/N/T: No scars noted on bilateral ears, Lip bladder management, skin management. Physiatric medical oversight to monitor kidney function, cardiac function, pulmonary status, neurologic status, DVT prevention. ELOS 1  Week. Rehab potential is  good    DC goal is home alone at Mod I  Level.     Would b

## 2021-12-07 NOTE — OCCUPATIONAL THERAPY NOTE
OCCUPATIONAL THERAPY TREATMENT NOTE - INPATIENT        Room Number: 697/245-P    Problem List  Principal Problem:    Weakness generalized  Active Problems:    Elevated troponin    Dizziness    Multiple falls    Unable to walk      OCCUPATIONAL THERAPY ASSE ASSESSMENT  Rating: Unable to rate           ACTIVITY TOLERANCE  Fatigues quickly with bedside/in room activity      ACTIVITIES OF DAILY LIVING ASSESSMENT  AM-PAC ‘6-Clicks’ Inpatient Daily Activity Short Form  How much help from another person does the pa

## 2021-12-07 NOTE — PROGRESS NOTES
[facility] Hospitalist Progress Note     CC: Hospital Follow up    PCP: None Pcp       Assessment/Plan:     Principal Problem:    Weakness generalized  Active Problems:    Elevated troponin    Dizziness    Multiple falls    Unable to walk    Mr. Chandu Owens is a 67 yo Zackery Trujillo MD  [facility] Hospitalist  Answering Service number: [phone]     Subjective:     No CP, SOB, or N/V. B/l leg pain and weakness is about the same. Working with PT.  [place] dizzy this am.    OBJECTIVE:    Blood pressure (!) 133/93, pulse 89, temperature ALB, AMYLASE, LIPASE, LDH in the last 168 hours. Invalid input(s): ALPHOS, TBIL, DBIL, TPROT      Imaging:  No results found.       Meds:     • amiodarone  200 mg Oral Daily   • losartan  50 mg Oral Daily   • docusate sodium  100 mg Oral BID   • polyethy

## 2021-12-07 NOTE — PROGRESS NOTES
[facility] Progress Note    Zulema Garibays III Patient Status:  Inpatient    3/21/1950 MRN [id number]   Location [facility] 3W/SW Attending Georges Simms MD   Hosp Day # 4 PCP None Pcp     Subjective:  No acute events overni Physical Exam:       Constitutional: He is oriented to person, place, and time. He appears well-developed. No distress. Cardiovascular: Normal rate and regular rhythm. Murmur ( 4/6 systolic ejection murmur) heard. Edema not present.   Pul 12/4/2021  Study Conclusions   1. Left ventricle: The cavity size was normal. Wall thickness was      increased in a pattern of mild LVH. Systolic function was mildly      reduced.  The estimated ejection fraction was 45-50%, by biplane      method of disks

## 2021-12-07 NOTE — PLAN OF CARE
Patient continues to have right hip pain, though finds relief with tramadol. Patient is using a walker when ambulating. Frequent reminders/cues for proper use of walker. Plan for discharge is sub-acute rehab vs home.  Willards Goltry is planned to come se ex. Angina  - Evaluate fluid balance, assess for edema, trend weights  Outcome: Progressing  Goal: Absence of cardiac arrhythmias or at baseline  Description: INTERVENTIONS:  - Continuous cardiac monitoring, monitor vital signs, obtain 12 lead EKG if indic limitations  - Instruct pt to call for assistance with activity based on assessment  - Modify environment to reduce risk of injury  - Provide assistive devices as appropriate  - Consider OT/PT consult to assist with strengthening/mobility  - Encourage toil

## 2021-12-07 NOTE — CM/SW NOTE
10: 45AM  JACOB consulted w/ PT Meetal and OT Silvana - per Meetal, recommendation now changed to Acute Rehab for pt. JACOB messaged RN/Tootie and requested PMR consult be ordered for evaluation of pt. JACOB sent Acute Rehab referrals via [facility] for review.

## 2021-12-08 PROCEDURE — 83735 ASSAY OF MAGNESIUM: CPT | Performed by: INTERNAL MEDICINE

## 2021-12-08 PROCEDURE — 82962 GLUCOSE BLOOD TEST: CPT

## 2021-12-08 PROCEDURE — 85025 COMPLETE CBC W/AUTO DIFF WBC: CPT | Performed by: INTERNAL MEDICINE

## 2021-12-08 PROCEDURE — 80048 BASIC METABOLIC PNL TOTAL CA: CPT | Performed by: INTERNAL MEDICINE

## 2021-12-08 NOTE — PLAN OF CARE
Patient resting well. Per patient description, pain is not in hip tonight, it is his feet, however all pain is relived with prn tramadol. Plan for PMR consult for acute rehab and neurosurgery to see patient.        Problem: Patient Centered Care  Goal: Mei arrhythmias or at baseline  Description: INTERVENTIONS:  - Continuous cardiac monitoring, monitor vital signs, obtain 12 lead EKG if indicated  - Evaluate effectiveness of antiarrhythmic and heart rate control medications as ordered  - Initiate emergency m injury  - Provide assistive devices as appropriate  - Consider OT/PT consult to assist with strengthening/mobility  - Encourage toileting schedule  Outcome: Progressing

## 2021-12-08 NOTE — PROGRESS NOTES
[facility] Hospitalist Progress Note     CC: Hospital Follow up    PCP: None Pcp       Assessment/Plan:     Principal Problem:    Weakness generalized  Active Problems:    Elevated troponin    Dizziness    Multiple falls    Unable to walk    Mr. Thomas Avelar is a 65 yo reviewed.      Further recommendations pending patient's clinical course.   [facility] hospitalist to continue to follow patient while in house     Patient and/or patient's family given opportunity to ask questions and note understanding and agreeing with therapeu .0* 109.0* 110.0*         Recent Labs   Lab 12/06/21  0630 12/07/21  0702 12/08/21  0646   * 170* 249*   BUN 27* 31* 29*   CREATSERUM 2.23* 2.16* 2.25*   GFRAA 33* 34* 32*   GFRNAA 28* 30* 28*   CA 8.6 8.8 8.8    139 139   K 4.8 4.7 5 glucose **OR** glucose-vitamin C, acetaminophen, ondansetron, metoclopramide, cyclobenzaprine

## 2021-12-08 NOTE — CONSULTS
[facility]    Neurosurgery Consultation    Dianne Mini III Patient Status:  Inpatient    3/21/1950 MRN [id number]   Location [facility] 3W/SW Attending Anahy Ponce MD   Hosp Day # 5 PCP None Pcp     Date of Admission:  11 Thyroid Disorder Mother    • Cancer Paternal Grandfather    • Heart Disease Paternal Grandmother    • Alcohol and Other Disorders Associated Father    • Diabetes Father    • Substance Abuse Brother    • Heart Disease Maternal Grandfather    • Heart Disease acetaminophen (TYLENOL) tab 650 mg, 650 mg, Oral, Q6H PRN  •  ondansetron (ZOFRAN) injection 4 mg, 4 mg, Intravenous, Q6H PRN  •  metoclopramide (REGLAN) injection 5 mg, 5 mg, Intravenous, Q8H PRN  •  Insulin Aspart Pen (NOVOLOG) 100 UNIT/ML flexpen 1-7 Un spinal canal stenosis. 5. L1-L2 and L2-L3:  Degenerative changes, which do not result in significant neural compromise.   6. Stable 3.1 x 1.6 cm STIR hyperintense lesion within the left L1 vertebral body dating to April, 2017 lumbar spine MR from [facility] AT [facility]

## 2021-12-08 NOTE — PLAN OF CARE
Pt received awake and alert. Able to ambulate with standby and walker. Plan for spine surgery next week with Dr. Elijah Felix. Educated to call for assistance. Call light within reach.      Problem: Patient Centered Care  Goal: Patient preferences are identified a baseline  Description: INTERVENTIONS:  - Continuous cardiac monitoring, monitor vital signs, obtain 12 lead EKG if indicated  - Evaluate effectiveness of antiarrhythmic and heart rate control medications as ordered  - Initiate emergency measures for life t assistive devices as appropriate  - Consider OT/PT consult to assist with strengthening/mobility  - Encourage toileting schedule  Outcome: Progressing

## 2021-12-08 NOTE — CM/SW NOTE
09: 00AM  Per RN rounds, plan for neurosurgery sometime next week - potentially Monday or Wednesday. SW to further discuss DC Planning w/ pt closer to surgery and/or post op when DC needs are better determined.     12:00PM  Received update from RN/Dena -

## 2021-12-09 PROCEDURE — 85025 COMPLETE CBC W/AUTO DIFF WBC: CPT | Performed by: INTERNAL MEDICINE

## 2021-12-09 PROCEDURE — 82962 GLUCOSE BLOOD TEST: CPT

## 2021-12-09 PROCEDURE — 80048 BASIC METABOLIC PNL TOTAL CA: CPT | Performed by: INTERNAL MEDICINE

## 2021-12-09 PROCEDURE — 83735 ASSAY OF MAGNESIUM: CPT | Performed by: INTERNAL MEDICINE

## 2021-12-09 NOTE — PLAN OF CARE
Problem: Patient Centered Care  Goal: Patient preferences are identified and integrated in the patient's plan of care  Description: Interventions:  - What would you like us to know as we care for you?   and  by trade   - Provide time medications as ordered  - Initiate emergency measures for life threatening arrhythmias  - Monitor electrolytes and administer replacement therapy as ordered  Outcome: Progressing     Problem: Impaired Functional Mobility  Goal: Achieve highest/safest level

## 2021-12-09 NOTE — PLAN OF CARE
Problem: Patient Centered Care  Goal: Patient preferences are identified and integrated in the patient's plan of care  Description: Interventions:  - What would you like us to know as we care for you?   and  by trade   - Provide time medications as ordered  - Initiate emergency measures for life threatening arrhythmias  - Monitor electrolytes and administer replacement therapy as ordered  Outcome: Progressing     Problem: Impaired Functional Mobility  Goal: Achieve highest/safest level signs stable. Afebrile. Complained moderate back pain improved with Tramadol and flexeril for muscle cramps. Blood sugar was at 358 last night, MD notified, insulin coverage given. Call light within reach. Fall precautions in place.  Will continue to monit

## 2021-12-09 NOTE — PROGRESS NOTES
[facility] Hospitalist Progress Note     CC: Hospital Follow up    PCP: None Pcp       Assessment/Plan:     Principal Problem:    Weakness generalized  Active Problems:    Elevated troponin    Dizziness    Multiple falls    Unable to walk    Mr. Nevin Craft is a 65 yo °C)  Pulse:  [64-78] 73  Resp:  [17-18] 18  BP: (133-141)/(78-97) 133/94      Intake/Output:    Intake/Output Summary (Last 24 hours) at 12/9/2021 1153  Last data filed at 12/9/2021 0600  Gross per 24 hour   Intake 890 ml   Output —   Net 890 ml       Last neural foraminal with moderate to severe spinal canal stenosis. 3. L5-S1:  Severe bilateral neural foraminal with mild-to-moderate bilateral lateral recess stenosis. 4. L3-L4:  Moderate bilateral neural foraminal and mild spinal canal stenosis.  5. L1-L2 an

## 2021-12-09 NOTE — PHYSICAL THERAPY NOTE
Attempted physical therapy treatment. Per RN, patient complaining of neck pain today. Upon entering room, patient declined stating he wanted to rest due to his neck pain.  Encouraged patient to get out of bed to chair as tolerated for meals, patient with go

## 2021-12-10 VITALS
WEIGHT: 180.38 LBS | OXYGEN SATURATION: 96 % | BODY MASS INDEX: 28 KG/M2 | DIASTOLIC BLOOD PRESSURE: 104 MMHG | TEMPERATURE: 98 F | HEART RATE: 88 BPM | SYSTOLIC BLOOD PRESSURE: 149 MMHG | RESPIRATION RATE: 18 BRPM

## 2021-12-10 PROCEDURE — 97530 THERAPEUTIC ACTIVITIES: CPT

## 2021-12-10 PROCEDURE — 82962 GLUCOSE BLOOD TEST: CPT

## 2021-12-10 PROCEDURE — 97116 GAIT TRAINING THERAPY: CPT

## 2021-12-10 RX ORDER — TRAMADOL HYDROCHLORIDE 50 MG/1
50 TABLET ORAL EVERY 8 HOURS PRN
Qty: 20 TABLET | Refills: 0 | Status: SHIPPED | OUTPATIENT
Start: 2021-12-10

## 2021-12-10 RX ORDER — FUROSEMIDE 20 MG/1
20 TABLET ORAL 2 TIMES DAILY
COMMUNITY

## 2021-12-10 NOTE — CM/SW NOTE
12/10/21 1000   Discharge disposition   Expected discharge disposition Home-Health   Post Acute Care Provider   [facility])   Discharge transportation Private car     Per chart review, pt has DC order for today 12/10.     SW received update from PT

## 2021-12-10 NOTE — CM/SW NOTE
Received notice from PT/Meetal and Dr. Oliver Sayres - no longer plan for surgery at [facility] next week. SW met w/ pt in his room to discuss SNF. Pt is upset and adamant that he will return home and be fine. Per pt he is \"sick of these games. \" SW attempted to expl

## 2021-12-10 NOTE — DISCHARGE SUMMARY
[facility] Internal Medicine Discharge Summary   Patient ID:  Van Kaufman  [id number]  69 year old  3/21/1950    Admit date: 12/3/2021    Discharge date and time: 12/10/2021     Attending Physician: Shalom Lane MD     Primary Care Physician: None Pcp to around 2-2.7  - monitor     HTN  - BP stable  - hold irbesartan for now    Day of discharge Exam    12/10/21  0900   BP: (!) 149/104   Pulse: 88   Resp: 18   Temp: 97.6 °F (36.4 °C)       Exam on day of discharge:  Gen: No acute distress  CV: RRR  Lungs daily before meals. * Insulin Aspart Pen 100 UNIT/ML Sopn  Commonly known as: NOVOLOG     insulin detemir 100 UNIT/ML Sopn  Commonly known as: LEVEMIR  Inject 25 Units into the skin nightly.      Irbesartan 300 MG Tabs  Take 1 tablet (300 mg total) by m COMPARISON: CT ABDOMEN + PELVIS (CPT=74176), 8/27/2019, 7:48 PM.  MR RUPA, MRI SPINE LUMBAR (CPT=72148), 4/05/2017, 12:20 PM.  [facility] BONE SCAN WB (MWG=[id number]), 11/24/2021, 10:03 AM.  Doni Finnegan is no significant spinal canal stenosis, there is narrowing of the transverse thecal sac. Mild-to-moderate bilateral lateral recess stenosis. There is also severe bilateral neural foraminal stenosis. CONCLUSION:   1.  Advanced multilevel degenerat No abnormal uptake identified in the lumbar spine, in particular the L1 vertebral body. OTHER: Negative. CONCLUSION:  1. No uptake in the L1 vertebral body corresponding to MR finding seen previously.  2. Uptake in the left ribs consistent with pos normally. 3. Aortic root: The aortic root was dilated. ------------------------------------------------------------------- Study data: The previous study was not available, so comparison was made to the report of 08/18/2020. Study status:  Elective.  Pr catheter noted in right ventricle. Systolic function was normal. Pulmonic valve:   Not well visualized. The valve appears to be grossly normal.    Doppler: There was no evidence for stenosis. Trivial regurgitation.  Tricuspid valve:   Structurally kate LV E/e', medial                           6.9          -----------  LV e', average                            5.8   cm/sec -----------  LV E/e', average                          6            -----------   Ventricular septum                        Value Operative Procedures:         Total Time Coordinating Care: > than 30 minutes    Patient had opportunity to ask questions and state understand and agree with therapeutic plan as outlined      MD Champ Lundy  [phone]  12/10/2021  1

## 2021-12-10 NOTE — PHYSICAL THERAPY NOTE
PHYSICAL THERAPY TREATMENT NOTE - INPATIENT     Room Number: 941/989-M       Presenting Problem: L!1 lesion, BLE weakness    Problem List  Principal Problem:    Weakness generalized  Active Problems:    Elevated troponin    Dizziness    Multiple falls    U therapy recommending SALE[facility]. RN aware of patient status post session. DISCHARGE RECOMMENDATIONS  PT Discharge Recommendations: Acute rehabilitation     PLAN  PT Treatment Plan: Bed mobility; Body mechanics; Endurance; Energy conservation;Patient education;Gai is able to demonstrate supine - sit EOB @ level: modified independent     Goal #1   Current Status NT   Goal #2 Patient is able to demonstrate transfers EOB to/from [facility] at assistance level: modified independent with walker - rolling     Goal #2  Current St

## 2021-12-10 NOTE — PROGRESS NOTES
I discussed with the patient at length that I was unfortunately not able to accomodate him for surgery next week b/c OR would not be able to give me a room.  I mentioned the possibility of transfer or doing the surgery somewhere else or even one of my assoc

## 2021-12-10 NOTE — PLAN OF CARE
Problem: Patient Centered Care  Goal: Patient preferences are identified and integrated in the patient's plan of care  Description: Interventions:  - What would you like us to know as we care for you?   and  by trade   - Provide time medications as ordered  - Initiate emergency measures for life threatening arrhythmias  - Monitor electrolytes and administer replacement therapy as ordered  Outcome: Progressing     Problem: Impaired Functional Mobility  Goal: Achieve highest/safest level techniques  - Monitor for opioid side effects  - Notify MD/LIP if interventions unsuccessful or patient reports new pain  - Anticipate increased pain with activity and pre-medicate as appropriate  Outcome: Progressing     Problem: SAFETY ADULT - FALL  Goal

## 2021-12-16 ENCOUNTER — PATIENT OUTREACH (OUTPATIENT)
Dept: CASE MANAGEMENT | Age: 71
End: 2021-12-16

## 2021-12-16 NOTE — PROGRESS NOTES
1St attempt at to review Dm Follow up Question       Unable to contact       No VM due to phone ranging then just beeping     Will try again

## 2021-12-17 NOTE — PROGRESS NOTES
2ND attempt at to review Dm Follow up Question       Unable to contact       No VM due to phone ranging then just beeping     Will try again

## 2021-12-22 NOTE — PROGRESS NOTES
3rd attempt at to review Dm Follow up Question       Unable to contact       No VM due to phone ranging then just beeping     Will try again

## 2022-01-18 PROBLEM — E11.9 TYPE 2 DIABETES MELLITUS (CMD): Status: ACTIVE | Noted: 2022-01-18

## 2022-02-01 ENCOUNTER — HOSPITAL ENCOUNTER (EMERGENCY)
Facility: HOSPITAL | Age: 72
Discharge: HOME | End: 2022-02-01
Attending: EMERGENCY MEDICINE
Payer: MEDICARE

## 2022-02-01 ENCOUNTER — APPOINTMENT (OUTPATIENT)
Dept: GENERAL RADIOLOGY | Facility: HOSPITAL | Age: 72
End: 2022-02-01
Attending: EMERGENCY MEDICINE
Payer: MEDICARE

## 2022-02-01 VITALS
HEART RATE: 74 BPM | DIASTOLIC BLOOD PRESSURE: 104 MMHG | RESPIRATION RATE: 18 BRPM | TEMPERATURE: 97 F | HEIGHT: 67 IN | BODY MASS INDEX: 28.56 KG/M2 | OXYGEN SATURATION: 96 % | SYSTOLIC BLOOD PRESSURE: 164 MMHG | WEIGHT: 182 LBS

## 2022-02-01 DIAGNOSIS — M54.41 CHRONIC BILATERAL LOW BACK PAIN WITH BILATERAL SCIATICA: ICD-10-CM

## 2022-02-01 DIAGNOSIS — G89.29 CHRONIC BILATERAL LOW BACK PAIN WITH BILATERAL SCIATICA: ICD-10-CM

## 2022-02-01 DIAGNOSIS — M54.42 CHRONIC BILATERAL LOW BACK PAIN WITH BILATERAL SCIATICA: ICD-10-CM

## 2022-02-01 DIAGNOSIS — R60.0 BILATERAL LOWER EXTREMITY EDEMA: Primary | ICD-10-CM

## 2022-02-01 LAB
ALBUMIN SERPL-MCNC: 3.4 G/DL (ref 3.4–5)
ALP LIVER SERPL-CCNC: 75 U/L
ALT SERPL-CCNC: 19 U/L
ANION GAP SERPL CALC-SCNC: 4 MMOL/L (ref 0–18)
AST SERPL-CCNC: 13 U/L (ref 15–37)
BASOPHILS # BLD AUTO: 0.03 X10(3) UL (ref 0–0.2)
BASOPHILS NFR BLD AUTO: 0.5 %
BILIRUB DIRECT SERPL-MCNC: 0.1 MG/DL (ref 0–0.2)
BILIRUB SERPL-MCNC: 0.4 MG/DL (ref 0.1–2)
BUN BLD-MCNC: 24 MG/DL (ref 7–18)
BUN/CREAT SERPL: 12.2 (ref 10–20)
CALCIUM BLD-MCNC: 9.2 MG/DL (ref 8.5–10.1)
CHLORIDE SERPL-SCNC: 105 MMOL/L (ref 98–112)
CO2 SERPL-SCNC: 32 MMOL/L (ref 21–32)
CREAT BLD-MCNC: 1.97 MG/DL
DEPRECATED RDW RBC AUTO: 48.7 FL (ref 35.1–46.3)
EOSINOPHIL # BLD AUTO: 0.19 X10(3) UL (ref 0–0.7)
EOSINOPHIL NFR BLD AUTO: 3.1 %
ERYTHROCYTE [DISTWIDTH] IN BLOOD BY AUTOMATED COUNT: 13.5 % (ref 11–15)
GLUCOSE BLD-MCNC: 151 MG/DL (ref 70–99)
GLUCOSE BLDC GLUCOMTR-MCNC: 145 MG/DL (ref 70–99)
HCT VFR BLD AUTO: 45.8 %
HGB BLD-MCNC: 14.5 G/DL
IMM GRANULOCYTES # BLD AUTO: 0.01 X10(3) UL (ref 0–1)
IMM GRANULOCYTES NFR BLD: 0.2 %
LYMPHOCYTES # BLD AUTO: 1.41 X10(3) UL (ref 1–4)
LYMPHOCYTES NFR BLD AUTO: 23.2 %
MCHC RBC AUTO-ENTMCNC: 31.7 G/DL (ref 31–37)
MCV RBC AUTO: 96.8 FL
MONOCYTES # BLD AUTO: 0.57 X10(3) UL (ref 0.1–1)
MONOCYTES NFR BLD AUTO: 9.4 %
NEUTROPHILS # BLD AUTO: 3.88 X10(3) UL (ref 1.5–7.7)
NEUTROPHILS NFR BLD AUTO: 63.6 %
NT-PROBNP SERPL-MCNC: 741 PG/ML (ref ?–125)
OSMOLALITY SERPL CALC.SUM OF ELEC: 299 MOSM/KG (ref 275–295)
POTASSIUM SERPL-SCNC: 3.9 MMOL/L (ref 3.5–5.1)
PROT SERPL-MCNC: 6.7 G/DL (ref 6.4–8.2)
RBC # BLD AUTO: 4.73 X10(6)UL
SODIUM SERPL-SCNC: 141 MMOL/L (ref 136–145)
WBC # BLD AUTO: 6.1 X10(3) UL (ref 4–11)

## 2022-02-01 PROCEDURE — 99284 EMERGENCY DEPT VISIT MOD MDM: CPT

## 2022-02-01 PROCEDURE — 36415 COLL VENOUS BLD VENIPUNCTURE: CPT

## 2022-02-01 PROCEDURE — 83880 ASSAY OF NATRIURETIC PEPTIDE: CPT | Performed by: EMERGENCY MEDICINE

## 2022-02-01 PROCEDURE — 85025 COMPLETE CBC W/AUTO DIFF WBC: CPT | Performed by: EMERGENCY MEDICINE

## 2022-02-01 PROCEDURE — 80076 HEPATIC FUNCTION PANEL: CPT | Performed by: EMERGENCY MEDICINE

## 2022-02-01 PROCEDURE — 80048 BASIC METABOLIC PNL TOTAL CA: CPT | Performed by: EMERGENCY MEDICINE

## 2022-02-01 PROCEDURE — 82962 GLUCOSE BLOOD TEST: CPT

## 2022-02-01 PROCEDURE — 71045 X-RAY EXAM CHEST 1 VIEW: CPT | Performed by: EMERGENCY MEDICINE

## 2022-02-01 RX ORDER — FUROSEMIDE 20 MG/1
20 TABLET ORAL 2 TIMES DAILY
Qty: 60 TABLET | Refills: 0 | Status: SHIPPED | OUTPATIENT
Start: 2022-02-01 | End: 2022-02-10

## 2022-02-01 RX ORDER — PREGABALIN 75 MG/1
75 CAPSULE ORAL 2 TIMES DAILY
Qty: 60 CAPSULE | Refills: 0 | Status: SHIPPED | OUTPATIENT
Start: 2022-02-01 | End: 2022-03-03

## 2022-02-01 NOTE — ED QUICK NOTES
Patient requesting his glucose strips be refilled. Patient's phone given to MD to evaluate requested prescriptions.

## 2022-02-01 NOTE — ED INITIAL ASSESSMENT (HPI)
Patient from home with c/o developing a blister to his left foot overnight. Also c/o \"excruciating\" back pain and swollen feet for the past year.

## 2022-02-01 NOTE — ED QUICK NOTES
Patient cleared for discharge by MD. Patient verbalizes understanding of discharge instructions and prescriptions. Patient exited ER via wheelchair.

## 2022-02-08 ENCOUNTER — HOSPITAL ENCOUNTER (OUTPATIENT)
Facility: HOSPITAL | Age: 72
Setting detail: OBSERVATION
Discharge: HOME HEALTH | End: 2022-02-10
Attending: EMERGENCY MEDICINE | Admitting: HOSPITALIST
Payer: MEDICARE

## 2022-02-08 ENCOUNTER — APPOINTMENT (OUTPATIENT)
Dept: CT IMAGING | Facility: HOSPITAL | Age: 72
End: 2022-02-08
Attending: EMERGENCY MEDICINE
Payer: MEDICARE

## 2022-02-08 DIAGNOSIS — R26.81 UNSTEADY GAIT WHEN WALKING: Primary | ICD-10-CM

## 2022-02-08 LAB
ANION GAP SERPL CALC-SCNC: 5 MMOL/L (ref 0–18)
BASOPHILS # BLD AUTO: 0.03 X10(3) UL (ref 0–0.2)
BASOPHILS NFR BLD AUTO: 0.4 %
BUN BLD-MCNC: 31 MG/DL (ref 7–18)
BUN/CREAT SERPL: 12 (ref 10–20)
CHLORIDE SERPL-SCNC: 103 MMOL/L (ref 98–112)
CO2 SERPL-SCNC: 33 MMOL/L (ref 21–32)
CREAT BLD-MCNC: 2.58 MG/DL
DEPRECATED RDW RBC AUTO: 48.4 FL (ref 35.1–46.3)
EOSINOPHIL # BLD AUTO: 0.23 X10(3) UL (ref 0–0.7)
EOSINOPHIL NFR BLD AUTO: 3.3 %
ERYTHROCYTE [DISTWIDTH] IN BLOOD BY AUTOMATED COUNT: 13.3 % (ref 11–15)
GLUCOSE BLD-MCNC: 152 MG/DL (ref 70–99)
HCT VFR BLD AUTO: 49.3 %
HGB BLD-MCNC: 15.6 G/DL
IMM GRANULOCYTES # BLD AUTO: 0.01 X10(3) UL (ref 0–1)
IMM GRANULOCYTES NFR BLD: 0.1 %
LYMPHOCYTES # BLD AUTO: 1.34 X10(3) UL (ref 1–4)
MCHC RBC AUTO-ENTMCNC: 31.6 G/DL (ref 31–37)
MCV RBC AUTO: 99.4 FL
MONOCYTES # BLD AUTO: 0.61 X10(3) UL (ref 0.1–1)
MONOCYTES NFR BLD AUTO: 8.8 %
NEUTROPHILS # BLD AUTO: 4.71 X10 (3) UL (ref 1.5–7.7)
NEUTROPHILS # BLD AUTO: 4.71 X10(3) UL (ref 1.5–7.7)
NEUTROPHILS NFR BLD AUTO: 68.1 %
OSMOLALITY SERPL CALC.SUM OF ELEC: 302 MOSM/KG (ref 275–295)
PLATELET # BLD AUTO: 178 10(3)UL (ref 150–450)
POTASSIUM SERPL-SCNC: 3.8 MMOL/L (ref 3.5–5.1)
RBC # BLD AUTO: 4.96 X10(6)UL
SARS-COV-2 RNA RESP QL NAA+PROBE: NOT DETECTED
SODIUM SERPL-SCNC: 141 MMOL/L (ref 136–145)
WBC # BLD AUTO: 6.9 X10(3) UL (ref 4–11)

## 2022-02-08 PROCEDURE — 99220 INITIAL OBSERVATION CARE,LEVL III: CPT | Performed by: HOSPITALIST

## 2022-02-08 PROCEDURE — 70450 CT HEAD/BRAIN W/O DYE: CPT | Performed by: EMERGENCY MEDICINE

## 2022-02-08 RX ORDER — PREDNISONE 20 MG/1
20 TABLET ORAL ONCE
Status: COMPLETED | OUTPATIENT
Start: 2022-02-08 | End: 2022-02-08

## 2022-02-08 RX ORDER — MECLIZINE HYDROCHLORIDE 25 MG/1
25 TABLET ORAL ONCE
Status: COMPLETED | OUTPATIENT
Start: 2022-02-08 | End: 2022-02-08

## 2022-02-08 NOTE — ED QUICK NOTES
Two person assist to bathroom. Unsteady gait. Mri called- patient has ICD unable to MRI at this time.   Patient left arm glucose monitor that remains in place at this time

## 2022-02-08 NOTE — ED INITIAL ASSESSMENT (HPI)
Patient states he is feeling \"off balanced\" since yesterday, +dizziness. Denies n/v. States he fell today, denies hitting head.

## 2022-02-09 LAB
ANION GAP SERPL CALC-SCNC: 6 MMOL/L (ref 0–18)
BASOPHILS # BLD AUTO: 0.02 X10(3) UL (ref 0–0.2)
BASOPHILS NFR BLD AUTO: 0.3 %
BUN BLD-MCNC: 38 MG/DL (ref 7–18)
BUN/CREAT SERPL: 14 (ref 10–20)
CALCIUM BLD-MCNC: 8.7 MG/DL (ref 8.5–10.1)
CHLORIDE SERPL-SCNC: 100 MMOL/L (ref 98–112)
CO2 SERPL-SCNC: 31 MMOL/L (ref 21–32)
CREAT BLD-MCNC: 2.71 MG/DL
DEPRECATED RDW RBC AUTO: 45.8 FL (ref 35.1–46.3)
EOSINOPHIL # BLD AUTO: 0 X10(3) UL (ref 0–0.7)
EOSINOPHIL NFR BLD AUTO: 0 %
ERYTHROCYTE [DISTWIDTH] IN BLOOD BY AUTOMATED COUNT: 12.9 % (ref 11–15)
GLUCOSE BLD-MCNC: 317 MG/DL (ref 70–99)
GLUCOSE BLDC GLUCOMTR-MCNC: 221 MG/DL (ref 70–99)
GLUCOSE BLDC GLUCOMTR-MCNC: 269 MG/DL (ref 70–99)
GLUCOSE BLDC GLUCOMTR-MCNC: 298 MG/DL (ref 70–99)
GLUCOSE BLDC GLUCOMTR-MCNC: 312 MG/DL (ref 70–99)
GLUCOSE BLDC GLUCOMTR-MCNC: 323 MG/DL (ref 70–99)
HGB BLD-MCNC: 14 G/DL
IMM GRANULOCYTES # BLD AUTO: 0.02 X10(3) UL (ref 0–1)
IMM GRANULOCYTES NFR BLD: 0.3 %
LYMPHOCYTES # BLD AUTO: 0.59 X10(3) UL (ref 1–4)
LYMPHOCYTES NFR BLD AUTO: 8 %
MCH RBC QN AUTO: 31 PG (ref 26–34)
MCHC RBC AUTO-ENTMCNC: 32 G/DL (ref 31–37)
MCV RBC AUTO: 97.1 FL
MONOCYTES # BLD AUTO: 0.27 X10(3) UL (ref 0.1–1)
MONOCYTES NFR BLD AUTO: 3.7 %
NEUTROPHILS # BLD AUTO: 6.43 X10 (3) UL (ref 1.5–7.7)
NEUTROPHILS # BLD AUTO: 6.43 X10(3) UL (ref 1.5–7.7)
NEUTROPHILS NFR BLD AUTO: 87.7 %
OSMOLALITY SERPL CALC.SUM OF ELEC: 305 MOSM/KG (ref 275–295)
PLATELET # BLD AUTO: 162 10(3)UL (ref 150–450)
POTASSIUM SERPL-SCNC: 5.1 MMOL/L (ref 3.5–5.1)
RBC # BLD AUTO: 4.51 X10(6)UL
SODIUM SERPL-SCNC: 137 MMOL/L (ref 136–145)
WBC # BLD AUTO: 7.3 X10(3) UL (ref 4–11)

## 2022-02-09 PROCEDURE — 99226 SUBSEQUENT OBSERVATION CARE: CPT | Performed by: HOSPITALIST

## 2022-02-09 PROCEDURE — 99223 1ST HOSP IP/OBS HIGH 75: CPT | Performed by: OTHER

## 2022-02-09 RX ORDER — NICOTINE POLACRILEX 4 MG
30 LOZENGE BUCCAL
Status: DISCONTINUED | OUTPATIENT
Start: 2022-02-09 | End: 2022-02-10

## 2022-02-09 RX ORDER — PREDNISONE 20 MG/1
20 TABLET ORAL
Status: COMPLETED | OUTPATIENT
Start: 2022-02-09 | End: 2022-02-10

## 2022-02-09 RX ORDER — AMIODARONE HYDROCHLORIDE 200 MG/1
200 TABLET ORAL DAILY
Status: DISCONTINUED | OUTPATIENT
Start: 2022-02-09 | End: 2022-02-10

## 2022-02-09 RX ORDER — NICOTINE POLACRILEX 4 MG
15 LOZENGE BUCCAL
Status: DISCONTINUED | OUTPATIENT
Start: 2022-02-09 | End: 2022-02-10

## 2022-02-09 RX ORDER — ACETAMINOPHEN 325 MG/1
650 TABLET ORAL EVERY 6 HOURS PRN
Status: DISCONTINUED | OUTPATIENT
Start: 2022-02-09 | End: 2022-02-10

## 2022-02-09 RX ORDER — METOCLOPRAMIDE HYDROCHLORIDE 5 MG/ML
5 INJECTION INTRAMUSCULAR; INTRAVENOUS EVERY 8 HOURS PRN
Status: DISCONTINUED | OUTPATIENT
Start: 2022-02-09 | End: 2022-02-10

## 2022-02-09 RX ORDER — TRAMADOL HYDROCHLORIDE 50 MG/1
50 TABLET ORAL EVERY 12 HOURS PRN
Status: DISCONTINUED | OUTPATIENT
Start: 2022-02-09 | End: 2022-02-10

## 2022-02-09 RX ORDER — DEXTROSE MONOHYDRATE 25 G/50ML
50 INJECTION, SOLUTION INTRAVENOUS
Status: DISCONTINUED | OUTPATIENT
Start: 2022-02-09 | End: 2022-02-10

## 2022-02-09 RX ORDER — MECLIZINE HYDROCHLORIDE 25 MG/1
25 TABLET ORAL EVERY 6 HOURS SCHEDULED
Status: DISCONTINUED | OUTPATIENT
Start: 2022-02-09 | End: 2022-02-10

## 2022-02-09 RX ORDER — PREGABALIN 75 MG/1
75 CAPSULE ORAL 2 TIMES DAILY
Status: DISCONTINUED | OUTPATIENT
Start: 2022-02-09 | End: 2022-02-10

## 2022-02-09 RX ORDER — ONDANSETRON 2 MG/ML
4 INJECTION INTRAMUSCULAR; INTRAVENOUS EVERY 6 HOURS PRN
Status: DISCONTINUED | OUTPATIENT
Start: 2022-02-09 | End: 2022-02-10

## 2022-02-09 NOTE — H&P
[facility]    PATIENT'S NAME: Tomas Velez [facility]   ATTENDING PHYSICIAN: Ryan Alfaro DO   PATIENT ACCOUNT#:   [de-identified]    LOCATION:  [place] [address] [address] [facility]  MEDICAL RECORD #:   [id number]       YOB: 1950  ADMISSION DATE:       02/08/2022    HISTORY AND PHYSICAL EXAMINATION    CHIEF COMPLAINT:  Vertigo. HISTORY OF PRESENT ILLNESS:  Patient is a 76-year-old  male with a history of ischemic cardiomyopathy and diabetes mellitus type 2, who came into the emergency department for evaluation of spinning sensation and lightheadedness that started earlier today. CBC was unremarkable. Chemistry showed GFR of 24, which is below his baseline. BUN and creatinine of 31 and 2.58. EKG showed paced ventricular rhythm. COVID testing was negative. CT scan of the brain was negative. Patient was given prednisone and meclizine, and he will be admitted to the hospital for further management. Also, he was given 500 mL of normal saline IV bolus. 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 PAST SURGICAL HISTORY:  Coronary artery bypass graft surgery, aortic valve replacement with bioprosthesis and ascending aorta graft replacement. MEDICATIONS:  Please see medication reconciliation list.     ALLERGIES:  Ambien, Neurontin, and sulfa. Also, he had side effects to Toprol and Darvocet. FAMILY HISTORY:  Mother had coronary artery disease. Father had alcoholism and diabetes mellitus type 2. SOCIAL HISTORY:  No tobacco, alcohol, or drug use. Lives in a hotel.   Usually independent in his basic activities of daily living. REVIEW OF SYSTEMS:  Patient said he had vertigo in the past, and this time, it felt a bit different. He feels like he is drunk, and that happens only when he moves his head around. He had to lie still without moving his head in order for his symptoms to get better. He felt a bit nauseated with it. He denies any recent upper respiratory tract infection symptoms. No chest pain. No shortness of breath. Other 12-point review of systems is negative. PHYSICAL EXAMINATION:    GENERAL:  Alert and oriented to time, place and person. No acute distress. VITAL SIGNS:  Temperature 97.4, pulse 73, respiratory rate 17, blood pressure 115/71, pulse ox 98% on room air. HEENT:  Atraumatic. Oropharynx clear. Dry mucous membranes. Ears and nose normal.  Eyes:  Injected conjunctivae bilaterally. Pupils are equal, round and reactive. Ear examination showed cerumen impaction on the right side and lesser degree on the left side. NECK:  Supple. No lymphadenopathy. Trachea midline. Full range of motion. LUNGS:  Clear to auscultation bilaterally. Normal respiratory effort. HEART:  Regular rate and rhythm. S1 and S2 auscultated. No murmur. ABDOMEN:  Soft, nondistended. No tenderness. Positive bowel sounds. EXTREMITIES:  No peripheral edema, clubbing or cyanosis. NEUROLOGIC:  Motor and sensory intact. Cranial nerves II to XII are intact. ASSESSMENT:    1. Vertigo, most likely peripheral.  Patient is compliant with Eliquis. Doubt any central incident. Most likely vestibulitis. 2.   Diabetes mellitus type 2.  3.   Chronic kidney disease stage 3 with mild acute on chronic component. 4.   Hypertension. 5.   Cardiomyopathy. No evidence of fluid overload. PLAN:  Patient will be admitted to general medical floor. Received some IV fluids in the emergency room. We will give him short course of prednisone. Meclizine. Fall precautions.   Physical therapy for vestibular maneuvers and ear wax removal by Nursing. Further recommendations to follow.      Dictated By Yvette Gonzalez MD  d: 02/08/2022 19:11:04  t: 02/08/2022 19:57:07  [facility] [id number]  SHANTEL/

## 2022-02-09 NOTE — ED QUICK NOTES
Rounding Completed    Plan of Care reviewed. Waiting for bed. Bed is locked and in lowest position. Call light within reach.

## 2022-02-09 NOTE — PLAN OF CARE
Patient arrived from ED, A/ox4, on RA, ACHS, tolerating diet, bedrest, bathroom privileges, up x1 walker SBA, high fall risk unsteady gait, dizzy at times, voiding freely, received PRN Tramadol for pain. Call light within reach, fall precautions in place, frequent rounding done, will continue to monitor. Plan is to have PT/OT evaluation today in the morning.

## 2022-02-09 NOTE — PLAN OF CARE
Pt up with 1 person assistance to bathroom , also worked with PT/OT, accu check elevated Dr Cuca Solorzano aware insulin adjusted, pt has continuous glucose monitor, plan for MRI tomorrow, check list done. Neurology (Dr Rip Crystal) notified of the fact that CTA cannot be done because of impaired kidney function.    Problem: Patient Centered Care  Goal: Patient preferences are identified and integrated in the patient's plan of care  Description: Interventions:  - What would you like us to know as we care for you?   - Provide timely, complete, and accurate information to patient/family  - Incorporate patient and family knowledge, values, beliefs, and cultural backgrounds into the planning and delivery of care  - Encourage patient/family to participate in care and decision-making at the level they choose  - Honor patient and family perspectives and choices  Outcome: Progressing

## 2022-02-09 NOTE — CM/SW NOTE
02/09/22 1600   CM/SW Referral Data   Referral Source Social Work (self-referral)   Reason for Referral Discharge planning   Informant Patient   Patient Info   Patient's Current Mental Status at Time of Assessment Alert;Oriented   Patient's Home Environment Other  ([facility])   Patient lives with Alone   Patient Status Prior to Admission   Independent with ADLs and Mobility No   Pt. requires assistance with Housework;Meals; Bathing; Ambulating   Discharge Needs   Anticipated D/C needs Home health care;Subacute rehab; Other  (homemaker)       SW met with patient at bedside, introduced self and role. Patient lives alone at extended stay [facility] (address correct on face sheet). Patient uses his wheel chair to ambulate at home, is very nervous to bath. States that it has been a while since he has taken a shower as he is worried he will fall in the shower. Patient does not drive. His friend, Juan Montero assists him with transportation, helps him out as needed. SW notified patient of PT rec for SALE[facility], patient admantly refused SALEEM. States he is very worried about catching covid. SW notified patient that it is the safest plan for him right now as he is not independent at home with his ADL's. Patient continued to decline SALEEM. Patient is agreeable to home health and homemaker referral. States that he has used [facility] in the past, would like to use them again. Patient states he will need a Medicar ride home upon DC. Patient has Medicaid, states he receives SNAP benefits, gets his groceries delivered through [address]. Would like transportation resources. PLAN: PT rec [facility], patient is refusing. Referral sent to [facility] upon request, orders/f2f placed. Homemaker referral placed through [address]. SW to follow case. Please notify as needs arise.      NIK Juarez, [facility]    [id number]

## 2022-02-09 NOTE — ED QUICK NOTES
Orders for admission, patient is aware of plan and ready to go upstairs. Any questions, please call Ila Helm RN at extension [phone].     Patient Covid vaccination status: Fully vaccinated     COVID Test Ordered in ED: Rapid SARS-CoV-2 by PCR    COVID Suspicion at Admission: No risk with negative rapid    Running Infusions:  None    Mental Status/LOC at time of transport:  AOX3     Other pertinent information:   CIWA score: N/A   NIH score:  N/A

## 2022-02-10 ENCOUNTER — APPOINTMENT (OUTPATIENT)
Dept: MRI IMAGING | Facility: HOSPITAL | Age: 72
End: 2022-02-10
Attending: HOSPITALIST
Payer: MEDICARE

## 2022-02-10 ENCOUNTER — APPOINTMENT (OUTPATIENT)
Dept: MRI IMAGING | Facility: HOSPITAL | Age: 72
End: 2022-02-10
Attending: Other
Payer: MEDICARE

## 2022-02-10 VITALS
TEMPERATURE: 97 F | OXYGEN SATURATION: 95 % | DIASTOLIC BLOOD PRESSURE: 77 MMHG | WEIGHT: 195.38 LBS | BODY MASS INDEX: 31 KG/M2 | RESPIRATION RATE: 18 BRPM | SYSTOLIC BLOOD PRESSURE: 119 MMHG | HEART RATE: 68 BPM

## 2022-02-10 LAB
ANION GAP SERPL CALC-SCNC: 9 MMOL/L (ref 0–18)
BASOPHILS # BLD AUTO: 0.03 X10(3) UL (ref 0–0.2)
BASOPHILS NFR BLD AUTO: 0.3 %
BUN BLD-MCNC: 39 MG/DL (ref 7–18)
BUN/CREAT SERPL: 17.6 (ref 10–20)
CALCIUM BLD-MCNC: 8.6 MG/DL (ref 8.5–10.1)
CHLORIDE SERPL-SCNC: 102 MMOL/L (ref 98–112)
CO2 SERPL-SCNC: 30 MMOL/L (ref 21–32)
CREAT BLD-MCNC: 2.22 MG/DL
DEPRECATED RDW RBC AUTO: 46.7 FL (ref 35.1–46.3)
EOSINOPHIL # BLD AUTO: 0.12 X10(3) UL (ref 0–0.7)
EOSINOPHIL NFR BLD AUTO: 1.4 %
ERYTHROCYTE [DISTWIDTH] IN BLOOD BY AUTOMATED COUNT: 13.1 % (ref 11–15)
GLUCOSE BLD-MCNC: 150 MG/DL (ref 70–99)
GLUCOSE BLDC GLUCOMTR-MCNC: 119 MG/DL (ref 70–99)
GLUCOSE BLDC GLUCOMTR-MCNC: 188 MG/DL (ref 70–99)
HCT VFR BLD AUTO: 41.3 %
HGB BLD-MCNC: 13.4 G/DL
IMM GRANULOCYTES # BLD AUTO: 0.04 X10(3) UL (ref 0–1)
IMM GRANULOCYTES NFR BLD: 0.5 %
LYMPHOCYTES # BLD AUTO: 1.37 X10(3) UL (ref 1–4)
LYMPHOCYTES NFR BLD AUTO: 15.7 %
MAGNESIUM SERPL-MCNC: 1.8 MG/DL (ref 1.7–2.8)
MCH RBC QN AUTO: 31.9 PG (ref 26–34)
MCHC RBC AUTO-ENTMCNC: 32.4 G/DL (ref 31–37)
MCV RBC AUTO: 98.3 FL
MONOCYTES # BLD AUTO: 0.67 X10(3) UL (ref 0.1–1)
MONOCYTES NFR BLD AUTO: 7.7 %
NEUTROPHILS # BLD AUTO: 6.47 X10 (3) UL (ref 1.5–7.7)
NEUTROPHILS # BLD AUTO: 6.47 X10(3) UL (ref 1.5–7.7)
NEUTROPHILS NFR BLD AUTO: 74.4 %
OSMOLALITY SERPL CALC.SUM OF ELEC: 304 MOSM/KG (ref 275–295)
PLATELET # BLD AUTO: 153 10(3)UL (ref 150–450)
POTASSIUM SERPL-SCNC: 4 MMOL/L (ref 3.5–5.1)
RBC # BLD AUTO: 4.2 X10(6)UL
SODIUM SERPL-SCNC: 141 MMOL/L (ref 136–145)
WBC # BLD AUTO: 8.7 X10(3) UL (ref 4–11)

## 2022-02-10 PROCEDURE — 70551 MRI BRAIN STEM W/O DYE: CPT | Performed by: HOSPITALIST

## 2022-02-10 PROCEDURE — 70544 MR ANGIOGRAPHY HEAD W/O DYE: CPT | Performed by: OTHER

## 2022-02-10 PROCEDURE — 99217 OBSERVATION CARE DISCHARGE: CPT | Performed by: HOSPITALIST

## 2022-02-10 PROCEDURE — 70547 MR ANGIOGRAPHY NECK W/O DYE: CPT | Performed by: OTHER

## 2022-02-10 RX ORDER — MECLIZINE HYDROCHLORIDE 25 MG/1
25 TABLET ORAL 3 TIMES DAILY PRN
Qty: 30 TABLET | Refills: 0 | Status: SHIPPED
Start: 2022-02-10 | End: 2022-02-22

## 2022-02-10 RX ORDER — MECLIZINE HYDROCHLORIDE 25 MG/1
25 TABLET ORAL 3 TIMES DAILY PRN
Qty: 30 TABLET | Refills: 0 | Status: SHIPPED | OUTPATIENT
Start: 2022-02-10 | End: 2022-02-10

## 2022-02-10 NOTE — PHYSICAL THERAPY NOTE
Attempted to see patient for physical therapy session. Per RN, patient discharging today. Does not need physical therapy at this time. Patient to D/C at this time.

## 2022-02-10 NOTE — IMAGING NOTE
1006  Patient to MRI holding room. [employer] pacemaker / ICD representative present to program patient's ICD to MRI safe mode. Patient hx of LBBB, CHF and cardiac arrest. HR set at 85. VSS    1014  VS  HR 85  /47  SPO2 96% ON RA    1018  MRI SCAN BEGINS    1024  VS  HR 85  /57  SPO2 94%    1034  VS  HR 85  /47  SPO2 93%    1044  VS  HR 85  /58 SPO2 92%    1054  VS  HR 85  /49  SPO2 95%    1104  VS  HR 85  /58  SPO2 96%    1110  VS  HR 85  /50  SPO2 96%. MRI SCAN COMPLETE. Patient to MRI holding area. Pacemaker representative present to reprogram patient's ICD to original settings. Patient in stable condition. Transport to transfer patient to unit.

## 2022-02-10 NOTE — PLAN OF CARE
A/O x 4. Walking stand-by assist with walker. Ok to discharge home today. Discharge education explained to pt, verbalized understanding, all questions answered. Pt educated on importance of making an appointment with Dr. Khushboo Malhotra, verbalized understanding.  Leaving via [employer] at 3pm.

## 2022-02-10 NOTE — PLAN OF CARE
Travis Adame is A&Ox4. Denies pain. Vitals stable. Still with unsteady gait. Tolerating diet. ACHS accuchecks. Levemir at bedtime. Plan for MRI today. Will continue plan of care. Problem: Patient Centered Care  Goal: Patient preferences are identified and integrated in the patient's plan of care  Description: Interventions:  - What would you like us to know as we care for you?  I live alone  - Provide timely, complete, and accurate information to patient/family  - Incorporate patient and family knowledge, values, beliefs, and cultural backgrounds into the planning and delivery of care  - Encourage patient/family to participate in care and decision-making at the level they choose  - Honor patient and family perspectives and choices  2/10/2022 0450 by Fabian Kaminski RN  Outcome: Progressing  2/10/2022 0449 by Fabian Kaminski RN  Outcome: Progressing     Problem: Diabetes/Glucose Control  Goal: Glucose maintained within prescribed range  Description: INTERVENTIONS:  - Monitor Blood Glucose as ordered  - Assess for signs and symptoms of hyperglycemia and hypoglycemia  - Administer ordered medications to maintain glucose within target range  - Assess barriers to adequate nutritional intake and initiate nutrition consult as needed  - Instruct patient on self management of diabetes  2/10/2022 0450 by Fabian Kaminski RN  Outcome: Progressing  2/10/2022 0449 by Fabian Kaminski RN  Outcome: Progressing     Problem: Patient/Family Goals  Goal: Patient/Family Long Term Goal  Description: Patient's Long Term Goal: Return home    Interventions:  - Manage pain  - PT/OT  -Tolerate meals  - See additional Care Plan goals for specific interventions  2/10/2022 0450 by Fabian Kaminski RN  Outcome: Progressing  2/10/2022 0449 by Fabian Kaminski RN  Outcome: Progressing  Goal: Patient/Family Short Term Goal  Description: Patient's Short Term Goal: PT/OT    Interventions:   - Monitor mobility  -Ambulate as ordered  - See additional Care Plan goals for specific interventions  2/10/2022 0450 by Fabian Kaminski RN  Outcome: Progressing  2/10/2022 0449 by Fabian Kaminski RN  Outcome: Progressing

## 2022-02-10 NOTE — CM/SW NOTE
02/10/22 1200   Discharge disposition   Expected discharge disposition [facility] Provider   (Mart Turcios)       Patient will DC today. Notified [facility]. RN to notify  if patient will need a Medicar home. 119pm  Met with patient at bedside, provided DCSS brochures. Patient requesting Tete Maxwellth ride home.      [facility] set for 3pm. PCS DONE    NIK Willett, [facility]    [id number]

## 2022-02-11 ENCOUNTER — PATIENT OUTREACH (OUTPATIENT)
Dept: CASE MANAGEMENT | Age: 72
End: 2022-02-11

## 2022-02-11 PROCEDURE — 1111F DSCHRG MED/CURRENT MED MERGE: CPT

## 2022-02-11 NOTE — PROGRESS NOTES
Attempted to reach the patient to complete a [facility] FU call. Left a message for the pt to call the [facility] back at, [phone].

## 2022-02-13 ENCOUNTER — HOSPITAL ENCOUNTER (INPATIENT)
Facility: HOSPITAL | Age: 72
LOS: 9 days | Discharge: NURSING, REHAB OR LONG-TERM CARE | DRG: 454 | End: 2022-02-22
Attending: EMERGENCY MEDICINE | Admitting: HOSPITALIST
Payer: MEDICARE

## 2022-02-13 DIAGNOSIS — I48.0 PAF (PAROXYSMAL ATRIAL FIBRILLATION) (HCC): ICD-10-CM

## 2022-02-13 DIAGNOSIS — M54.16 LUMBAR RADICULOPATHY: Primary | ICD-10-CM

## 2022-02-13 DIAGNOSIS — Z95.2 S/P AVR: ICD-10-CM

## 2022-02-13 DIAGNOSIS — R53.1 WEAKNESS: ICD-10-CM

## 2022-02-13 DIAGNOSIS — I25.810 CORONARY ARTERY DISEASE INVOLVING CORONARY BYPASS GRAFT OF NATIVE HEART WITHOUT ANGINA PECTORIS: ICD-10-CM

## 2022-02-13 LAB
ANION GAP SERPL CALC-SCNC: 6 MMOL/L (ref 0–18)
BASOPHILS # BLD AUTO: 0.03 X10(3) UL (ref 0–0.2)
BASOPHILS NFR BLD AUTO: 0.4 %
BILIRUB UR QL: NEGATIVE
BUN BLD-MCNC: 41 MG/DL (ref 7–18)
BUN/CREAT SERPL: 16.5 (ref 10–20)
CALCIUM BLD-MCNC: 9.6 MG/DL (ref 8.5–10.1)
CHLORIDE SERPL-SCNC: 101 MMOL/L (ref 98–112)
CLARITY UR: CLEAR
CO2 SERPL-SCNC: 31 MMOL/L (ref 21–32)
COLOR UR: YELLOW
CREAT BLD-MCNC: 2.48 MG/DL
DEPRECATED RDW RBC AUTO: 45.2 FL (ref 35.1–46.3)
EOSINOPHIL # BLD AUTO: 0.15 X10(3) UL (ref 0–0.7)
EOSINOPHIL NFR BLD AUTO: 2.2 %
ERYTHROCYTE [DISTWIDTH] IN BLOOD BY AUTOMATED COUNT: 12.8 % (ref 11–15)
GLUCOSE BLD-MCNC: 173 MG/DL (ref 70–99)
GLUCOSE BLDC GLUCOMTR-MCNC: 170 MG/DL (ref 70–99)
GLUCOSE BLDC GLUCOMTR-MCNC: 194 MG/DL (ref 70–99)
GLUCOSE BLDC GLUCOMTR-MCNC: 357 MG/DL (ref 70–99)
GLUCOSE BLDC GLUCOMTR-MCNC: 369 MG/DL (ref 70–99)
GLUCOSE BLDC GLUCOMTR-MCNC: 382 MG/DL (ref 70–99)
GLUCOSE BLDC GLUCOMTR-MCNC: 428 MG/DL (ref 70–99)
GLUCOSE UR-MCNC: NEGATIVE MG/DL
HCT VFR BLD AUTO: 45.5 %
HGB BLD-MCNC: 14.8 G/DL
IMM GRANULOCYTES # BLD AUTO: 0.04 X10(3) UL (ref 0–1)
IMM GRANULOCYTES NFR BLD: 0.6 %
KETONES UR-MCNC: NEGATIVE MG/DL
LEUKOCYTE ESTERASE UR QL STRIP.AUTO: NEGATIVE
LYMPHOCYTES # BLD AUTO: 1.09 X10(3) UL (ref 1–4)
LYMPHOCYTES NFR BLD AUTO: 15.9 %
MCH RBC QN AUTO: 31.6 PG (ref 26–34)
MCHC RBC AUTO-ENTMCNC: 32.5 G/DL (ref 31–37)
MCV RBC AUTO: 97.2 FL
MONOCYTES # BLD AUTO: 0.62 X10(3) UL (ref 0.1–1)
MONOCYTES NFR BLD AUTO: 9.1 %
NEUTROPHILS # BLD AUTO: 4.92 X10 (3) UL (ref 1.5–7.7)
NEUTROPHILS # BLD AUTO: 4.92 X10(3) UL (ref 1.5–7.7)
NEUTROPHILS NFR BLD AUTO: 71.8 %
NITRITE UR QL STRIP.AUTO: NEGATIVE
OSMOLALITY SERPL CALC.SUM OF ELEC: 300 MOSM/KG (ref 275–295)
PH UR: 6 [PH] (ref 5–8)
PLATELET # BLD AUTO: 155 10(3)UL (ref 150–450)
POTASSIUM SERPL-SCNC: 3.8 MMOL/L (ref 3.5–5.1)
PROT UR-MCNC: NEGATIVE MG/DL
RBC # BLD AUTO: 4.68 X10(6)UL
SODIUM SERPL-SCNC: 138 MMOL/L (ref 136–145)
SP GR UR STRIP: 1.01 (ref 1–1.03)
UROBILINOGEN UR STRIP-ACNC: <2
WBC # BLD AUTO: 6.9 X10(3) UL (ref 4–11)

## 2022-02-13 PROCEDURE — 99222 1ST HOSP IP/OBS MODERATE 55: CPT | Performed by: HOSPITALIST

## 2022-02-13 RX ORDER — NICOTINE POLACRILEX 4 MG
30 LOZENGE BUCCAL
Status: DISCONTINUED | OUTPATIENT
Start: 2022-02-13 | End: 2022-02-22

## 2022-02-13 RX ORDER — TRAMADOL HYDROCHLORIDE 50 MG/1
50 TABLET ORAL ONCE
Status: COMPLETED | OUTPATIENT
Start: 2022-02-13 | End: 2022-02-13

## 2022-02-13 RX ORDER — METOCLOPRAMIDE 10 MG/1
5 TABLET ORAL EVERY 8 HOURS PRN
Status: DISCONTINUED | OUTPATIENT
Start: 2022-02-13 | End: 2022-02-22

## 2022-02-13 RX ORDER — ACETAMINOPHEN 325 MG/1
650 TABLET ORAL EVERY 6 HOURS PRN
Status: DISCONTINUED | OUTPATIENT
Start: 2022-02-13 | End: 2022-02-22

## 2022-02-13 RX ORDER — ALBUTEROL SULFATE 90 UG/1
2 AEROSOL, METERED RESPIRATORY (INHALATION) EVERY 6 HOURS PRN
Status: DISCONTINUED | OUTPATIENT
Start: 2022-02-13 | End: 2022-02-22

## 2022-02-13 RX ORDER — ONDANSETRON 2 MG/ML
4 INJECTION INTRAMUSCULAR; INTRAVENOUS EVERY 6 HOURS PRN
Status: DISCONTINUED | OUTPATIENT
Start: 2022-02-13 | End: 2022-02-13

## 2022-02-13 RX ORDER — PREGABALIN 75 MG/1
75 CAPSULE ORAL 2 TIMES DAILY
Status: DISCONTINUED | OUTPATIENT
Start: 2022-02-13 | End: 2022-02-22

## 2022-02-13 RX ORDER — NICOTINE POLACRILEX 4 MG
15 LOZENGE BUCCAL
Status: DISCONTINUED | OUTPATIENT
Start: 2022-02-13 | End: 2022-02-22

## 2022-02-13 RX ORDER — HYDRALAZINE HYDROCHLORIDE 20 MG/ML
10 INJECTION INTRAMUSCULAR; INTRAVENOUS EVERY 4 HOURS PRN
Status: DISCONTINUED | OUTPATIENT
Start: 2022-02-13 | End: 2022-02-22

## 2022-02-13 RX ORDER — CYCLOBENZAPRINE HCL 5 MG
5 TABLET ORAL 3 TIMES DAILY PRN
Status: DISCONTINUED | OUTPATIENT
Start: 2022-02-13 | End: 2022-02-22

## 2022-02-13 RX ORDER — PANTOPRAZOLE SODIUM 40 MG/1
40 TABLET, DELAYED RELEASE ORAL
Status: DISCONTINUED | OUTPATIENT
Start: 2022-02-13 | End: 2022-02-22

## 2022-02-13 RX ORDER — AMIODARONE HYDROCHLORIDE 200 MG/1
200 TABLET ORAL DAILY
Status: DISCONTINUED | OUTPATIENT
Start: 2022-02-13 | End: 2022-02-22

## 2022-02-13 RX ORDER — DEXTROSE MONOHYDRATE 25 G/50ML
50 INJECTION, SOLUTION INTRAVENOUS
Status: DISCONTINUED | OUTPATIENT
Start: 2022-02-13 | End: 2022-02-22

## 2022-02-13 RX ORDER — DEXAMETHASONE SODIUM PHOSPHATE 4 MG/ML
4 VIAL (ML) INJECTION ONCE
Status: COMPLETED | OUTPATIENT
Start: 2022-02-13 | End: 2022-02-13

## 2022-02-13 RX ORDER — TRAMADOL HYDROCHLORIDE 50 MG/1
50 TABLET ORAL EVERY 12 HOURS PRN
Status: DISCONTINUED | OUTPATIENT
Start: 2022-02-13 | End: 2022-02-22

## 2022-02-13 RX ORDER — MORPHINE SULFATE 2 MG/ML
2 INJECTION, SOLUTION INTRAMUSCULAR; INTRAVENOUS EVERY 2 HOUR PRN
Status: DISCONTINUED | OUTPATIENT
Start: 2022-02-13 | End: 2022-02-16

## 2022-02-13 RX ORDER — MORPHINE SULFATE 4 MG/ML
4 INJECTION, SOLUTION INTRAMUSCULAR; INTRAVENOUS EVERY 2 HOUR PRN
Status: DISCONTINUED | OUTPATIENT
Start: 2022-02-13 | End: 2022-02-16

## 2022-02-13 NOTE — PLAN OF CARE
Pt is A&Ox4, RA, Patient denying pain, resting comfortably in chair. Pt is tolerating diet. Pt has big blister on bottom of left foot, traced by night nurse and in chart. Night nurse put mepilex to protect. both knees have abrasions on them. Skin is very flaky and dry. Bilateral pedal edema and pulses are plus 1. Left foot is harder to feel pulse. Plan is to have cardio consult, pt has to have back surgery but needs to clear medical and cardio before this can be done. Bed in lowest position can call light within reach. Plan will be home after all clearances have been met.   Problem: Patient Centered Care  Goal: Patient preferences are identified and integrated in the patient's plan of care  Description: Interventions:  - What would you like us to know as we care for you?   - Provide timely, complete, and accurate information to patient/family  - Incorporate patient and family knowledge, values, beliefs, and cultural backgrounds into the planning and delivery of care  - Encourage patient/family to participate in care and decision-making at the level they choose  - Honor patient and family perspectives and choices  Outcome: Progressing     Problem: Diabetes/Glucose Control  Goal: Glucose maintained within prescribed range  Description: INTERVENTIONS:  - Monitor Blood Glucose as ordered  - Assess for signs and symptoms of hyperglycemia and hypoglycemia  - Administer ordered medications to maintain glucose within target range  - Assess barriers to adequate nutritional intake and initiate nutrition consult as needed  - Instruct patient on self management of diabetes  Outcome: Progressing     Problem: Patient/Family Goals  Goal: Patient/Family Long Term Goal  Description: Patient's Long Term Goal:     Interventions:  -   - See additional Care Plan goals for specific interventions  Outcome: Progressing  Goal: Patient/Family Short Term Goal  Description: Patient's Short Term Goal:     Interventions:   -   - See additional Care Plan goals for specific interventions  Outcome: Progressing     Problem: PAIN - ADULT  Goal: Verbalizes/displays adequate comfort level or patient's stated pain goal  Description: INTERVENTIONS:  - Encourage pt to monitor pain and request assistance  - Assess pain using appropriate pain scale  - Administer analgesics based on type and severity of pain and evaluate response  - Implement non-pharmacological measures as appropriate and evaluate response  - Consider cultural and social influences on pain and pain management  - Manage/alleviate anxiety  - Utilize distraction and/or relaxation techniques  - Monitor for opioid side effects  - Notify MD/LIP if interventions unsuccessful or patient reports new pain  - Anticipate increased pain with activity and pre-medicate as appropriate  Outcome: Progressing     Problem: RISK FOR INFECTION - ADULT  Goal: Absence of fever/infection during anticipated neutropenic period  Description: INTERVENTIONS  - Monitor WBC  - Administer growth factors as ordered  - Implement neutropenic guidelines  Outcome: Progressing     Problem: DISCHARGE PLANNING  Goal: Discharge to home or other facility with appropriate resources  Description: INTERVENTIONS:  - Identify barriers to discharge w/pt and caregiver  - Include patient/family/discharge partner in discharge planning  - Arrange for needed discharge resources and transportation as appropriate  - Identify discharge learning needs (meds, wound care, etc)  - Arrange for interpreters to assist at discharge as needed  - Consider post-discharge preferences of patient/family/discharge partner  - Complete POLST form as appropriate  - Assess patient's ability to be responsible for managing their own health  - Refer to Case Management Department for coordinating discharge planning if the patient needs post-hospital services based on physician/LIP order or complex needs related to functional status, cognitive ability or social support system  Outcome: Progressing

## 2022-02-13 NOTE — PROGRESS NOTES
[facility] Pharmacy Note:  Renal Dose Adjustment for Tramadol Idalmis Miguel    Jose Lynn III has been prescribed Tramadol (ULTRAM) 50 mg orally every 8 hours as needed for pain. Estimated Creatinine Clearance: 25.2 mL/min (A) (based on SCr of 2.48 mg/dL (H)). His calculated creatinine clearance is < 30 ml/min, therefore, the dose of Tramadol (ULTRAM) has been changed to 50 mg every 12 hours as needed for pain per P&T approved protocol. Pharmacy will continue to follow, and if renal function improves, will resume the original order.      Thank you,  Billy Forbes, PharmD  2/13/2022 7:50 AM Plan to obtain HCP as pt has a partner (?not legally ) once pt willing to discuss further  DNR/DNI confirmed - MOLST reviewed  initiate tylenol 650mg po q6h PRN pain  oxycodone 5mg po q4h PRN pain refractory to tylenol  avoid NSAIDs

## 2022-02-13 NOTE — ED INITIAL ASSESSMENT (HPI)
Pt to ed via ems from local hotel after fall. Per ems, pt fell while transferring from massage chair to wheelchair. Pt sts he fell on his buttocks. Pt sts, \"My legs are weak. \" No neuro deficits noted to ble.  Pt is on eliquis

## 2022-02-13 NOTE — ED QUICK NOTES
Orders for admission, patient is aware of plan and ready to go upstairs. Any questions, please call ED RN Bobbi Bear at extension [phone]. Patient Covid vaccination status: Fully vaccinated     COVID Test Ordered in ED: Rapid SARS-CoV-2 by PCR    COVID Suspicion at Admission: Low clinical suspicion for COVID    Running Infusions:  None    Mental Status/LOC at time of transport: A&Ox4    Other pertinent information: Normally ambulatory w/walker, now utilizing wheelchair.  Continent of bowel/bladder    CIWA score: N/A   NIH score:  N/A

## 2022-02-14 ENCOUNTER — TELEPHONE (OUTPATIENT)
Dept: CARDIOLOGY | Age: 72
End: 2022-02-14

## 2022-02-14 ENCOUNTER — APPOINTMENT (OUTPATIENT)
Dept: ULTRASOUND IMAGING | Facility: HOSPITAL | Age: 72
DRG: 454 | End: 2022-02-14
Attending: NEUROLOGICAL SURGERY
Payer: MEDICARE

## 2022-02-14 LAB
ANION GAP SERPL CALC-SCNC: 4 MMOL/L (ref 0–18)
APTT PPP: 27.9 SECONDS (ref 23.3–35.6)
APTT PPP: 69.9 SECONDS (ref 23.3–35.6)
APTT PPP: 77.7 SECONDS (ref 23.3–35.6)
BASOPHILS # BLD AUTO: 0.01 X10(3) UL (ref 0–0.2)
BASOPHILS NFR BLD AUTO: 0.1 %
BUN BLD-MCNC: 45 MG/DL (ref 7–18)
BUN/CREAT SERPL: 19.1 (ref 10–20)
CALCIUM BLD-MCNC: 8.8 MG/DL (ref 8.5–10.1)
CHLORIDE SERPL-SCNC: 102 MMOL/L (ref 98–112)
CO2 SERPL-SCNC: 32 MMOL/L (ref 21–32)
CREAT BLD-MCNC: 2.35 MG/DL
DEPRECATED RDW RBC AUTO: 46.2 FL (ref 35.1–46.3)
EOSINOPHIL # BLD AUTO: 0.02 X10(3) UL (ref 0–0.7)
EOSINOPHIL NFR BLD AUTO: 0.2 %
ERYTHROCYTE [DISTWIDTH] IN BLOOD BY AUTOMATED COUNT: 13 % (ref 11–15)
GLUCOSE BLD-MCNC: 201 MG/DL (ref 70–99)
GLUCOSE BLDC GLUCOMTR-MCNC: 157 MG/DL (ref 70–99)
GLUCOSE BLDC GLUCOMTR-MCNC: 175 MG/DL (ref 70–99)
GLUCOSE BLDC GLUCOMTR-MCNC: 217 MG/DL (ref 70–99)
GLUCOSE BLDC GLUCOMTR-MCNC: 237 MG/DL (ref 70–99)
GLUCOSE BLDC GLUCOMTR-MCNC: 326 MG/DL (ref 70–99)
HCT VFR BLD AUTO: 42.9 %
HGB BLD-MCNC: 13.7 G/DL
IMM GRANULOCYTES # BLD AUTO: 0.04 X10(3) UL (ref 0–1)
IMM GRANULOCYTES NFR BLD: 0.4 %
LYMPHOCYTES NFR BLD AUTO: 9.4 %
MCH RBC QN AUTO: 31.3 PG (ref 26–34)
MCHC RBC AUTO-ENTMCNC: 31.9 G/DL (ref 31–37)
MCV RBC AUTO: 97.9 FL
MONOCYTES NFR BLD AUTO: 6.7 %
NEUTROPHILS # BLD AUTO: 7.92 X10 (3) UL (ref 1.5–7.7)
NEUTROPHILS # BLD AUTO: 7.92 X10(3) UL (ref 1.5–7.7)
NEUTROPHILS NFR BLD AUTO: 83.2 %
OSMOLALITY SERPL CALC.SUM OF ELEC: 303 MOSM/KG (ref 275–295)
PLATELET # BLD AUTO: 159 10(3)UL (ref 150–450)
POTASSIUM SERPL-SCNC: 4.8 MMOL/L (ref 3.5–5.1)
SODIUM SERPL-SCNC: 138 MMOL/L (ref 136–145)
WBC # BLD AUTO: 9.5 X10(3) UL (ref 4–11)

## 2022-02-14 PROCEDURE — 93923 UPR/LXTR ART STDY 3+ LVLS: CPT | Performed by: NEUROLOGICAL SURGERY

## 2022-02-14 PROCEDURE — 99233 SBSQ HOSP IP/OBS HIGH 50: CPT | Performed by: HOSPITALIST

## 2022-02-14 PROCEDURE — 93880 EXTRACRANIAL BILAT STUDY: CPT | Performed by: NEUROLOGICAL SURGERY

## 2022-02-14 RX ORDER — HEPARIN SODIUM AND DEXTROSE 10000; 5 [USP'U]/100ML; G/100ML
1000 INJECTION INTRAVENOUS ONCE
Status: COMPLETED | OUTPATIENT
Start: 2022-02-14 | End: 2022-02-14

## 2022-02-14 RX ORDER — HEPARIN SODIUM AND DEXTROSE 10000; 5 [USP'U]/100ML; G/100ML
INJECTION INTRAVENOUS CONTINUOUS
Status: DISCONTINUED | OUTPATIENT
Start: 2022-02-14 | End: 2022-02-15

## 2022-02-14 RX ORDER — HEPARIN SODIUM 1000 [USP'U]/ML
5000 INJECTION, SOLUTION INTRAVENOUS; SUBCUTANEOUS ONCE
Status: COMPLETED | OUTPATIENT
Start: 2022-02-14 | End: 2022-02-14

## 2022-02-14 NOTE — OCCUPATIONAL THERAPY NOTE
Occupational therapy orders received, chart review complete. Per EMR pt is off unit, pending carotid dopplers and cardiac clearance for possible spine surgery. Pt also with extensive bilateral cerebellar infarcts. Will hold this AM until POC is established, RN aware.     Tr Khan OTR/L  [facility]   #[id number]

## 2022-02-14 NOTE — PLAN OF CARE
Patient up in chair throughout the day. Up with one assist and walker to bathroom. Heparin drip started. Problem: Patient Centered Care  Goal: Patient preferences are identified and integrated in the patient's plan of care  Description: Interventions:  - What would you like us to know as we care for you?  I fell at home  - Provide timely, complete, and accurate information to patient/family  - Incorporate patient and family knowledge, values, beliefs, and cultural backgrounds into the planning and delivery of care  - Encourage patient/family to participate in care and decision-making at the level they choose  - Honor patient and family perspectives and choices  Outcome: Progressing     Problem: Diabetes/Glucose Control  Goal: Glucose maintained within prescribed range  Description: INTERVENTIONS:  - Monitor Blood Glucose as ordered  - Assess for signs and symptoms of hyperglycemia and hypoglycemia  - Administer ordered medications to maintain glucose within target range  - Assess barriers to adequate nutritional intake and initiate nutrition consult as needed  - Instruct patient on self management of diabetes  Outcome: Progressing     Problem: Patient/Family Goals  Goal: Patient/Family Long Term Goal  Description: Patient's Long Term Goal: Go home    Interventions:  - Possible surgery  - See additional Care Plan goals for specific interventions  Outcome: Progressing  Goal: Patient/Family Short Term Goal  Description: Patient's Short Term Goal: Pain management    Interventions:   - Oral medications  - Non-pharmacologic intervention  - See additional Care Plan goals for specific interventions  Outcome: Progressing     Problem: PAIN - ADULT  Goal: Verbalizes/displays adequate comfort level or patient's stated pain goal  Description: INTERVENTIONS:  - Encourage pt to monitor pain and request assistance  - Assess pain using appropriate pain scale  - Administer analgesics based on type and severity of pain and evaluate response  - Implement non-pharmacological measures as appropriate and evaluate response  - Consider cultural and social influences on pain and pain management  - Manage/alleviate anxiety  - Utilize distraction and/or relaxation techniques  - Monitor for opioid side effects  - Notify MD/LIP if interventions unsuccessful or patient reports new pain  - Anticipate increased pain with activity and pre-medicate as appropriate  Outcome: Progressing     Problem: RISK FOR INFECTION - ADULT  Goal: Absence of fever/infection during anticipated neutropenic period  Description: INTERVENTIONS  - Monitor WBC  - Administer growth factors as ordered  - Implement neutropenic guidelines  Outcome: Progressing     Problem: DISCHARGE PLANNING  Goal: Discharge to home or other facility with appropriate resources  Description: INTERVENTIONS:  - Identify barriers to discharge w/pt and caregiver  - Include patient/family/discharge partner in discharge planning  - Arrange for needed discharge resources and transportation as appropriate  - Identify discharge learning needs (meds, wound care, etc)  - Arrange for interpreters to assist at discharge as needed  - Consider post-discharge preferences of patient/family/discharge partner  - Complete POLST form as appropriate  - Assess patient's ability to be responsible for managing their own health  - Refer to Case Management Department for coordinating discharge planning if the patient needs post-hospital services based on physician/LIP order or complex needs related to functional status, cognitive ability or social support system  Outcome: Progressing

## 2022-02-14 NOTE — PROGRESS NOTES
[facility] was going to contact the patient to follow up on [facility] services and surgery scheduling. The patient was readmitted on 2/13/2022.

## 2022-02-14 NOTE — PHYSICAL THERAPY NOTE
Physical therapy orders received, chart review complete. Per EMR pt is off unit, pending carotid dopplers and cardiac clearance for possible spine surgery. Pt also with extensive bilateral cerebellar infarcts. Will hold this AM until POC is established, RN aware.     Walter Barton, PT  2/14/2022

## 2022-02-15 ENCOUNTER — ANESTHESIA EVENT (OUTPATIENT)
Dept: SURGERY | Facility: HOSPITAL | Age: 72
DRG: 454 | End: 2022-02-15
Payer: MEDICARE

## 2022-02-15 LAB
ANION GAP SERPL CALC-SCNC: 7 MMOL/L (ref 0–18)
APTT PPP: 60.8 SECONDS (ref 23.3–35.6)
BASOPHILS # BLD AUTO: 0.03 X10(3) UL (ref 0–0.2)
BASOPHILS NFR BLD AUTO: 0.4 %
BUN BLD-MCNC: 40 MG/DL (ref 7–18)
BUN/CREAT SERPL: 18 (ref 10–20)
CALCIUM BLD-MCNC: 8.6 MG/DL (ref 8.5–10.1)
CHLORIDE SERPL-SCNC: 102 MMOL/L (ref 98–112)
CO2 SERPL-SCNC: 34 MMOL/L (ref 21–32)
CREAT BLD-MCNC: 2.22 MG/DL
DEPRECATED RDW RBC AUTO: 47.7 FL (ref 35.1–46.3)
EOSINOPHIL # BLD AUTO: 0.12 X10(3) UL (ref 0–0.7)
EOSINOPHIL NFR BLD AUTO: 1.6 %
ERYTHROCYTE [DISTWIDTH] IN BLOOD BY AUTOMATED COUNT: 13.3 % (ref 11–15)
GLUCOSE BLD-MCNC: 108 MG/DL (ref 70–99)
GLUCOSE BLDC GLUCOMTR-MCNC: 188 MG/DL (ref 70–99)
GLUCOSE BLDC GLUCOMTR-MCNC: 91 MG/DL (ref 70–99)
GLUCOSE BLDC GLUCOMTR-MCNC: 96 MG/DL (ref 70–99)
HCT VFR BLD AUTO: 41.6 %
HGB BLD-MCNC: 13.3 G/DL
IMM GRANULOCYTES # BLD AUTO: 0.02 X10(3) UL (ref 0–1)
IMM GRANULOCYTES NFR BLD: 0.3 %
LYMPHOCYTES # BLD AUTO: 1.96 X10(3) UL (ref 1–4)
LYMPHOCYTES NFR BLD AUTO: 25.6 %
MCHC RBC AUTO-ENTMCNC: 32 G/DL (ref 31–37)
MCV RBC AUTO: 97.7 FL
MONOCYTES # BLD AUTO: 0.59 X10(3) UL (ref 0.1–1)
MONOCYTES NFR BLD AUTO: 7.7 %
NEUTROPHILS # BLD AUTO: 4.93 X10 (3) UL (ref 1.5–7.7)
NEUTROPHILS # BLD AUTO: 4.93 X10(3) UL (ref 1.5–7.7)
NEUTROPHILS NFR BLD AUTO: 64.4 %
OSMOLALITY SERPL CALC.SUM OF ELEC: 306 MOSM/KG (ref 275–295)
PLATELET # BLD AUTO: 154 10(3)UL (ref 150–450)
POTASSIUM SERPL-SCNC: 4.2 MMOL/L (ref 3.5–5.1)
RBC # BLD AUTO: 4.26 X10(6)UL
SODIUM SERPL-SCNC: 143 MMOL/L (ref 136–145)
WBC # BLD AUTO: 7.7 X10(3) UL (ref 4–11)

## 2022-02-15 PROCEDURE — 99233 SBSQ HOSP IP/OBS HIGH 50: CPT | Performed by: HOSPITALIST

## 2022-02-15 RX ORDER — LOSARTAN POTASSIUM 100 MG/1
100 TABLET ORAL NIGHTLY
Status: DISCONTINUED | OUTPATIENT
Start: 2022-02-15 | End: 2022-02-22

## 2022-02-15 RX ORDER — FUROSEMIDE 20 MG/1
20 TABLET ORAL 2 TIMES DAILY
Status: DISCONTINUED | OUTPATIENT
Start: 2022-02-15 | End: 2022-02-22

## 2022-02-15 RX ORDER — IRBESARTAN 300 MG/1
300 TABLET ORAL NIGHTLY
COMMUNITY
Start: 2021-01-25 | End: 2022-02-22

## 2022-02-15 NOTE — PHYSICAL THERAPY NOTE
PT evaluation orders received and chart reviewed. Patient has scheduled L4-5 and L5-S1 TLIF on 2/16/22. Will require updated activity orders s/p procedure.      Thank you,  Bernard Spine, PT, DPT

## 2022-02-16 ENCOUNTER — ANESTHESIA (OUTPATIENT)
Dept: SURGERY | Facility: HOSPITAL | Age: 72
DRG: 454 | End: 2022-02-16
Payer: MEDICARE

## 2022-02-16 ENCOUNTER — APPOINTMENT (OUTPATIENT)
Dept: GENERAL RADIOLOGY | Facility: HOSPITAL | Age: 72
DRG: 454 | End: 2022-02-16
Attending: NEUROLOGICAL SURGERY
Payer: MEDICARE

## 2022-02-16 LAB
ANION GAP SERPL CALC-SCNC: 7 MMOL/L (ref 0–18)
ANTIBODY SCREEN: NEGATIVE
APTT PPP: 28.6 SECONDS (ref 23.3–35.6)
BASOPHILS # BLD AUTO: 0.02 X10(3) UL (ref 0–0.2)
BASOPHILS NFR BLD AUTO: 0.3 %
BUN BLD-MCNC: 42 MG/DL (ref 7–18)
BUN/CREAT SERPL: 19.5 (ref 10–20)
CALCIUM BLD-MCNC: 8.8 MG/DL (ref 8.5–10.1)
CHLORIDE SERPL-SCNC: 99 MMOL/L (ref 98–112)
CO2 SERPL-SCNC: 32 MMOL/L (ref 21–32)
CREAT BLD-MCNC: 2.15 MG/DL
DEPRECATED RDW RBC AUTO: 47.3 FL (ref 35.1–46.3)
EOSINOPHIL # BLD AUTO: 0.13 X10(3) UL (ref 0–0.7)
EOSINOPHIL NFR BLD AUTO: 1.9 %
ERYTHROCYTE [DISTWIDTH] IN BLOOD BY AUTOMATED COUNT: 13.2 % (ref 11–15)
GLUCOSE BLDC GLUCOMTR-MCNC: 133 MG/DL (ref 70–99)
GLUCOSE BLDC GLUCOMTR-MCNC: 143 MG/DL (ref 70–99)
GLUCOSE BLDC GLUCOMTR-MCNC: 215 MG/DL (ref 70–99)
GLUCOSE BLDC GLUCOMTR-MCNC: 339 MG/DL (ref 70–99)
GLUCOSE BLDC GLUCOMTR-MCNC: 345 MG/DL (ref 70–99)
HCT VFR BLD AUTO: 42.6 %
HGB BLD-MCNC: 13.8 G/DL
IMM GRANULOCYTES # BLD AUTO: 0.03 X10(3) UL (ref 0–1)
IMM GRANULOCYTES NFR BLD: 0.4 %
LYMPHOCYTES # BLD AUTO: 1.86 X10(3) UL (ref 1–4)
LYMPHOCYTES NFR BLD AUTO: 27.8 %
MCH RBC QN AUTO: 31.7 PG (ref 26–34)
MCHC RBC AUTO-ENTMCNC: 32.4 G/DL (ref 31–37)
MCV RBC AUTO: 97.9 FL
MONOCYTES # BLD AUTO: 0.56 X10(3) UL (ref 0.1–1)
MONOCYTES NFR BLD AUTO: 8.4 %
MRSA DNA SPEC QL NAA+PROBE: NEGATIVE
NEUTROPHILS # BLD AUTO: 4.08 X10 (3) UL (ref 1.5–7.7)
NEUTROPHILS # BLD AUTO: 4.08 X10(3) UL (ref 1.5–7.7)
NEUTROPHILS NFR BLD AUTO: 61.2 %
OSMOLALITY SERPL CALC.SUM OF ELEC: 299 MOSM/KG (ref 275–295)
PLATELET # BLD AUTO: 147 10(3)UL (ref 150–450)
POTASSIUM SERPL-SCNC: 4.5 MMOL/L (ref 3.5–5.1)
RBC # BLD AUTO: 4.35 X10(6)UL
RH BLOOD TYPE: POSITIVE
SODIUM SERPL-SCNC: 138 MMOL/L (ref 136–145)
WBC # BLD AUTO: 6.7 X10(3) UL (ref 4–11)

## 2022-02-16 PROCEDURE — 0ST40ZZ RESECTION OF LUMBOSACRAL DISC, OPEN APPROACH: ICD-10-PCS | Performed by: NEUROLOGICAL SURGERY

## 2022-02-16 PROCEDURE — 0SG30AJ FUSION OF LUMBOSACRAL JOINT WITH INTERBODY FUSION DEVICE, POSTERIOR APPROACH, ANTERIOR COLUMN, OPEN APPROACH: ICD-10-PCS | Performed by: NEUROLOGICAL SURGERY

## 2022-02-16 PROCEDURE — 0SG3071 FUSION OF LUMBOSACRAL JOINT WITH AUTOLOGOUS TISSUE SUBSTITUTE, POSTERIOR APPROACH, POSTERIOR COLUMN, OPEN APPROACH: ICD-10-PCS | Performed by: NEUROLOGICAL SURGERY

## 2022-02-16 PROCEDURE — 01NR0ZZ RELEASE SACRAL NERVE, OPEN APPROACH: ICD-10-PCS | Performed by: NEUROLOGICAL SURGERY

## 2022-02-16 PROCEDURE — 0SG0071 FUSION OF LUMBAR VERTEBRAL JOINT WITH AUTOLOGOUS TISSUE SUBSTITUTE, POSTERIOR APPROACH, POSTERIOR COLUMN, OPEN APPROACH: ICD-10-PCS | Performed by: NEUROLOGICAL SURGERY

## 2022-02-16 PROCEDURE — 4A11X4G MONITORING OF PERIPHERAL NERVOUS ELECTRICAL ACTIVITY, INTRAOPERATIVE, EXTERNAL APPROACH: ICD-10-PCS | Performed by: NEUROLOGICAL SURGERY

## 2022-02-16 PROCEDURE — 0ST20ZZ RESECTION OF LUMBAR VERTEBRAL DISC, OPEN APPROACH: ICD-10-PCS | Performed by: NEUROLOGICAL SURGERY

## 2022-02-16 PROCEDURE — 0SG00AJ FUSION OF LUMBAR VERTEBRAL JOINT WITH INTERBODY FUSION DEVICE, POSTERIOR APPROACH, ANTERIOR COLUMN, OPEN APPROACH: ICD-10-PCS | Performed by: NEUROLOGICAL SURGERY

## 2022-02-16 PROCEDURE — 99233 SBSQ HOSP IP/OBS HIGH 50: CPT | Performed by: HOSPITALIST

## 2022-02-16 PROCEDURE — 01NB0ZZ RELEASE LUMBAR NERVE, OPEN APPROACH: ICD-10-PCS | Performed by: NEUROLOGICAL SURGERY

## 2022-02-16 PROCEDURE — 76000 FLUOROSCOPY <1 HR PHYS/QHP: CPT | Performed by: NEUROLOGICAL SURGERY

## 2022-02-16 DEVICE — RELINE MAS MOD REDUCTION EXT: Type: IMPLANTABLE DEVICE | Site: BACK | Status: FUNCTIONAL

## 2022-02-16 DEVICE — OSTEOCEL PRO LARGE BULK BUY: Type: IMPLANTABLE DEVICE | Site: BACK | Status: FUNCTIONAL

## 2022-02-16 DEVICE — TLX20, 9X11X31MM 20°
Type: IMPLANTABLE DEVICE | Site: BACK | Status: FUNCTIONAL
Brand: TLX

## 2022-02-16 DEVICE — RELINE MAS MOD SCREW 7.5X35 2C: Type: IMPLANTABLE DEVICE | Site: BACK | Status: FUNCTIONAL

## 2022-02-16 DEVICE — RELINE LCK SCRW 5.5 OPEN TULIP: Type: IMPLANTABLE DEVICE | Site: BACK | Status: FUNCTIONAL

## 2022-02-16 DEVICE — RELINE MAS TI ROD 5.5X60 LRDTC: Type: IMPLANTABLE DEVICE | Site: BACK | Status: FUNCTIONAL

## 2022-02-16 DEVICE — TLX20, 7X11X31MM 20°
Type: IMPLANTABLE DEVICE | Site: BACK | Status: FUNCTIONAL
Brand: TLX

## 2022-02-16 DEVICE — RELINE MAS MOD SCREW 6.5X35 2C: Type: IMPLANTABLE DEVICE | Site: BACK | Status: FUNCTIONAL

## 2022-02-16 DEVICE — BONE GRAFT KIT 7510200 INFUSE SMALL
Type: IMPLANTABLE DEVICE | Site: BACK | Status: FUNCTIONAL
Brand: INFUSE® BONE GRAFT

## 2022-02-16 RX ORDER — ONDANSETRON 2 MG/ML
4 INJECTION INTRAMUSCULAR; INTRAVENOUS ONCE AS NEEDED
Status: DISCONTINUED | OUTPATIENT
Start: 2022-02-16 | End: 2022-02-16 | Stop reason: HOSPADM

## 2022-02-16 RX ORDER — ONDANSETRON 2 MG/ML
INJECTION INTRAMUSCULAR; INTRAVENOUS AS NEEDED
Status: DISCONTINUED | OUTPATIENT
Start: 2022-02-16 | End: 2022-02-16 | Stop reason: SURG

## 2022-02-16 RX ORDER — MORPHINE SULFATE 4 MG/ML
4 INJECTION, SOLUTION INTRAMUSCULAR; INTRAVENOUS EVERY 10 MIN PRN
Status: DISCONTINUED | OUTPATIENT
Start: 2022-02-16 | End: 2022-02-16 | Stop reason: HOSPADM

## 2022-02-16 RX ORDER — SODIUM CHLORIDE, SODIUM LACTATE, POTASSIUM CHLORIDE, CALCIUM CHLORIDE 600; 310; 30; 20 MG/100ML; MG/100ML; MG/100ML; MG/100ML
INJECTION, SOLUTION INTRAVENOUS CONTINUOUS
Status: DISCONTINUED | OUTPATIENT
Start: 2022-02-16 | End: 2022-02-16 | Stop reason: HOSPADM

## 2022-02-16 RX ORDER — NALOXONE HYDROCHLORIDE 0.4 MG/ML
80 INJECTION, SOLUTION INTRAMUSCULAR; INTRAVENOUS; SUBCUTANEOUS AS NEEDED
Status: DISCONTINUED | OUTPATIENT
Start: 2022-02-16 | End: 2022-02-16 | Stop reason: HOSPADM

## 2022-02-16 RX ORDER — DEXAMETHASONE SODIUM PHOSPHATE 4 MG/ML
VIAL (ML) INJECTION AS NEEDED
Status: DISCONTINUED | OUTPATIENT
Start: 2022-02-16 | End: 2022-02-16 | Stop reason: SURG

## 2022-02-16 RX ORDER — METHOCARBAMOL 500 MG/1
1000 TABLET, FILM COATED ORAL 2 TIMES DAILY
Status: DISCONTINUED | OUTPATIENT
Start: 2022-02-16 | End: 2022-02-22

## 2022-02-16 RX ORDER — MORPHINE SULFATE 4 MG/ML
6 INJECTION, SOLUTION INTRAMUSCULAR; INTRAVENOUS EVERY 2 HOUR PRN
Status: DISCONTINUED | OUTPATIENT
Start: 2022-02-16 | End: 2022-02-22

## 2022-02-16 RX ORDER — BUPIVACAINE HYDROCHLORIDE AND EPINEPHRINE 2.5; 5 MG/ML; UG/ML
INJECTION, SOLUTION INFILTRATION; PERINEURAL AS NEEDED
Status: DISCONTINUED | OUTPATIENT
Start: 2022-02-16 | End: 2022-02-16 | Stop reason: HOSPADM

## 2022-02-16 RX ORDER — LIDOCAINE HYDROCHLORIDE 20 MG/ML
INJECTION, SOLUTION EPIDURAL; INFILTRATION; INTRACAUDAL; PERINEURAL AS NEEDED
Status: DISCONTINUED | OUTPATIENT
Start: 2022-02-16 | End: 2022-02-16 | Stop reason: SURG

## 2022-02-16 RX ORDER — HYDROMORPHONE HYDROCHLORIDE 1 MG/ML
0.4 INJECTION, SOLUTION INTRAMUSCULAR; INTRAVENOUS; SUBCUTANEOUS EVERY 5 MIN PRN
Status: DISCONTINUED | OUTPATIENT
Start: 2022-02-16 | End: 2022-02-16 | Stop reason: HOSPADM

## 2022-02-16 RX ORDER — HYDROCODONE BITARTRATE AND ACETAMINOPHEN 5; 325 MG/1; MG/1
2 TABLET ORAL AS NEEDED
Status: DISCONTINUED | OUTPATIENT
Start: 2022-02-16 | End: 2022-02-16 | Stop reason: HOSPADM

## 2022-02-16 RX ORDER — MORPHINE SULFATE 4 MG/ML
4 INJECTION, SOLUTION INTRAMUSCULAR; INTRAVENOUS EVERY 2 HOUR PRN
Status: DISCONTINUED | OUTPATIENT
Start: 2022-02-16 | End: 2022-02-22

## 2022-02-16 RX ORDER — CEFAZOLIN SODIUM/WATER 2 G/20 ML
2 SYRINGE (ML) INTRAVENOUS ONCE
Status: COMPLETED | OUTPATIENT
Start: 2022-02-16 | End: 2022-02-16

## 2022-02-16 RX ORDER — EPHEDRINE SULFATE 50 MG/ML
INJECTION, SOLUTION INTRAVENOUS AS NEEDED
Status: DISCONTINUED | OUTPATIENT
Start: 2022-02-16 | End: 2022-02-16 | Stop reason: SURG

## 2022-02-16 RX ORDER — HALOPERIDOL 5 MG/ML
0.25 INJECTION INTRAMUSCULAR ONCE AS NEEDED
Status: DISCONTINUED | OUTPATIENT
Start: 2022-02-16 | End: 2022-02-16 | Stop reason: HOSPADM

## 2022-02-16 RX ORDER — MORPHINE SULFATE 4 MG/ML
2 INJECTION, SOLUTION INTRAMUSCULAR; INTRAVENOUS EVERY 2 HOUR PRN
Status: DISCONTINUED | OUTPATIENT
Start: 2022-02-16 | End: 2022-02-22

## 2022-02-16 RX ORDER — SODIUM CHLORIDE 9 MG/ML
INJECTION, SOLUTION INTRAVENOUS CONTINUOUS PRN
Status: DISCONTINUED | OUTPATIENT
Start: 2022-02-16 | End: 2022-02-16 | Stop reason: SURG

## 2022-02-16 RX ORDER — MORPHINE SULFATE 10 MG/ML
6 INJECTION, SOLUTION INTRAMUSCULAR; INTRAVENOUS EVERY 10 MIN PRN
Status: DISCONTINUED | OUTPATIENT
Start: 2022-02-16 | End: 2022-02-16 | Stop reason: HOSPADM

## 2022-02-16 RX ORDER — MORPHINE SULFATE 4 MG/ML
2 INJECTION, SOLUTION INTRAMUSCULAR; INTRAVENOUS EVERY 10 MIN PRN
Status: DISCONTINUED | OUTPATIENT
Start: 2022-02-16 | End: 2022-02-16 | Stop reason: HOSPADM

## 2022-02-16 RX ORDER — PROCHLORPERAZINE EDISYLATE 5 MG/ML
5 INJECTION INTRAMUSCULAR; INTRAVENOUS ONCE AS NEEDED
Status: DISCONTINUED | OUTPATIENT
Start: 2022-02-16 | End: 2022-02-16 | Stop reason: HOSPADM

## 2022-02-16 RX ORDER — OXYCODONE HCL 10 MG/1
10 TABLET, FILM COATED, EXTENDED RELEASE ORAL EVERY 12 HOURS
Status: DISCONTINUED | OUTPATIENT
Start: 2022-02-16 | End: 2022-02-22

## 2022-02-16 RX ORDER — HYDROMORPHONE HYDROCHLORIDE 1 MG/ML
0.2 INJECTION, SOLUTION INTRAMUSCULAR; INTRAVENOUS; SUBCUTANEOUS EVERY 5 MIN PRN
Status: DISCONTINUED | OUTPATIENT
Start: 2022-02-16 | End: 2022-02-16 | Stop reason: HOSPADM

## 2022-02-16 RX ORDER — HYDROMORPHONE HYDROCHLORIDE 1 MG/ML
0.6 INJECTION, SOLUTION INTRAMUSCULAR; INTRAVENOUS; SUBCUTANEOUS EVERY 5 MIN PRN
Status: DISCONTINUED | OUTPATIENT
Start: 2022-02-16 | End: 2022-02-16 | Stop reason: HOSPADM

## 2022-02-16 RX ORDER — MIDAZOLAM HYDROCHLORIDE 1 MG/ML
INJECTION INTRAMUSCULAR; INTRAVENOUS AS NEEDED
Status: DISCONTINUED | OUTPATIENT
Start: 2022-02-16 | End: 2022-02-16 | Stop reason: SURG

## 2022-02-16 RX ORDER — GLYCOPYRROLATE 0.2 MG/ML
INJECTION, SOLUTION INTRAMUSCULAR; INTRAVENOUS AS NEEDED
Status: DISCONTINUED | OUTPATIENT
Start: 2022-02-16 | End: 2022-02-16 | Stop reason: SURG

## 2022-02-16 RX ORDER — HYDROCODONE BITARTRATE AND ACETAMINOPHEN 5; 325 MG/1; MG/1
1 TABLET ORAL AS NEEDED
Status: DISCONTINUED | OUTPATIENT
Start: 2022-02-16 | End: 2022-02-16 | Stop reason: HOSPADM

## 2022-02-16 RX ORDER — METHOCARBAMOL 500 MG/1
1000 TABLET, FILM COATED ORAL 2 TIMES DAILY
Status: DISCONTINUED | OUTPATIENT
Start: 2022-02-16 | End: 2022-02-16

## 2022-02-16 RX ADMIN — SODIUM CHLORIDE: 9 INJECTION, SOLUTION INTRAVENOUS at 13:39:00

## 2022-02-16 RX ADMIN — EPHEDRINE SULFATE 10 MG: 50 INJECTION, SOLUTION INTRAVENOUS at 10:23:00

## 2022-02-16 RX ADMIN — SODIUM CHLORIDE: 9 INJECTION, SOLUTION INTRAVENOUS at 10:14:00

## 2022-02-16 RX ADMIN — ONDANSETRON 4 MG: 2 INJECTION INTRAMUSCULAR; INTRAVENOUS at 10:23:00

## 2022-02-16 RX ADMIN — GLYCOPYRROLATE 0.2 MG: 0.2 INJECTION, SOLUTION INTRAMUSCULAR; INTRAVENOUS at 10:23:00

## 2022-02-16 RX ADMIN — CEFAZOLIN SODIUM/WATER 2 G: 2 G/20 ML SYRINGE (ML) INTRAVENOUS at 10:29:00

## 2022-02-16 RX ADMIN — LIDOCAINE HYDROCHLORIDE 80 MG: 20 INJECTION, SOLUTION EPIDURAL; INFILTRATION; INTRACAUDAL; PERINEURAL at 10:23:00

## 2022-02-16 RX ADMIN — DEXAMETHASONE SODIUM PHOSPHATE 4 MG: 4 MG/ML VIAL (ML) INJECTION at 10:23:00

## 2022-02-16 RX ADMIN — MIDAZOLAM HYDROCHLORIDE 2 MG: 1 INJECTION INTRAMUSCULAR; INTRAVENOUS at 10:14:00

## 2022-02-16 RX ADMIN — EPHEDRINE SULFATE 10 MG: 50 INJECTION, SOLUTION INTRAVENOUS at 10:32:00

## 2022-02-16 NOTE — PLAN OF CARE
Vivien Strange M.D., F.A.A.N.S.  of [facility] [address]  Board Certified Neurosurgeon                                     [facility]        [address], [address] [place] includes the [facility]    PHONE  [phone]    [email or web address]           2/16/2022  9:21 AM    PRE-OPERATIVE CONSULTATION     Yayo Parker III was again informed of the nature of the problem, planned treatment, indications and alternatives. We again reviewed the expected benefits of surgery, as well as all reasonably foreseeable risks, including but not limited to infection, bleeding requiring transfusion or reoperation, no relief or worsening of the pain, numbness, weakness, need for assistive devices to get around, injury to the nerves, paralysis, loss of control of the legs/bladder/bowel/sexual function, spinal fluid leak, scar formation, failure of the fusion to heal (made more likely with smoking,) bad position of the screws or cages, migration of the screws or cages, break of the screws or rods, problems above or below the level of fusion due to increased stress on those levels from the fusion and leading to more pain and possibly the need for more surgery, heart attack, blood clot formation, pulmonary embolism, stroke, coma and death. his questions were all answered and he understands what we discussed. he also understands that no guarantees can be made regarding outcome or results. Bud Ortiz agrees the benefits of surgery outweigh the risks, and wishes to proceed with surgery.       Víctor Landaverde M.D., F.A.A.N.S.

## 2022-02-16 NOTE — ANESTHESIA PROCEDURE NOTES
Airway  Date/Time: 2/16/2022 10:28 AM  Urgency: Elective      General Information and Staff    Patient location during procedure: OR  Anesthesiologist: Mercedes Ward MD  Resident/CRNA: Moisés Acosta CRNA  Performed: CRNA     Indications and Patient Condition  Indications for airway management: anesthesia  Sedation level: deep  Preoxygenated: yes  Patient position: sniffing  Mask difficulty assessment: 2 - vent by mask + OA or adjuvant +/- NMBA    Final Airway Details  Final airway type: endotracheal airway      Successful airway: ETT  Cuffed: yes   Successful intubation technique: Video laryngoscopy  Endotracheal tube insertion site: oral  Blade: GlideScope  Blade size: #4  ETT size (mm): 8.0    Cormack-Lehane Classification: grade I - full view of glottis  Placement verified by: chest auscultation and capnometry   Measured from: teeth  ETT to teeth (cm): 21  Number of attempts at approach: 1    Additional Comments  Poor view with DL MAC 4, easy glidescope.

## 2022-02-16 NOTE — ANESTHESIA PROCEDURE NOTES
Peripheral IV  Date/Time: 2/16/2022 10:38 AM  Inserted by: Richelle Mao CRNA    Placement  Needle size: 20 G  Laterality: right  Location: forearm  Site prep: alcohol  Technique: anatomical landmarks  Attempts: 2

## 2022-02-16 NOTE — OCCUPATIONAL THERAPY NOTE
Spine surgery scheduled today. Will need new orders post operatively.      [facility], OTR/L ext [phone]

## 2022-02-16 NOTE — PLAN OF CARE
Patient alert and oriented x4, on room air, ambulating with x2 assist, stand pivot to rolling chair to bathroom. Voiding via lyle. Vital signs stable. Gel ice in place. Neuro checks completed, baseline numbness to both feet, patient unsteady on feet after surgery, not able to take full steps. Swelling to b/l lower legs and feet. Patient somewhat impulsive, bed and chair alarm on. Dressing to L foot for blister that was present upon admission. Incision to low back approximated, clean dry and intact. Drain dressing clean dry intact, to bulb suction. SCDs and Teds for DVT prophylaxis. Pain controlled with oxy. Fall and spine precautions maintained. Plan for discharge pending medical and ortho course. Disease process discussed with patient. Bed in lowest position, call light within reach, safety measures in place.      Problem: Patient Centered Care  Goal: Patient preferences are identified and integrated in the patient's plan of care  Description: Interventions:  - What would you like us to know as we care for you?   - Provide timely, complete, and accurate information to patient/family  - Incorporate patient and family knowledge, values, beliefs, and cultural backgrounds into the planning and delivery of care  - Encourage patient/family to participate in care and decision-making at the level they choose  - Honor patient and family perspectives and choices  Outcome: Progressing     Problem: Diabetes/Glucose Control  Goal: Glucose maintained within prescribed range  Description: INTERVENTIONS:  - Monitor Blood Glucose as ordered  - Assess for signs and symptoms of hyperglycemia and hypoglycemia  - Administer ordered medications to maintain glucose within target range  - Assess barriers to adequate nutritional intake and initiate nutrition consult as needed  - Instruct patient on self management of diabetes  Outcome: Progressing     Problem: Patient/Family Goals  Goal: Patient/Family Long Term Goal  Description: Patient's Long Term Goal:     Interventions:  -   - See additional Care Plan goals for specific interventions  Outcome: Progressing  Goal: Patient/Family Short Term Goal  Description: Patient's Short Term Goal:     Interventions:   -   - See additional Care Plan goals for specific interventions  Outcome: Progressing     Problem: PAIN - ADULT  Goal: Verbalizes/displays adequate comfort level or patient's stated pain goal  Description: INTERVENTIONS:  - Encourage pt to monitor pain and request assistance  - Assess pain using appropriate pain scale  - Administer analgesics based on type and severity of pain and evaluate response  - Implement non-pharmacological measures as appropriate and evaluate response  - Consider cultural and social influences on pain and pain management  - Manage/alleviate anxiety  - Utilize distraction and/or relaxation techniques  - Monitor for opioid side effects  - Notify MD/LIP if interventions unsuccessful or patient reports new pain  - Anticipate increased pain with activity and pre-medicate as appropriate  Outcome: Progressing     Problem: RISK FOR INFECTION - ADULT  Goal: Absence of fever/infection during anticipated neutropenic period  Description: INTERVENTIONS  - Monitor WBC  - Administer growth factors as ordered  - Implement neutropenic guidelines  Outcome: Progressing     Problem: DISCHARGE PLANNING  Goal: Discharge to home or other facility with appropriate resources  Description: INTERVENTIONS:  - Identify barriers to discharge w/pt and caregiver  - Include patient/family/discharge partner in discharge planning  - Arrange for needed discharge resources and transportation as appropriate  - Identify discharge learning needs (meds, wound care, etc)  - Arrange for interpreters to assist at discharge as needed  - Consider post-discharge preferences of patient/family/discharge partner  - Complete POLST form as appropriate  - Assess patient's ability to be responsible for managing their own health  - Refer to Case Management Department for coordinating discharge planning if the patient needs post-hospital services based on physician/LIP order or complex needs related to functional status, cognitive ability or social support system  Outcome: Progressing

## 2022-02-16 NOTE — PLAN OF CARE
Patient alert and oriented x4, on room air, ambulating with standby. Voiding freely, BM yesterday. Vital signs stable. Neuro checks completed, bilateral baseline numbness to lower extremities. Teds and SCDs for DVT prophylaxis. Patient denies needing pain medication today. fall precautions maintained. Heparin held per order today, plan for surgery tomorrow. NPO as of midnight. Accuchecks completed ACHS. Plan for discharge pending medical course. Disease process discussed with patient. Bed in lowest position, call light within reach, safety measures in place.      Problem: Patient Centered Care  Goal: Patient preferences are identified and integrated in the patient's plan of care  Description: Interventions:  - What would you like us to know as we care for you?   - Provide timely, complete, and accurate information to patient/family  - Incorporate patient and family knowledge, values, beliefs, and cultural backgrounds into the planning and delivery of care  - Encourage patient/family to participate in care and decision-making at the level they choose  - Honor patient and family perspectives and choices  Outcome: Progressing     Problem: Diabetes/Glucose Control  Goal: Glucose maintained within prescribed range  Description: INTERVENTIONS:  - Monitor Blood Glucose as ordered  - Assess for signs and symptoms of hyperglycemia and hypoglycemia  - Administer ordered medications to maintain glucose within target range  - Assess barriers to adequate nutritional intake and initiate nutrition consult as needed  - Instruct patient on self management of diabetes  Outcome: Progressing     Problem: Patient/Family Goals  Goal: Patient/Family Long Term Goal  Description: Patient's Long Term Goal:     Interventions:  -   - See additional Care Plan goals for specific interventions  Outcome: Progressing  Goal: Patient/Family Short Term Goal  Description: Patient's Short Term Goal:     Interventions:   -   - See additional Care Plan goals for specific interventions  Outcome: Progressing     Problem: PAIN - ADULT  Goal: Verbalizes/displays adequate comfort level or patient's stated pain goal  Description: INTERVENTIONS:  - Encourage pt to monitor pain and request assistance  - Assess pain using appropriate pain scale  - Administer analgesics based on type and severity of pain and evaluate response  - Implement non-pharmacological measures as appropriate and evaluate response  - Consider cultural and social influences on pain and pain management  - Manage/alleviate anxiety  - Utilize distraction and/or relaxation techniques  - Monitor for opioid side effects  - Notify MD/LIP if interventions unsuccessful or patient reports new pain  - Anticipate increased pain with activity and pre-medicate as appropriate  Outcome: Progressing     Problem: RISK FOR INFECTION - ADULT  Goal: Absence of fever/infection during anticipated neutropenic period  Description: INTERVENTIONS  - Monitor WBC  - Administer growth factors as ordered  - Implement neutropenic guidelines  Outcome: Progressing     Problem: DISCHARGE PLANNING  Goal: Discharge to home or other facility with appropriate resources  Description: INTERVENTIONS:  - Identify barriers to discharge w/pt and caregiver  - Include patient/family/discharge partner in discharge planning  - Arrange for needed discharge resources and transportation as appropriate  - Identify discharge learning needs (meds, wound care, etc)  - Arrange for interpreters to assist at discharge as needed  - Consider post-discharge preferences of patient/family/discharge partner  - Complete POLST form as appropriate  - Assess patient's ability to be responsible for managing their own health  - Refer to Case Management Department for coordinating discharge planning if the patient needs post-hospital services based on physician/LIP order or complex needs related to functional status, cognitive ability or social support system  Outcome: Progressing

## 2022-02-16 NOTE — OPERATIVE REPORT
OPERATIVE REPORT    PATIENT NAME: Cristina Phalen III    DATE OF OPERATION:  2/16/2022    PREOPERATIVE DIAGNOSIS:  1. Lumbar stenosis with neurogenic claudication               POSTOPERATIVE DIAGNOSIS: 1. same                   OPERATIVE PROCEDURE:  1.  Lumbar 4 through S1 transforaminal intervertebral arthrodesis and fusion, utilizing 2 interbodies/cages and allograft and locally-harvested morselized autograft  2. Lumbar 4 through S1 laminectomy and right complete facetectomy and left posterolateral arthrodesis, utilizing locally-harvested morselized autograft  3. Lumbar 4 through S1 pedicle screw and tyson instrumentation for augmentation of fusion purposes. 4.  Real time intraoperative fluoroscopy and C-arm with the image guidance. 5.  Real time intraoperative motor-evoked potentials, SSEP and nerve monitoring. CPT CODES: 48944, 97390Y1, [id number], 84413L4, 75907, 87101P0, [address] [facility] 58, [id number]    SURGEON:  ZEB Arriola [place]    ANESTHESIA: General endotracheal anesthesia with TIVA and local anesthetic. ESTIMATED BLOOD LOSS: 50 cc    INTRAVENOUS FLUIDS AND URINE OUTPUT: Per anesthesia sheet    TRANSFUSION: None    IMPLANTS: [id number]. 5x35x4, 7.5x35x2, 9x31, 7x31, 2 60mm rods    DRAIN: HV    SPECIMEN: None    COMPLICATIONS: none    PROCEDURE:    After informed consent was obtained, the patient was brought to the operating room. After the uneventful induction of general endotracheal anesthesia and placement of the Anderson catheter, electrodes and monitoring devices were placed. The patient was then positioned on the operating room table, an open-frame Diaz Table, in the prone position. The arms were supported and the neck physiologically extended. All pressure points were adequately padded. The patient's eyes were protected from exposure to prolonged pressure. Baseline electrophysiological potentials were obtained.  Subsequently, we utilized lateral and AP fluoroscopic guidance to identify our incision site relative to the lumbar bony anatomy corresponding to the correct level(s). It was marked out on the skin. The patient was prepped and draped sterilely. The C-arm was also draped sterilely into the field. Time-Out was done in the proper fashion. Perioperative antibiosis was given within one hour of incision. After the \"Time-Out\", we infiltrated the incision with our usual local anesthetic. We incised utilizing a 10-blade scalpel. The subcutaneous tissues were opened with Bovie cautery down to the fascia. The fascia was opened sharply using both sharp and blunt dissection. The paraspinal muscles were dissected off the lateral aspect of the laminae in a subperiosteal fashion, and then a self-retaining retractor was placed. Localization was confirmed with fluoroscopy. We exposed the bilateral partes interarticulares of the correct lumbar levels, Lumbar 4  through S1, and identified the entry sites for our pedicle screws, utilizing the bicortical trajectory. Starting points were created with a high-speed marko bilaterally after fluoroscopic verification. Next, a monitored Jamshidi needle was used to cannulate the pedicles in the inside-out, bicortical, fashion. We verified electrophysiological readings at good thresholds. The trajectories were fluoroscopically confirmed and we interrogated the  holes for integrity with a pedicle probe. Subsequently, the screw shanks were placed over guidewires utilizing fluoroscopic guidance. Finally, we tested and stimulated all screw shanks electrophysiologically at appropriate thresholds. After the pedicle screw shanks were properly placed, we removed the spinous processes of L4  and, partially, of L5, using Leksell rongeurs. A high-speed marko was then utilized to complete the generous laminectomy and the 4 pars was then transected perpendicularly. This detached the L4 articulating facet en-bloc.  We then drilled down the corresponding L5 articulating surface to expose the L4-5 intervertebral space and disc. The rest of the bony decompression was accomplished utilizing Kerrison rongeurs. After the soft tissues, including the ligamentum flavum, had been resected, we visualized the ipsilaterally exiting L4 and the en-passage L5 nerve roots verifying adequate decompression. The disc space was then re-identified and some epidural veins around the disc space were coagulated and cut sharply with the microscissors. The disc space was incised and a radical discectomy was performed using a series of Radha and [employer], extending across the midline with angled curettes. The cartilaginous endplates were then removed using a series of curettes, rongeurs, rasps, and alex, removing the cartilaginous endplates and roughing up the bony endplates to get good bleeding bone along both surfaces for fusion purposes. This was inspected with direct visualization and fluoroscopy repeatedly. The wound was copiously irrigated. The nerves were maintained safe and protected with continued direct visualization. No additional disc or cartilaginous endplate was encountered and very good bleeding bony endplates were seen. We trialed for the properly-sized intervertebral cage and then filled it with allograft. About 9 cc of Osteocel Plus with some of the drilled autologous bone were impacted along the ventral aspect of the disc space and also to the right side of the disc space utilizing a funnel applicator and graft delivery system. While gently and carefully retracting the en-passage nerve root medially, the cage was impacted into position and 50 % expanded to proper alignment. Approximately, 10 degrees of segmental lordosis were gained. Fluoroscopy confirmed good positioning of the cage.  We irrigated the surgical site copiously and hemostasis was obtained with bipolar electrocautery and Flowseal.     We then performed the same sequence of events between L5 LS1 with a decompression, complete right facetectomy, L5-S1 complete discectomy and intervertebral arthrodesis, utilizing an expandable cage with allograft and locally-harvested morselized autograft. At this point in the procedure we proceeded to finish the instrumentation. In cases where a segmental spondylolisthesis was present, we asked our anesthesia colleagues now to provide pharmacological paralysis so that the listhesis would be easier to reduce. We connected the tulip heads with the extenders to all the corresponding screw shanks from L5 to S1, testing for fit and a solid connection. Utilizing calipers within the screw extenders, we measured for the properly-sized lordotic rods and placed them appropriately. These were placed through the guide channels of the extenders and easily placed into the screw heads. Locking nuts were provisionally placed. Then, using the torque  and the counter-torque device, each end of the tyson was secured into the tulip heads of the screws, compressing or reducing the construct lightly before tightening the rods. The locking nuts were again revisited with the torque  to confirm tightness. The screw extenders were then removed and final fluoroscopic images documented satisfactory position of the cage, screws, and rods. The wound was copiously irrigated and small bleeding points were controlled with a bipolar electrocautery. We then decorticated the contralateral facet and posterolateral elements generously extending from L5 to S1 with the high-speed marko. The rest of our harvested autograft which had been denuded off all soft tissue and morselized was then packed into the posterolateral space and gutter extending from L5 through S1 for posterolateral arthrodesis purposes. A medium hemovac drain was placed in the subfascial space and brought out through a separate stab incision in the skin.  The construct was again inspected and found to be quite satisfactory under direct visualization. Still, more irrigation was used and the lumbodorsal fascia was closed using a series of interrupted 0 Vicryl sutures. The subcutaneous tissues were copiously irrigated and closed with an interrupted inverted 3-0 Vicryl suture. The skin was closed with 4-0 Vicryl and Dermabond. The drain was secured with a Bio-patch and covered with a Tegaderm. There were no complications. All counts were reported correct. The nerve stimulation of the screws achieved satisfactory thresholds including final placement of the instrumentation. The patient was returned to the supine position, extubated in the operating room, and taken to the recovery room in satisfactory condition, having tolerated the procedure well and moving all fours strongly to command. Please fax a copy of this report to my office at [phone] and the Primary Care Provider of this patient.

## 2022-02-17 LAB
GLUCOSE BLDC GLUCOMTR-MCNC: 201 MG/DL (ref 70–99)
GLUCOSE BLDC GLUCOMTR-MCNC: 206 MG/DL (ref 70–99)
GLUCOSE BLDC GLUCOMTR-MCNC: 265 MG/DL (ref 70–99)
GLUCOSE BLDC GLUCOMTR-MCNC: 357 MG/DL (ref 70–99)

## 2022-02-17 PROCEDURE — 99233 SBSQ HOSP IP/OBS HIGH 50: CPT | Performed by: HOSPITALIST

## 2022-02-17 RX ORDER — CEFAZOLIN SODIUM/WATER 2 G/20 ML
2 SYRINGE (ML) INTRAVENOUS EVERY 12 HOURS
Status: DISCONTINUED | OUTPATIENT
Start: 2022-02-17 | End: 2022-02-18

## 2022-02-17 NOTE — CM/SW NOTE
Department  notified of request for [facility] and [facility]HC, aidin referrals started. Assigned CM/SW to follow up with pt/family on further discharge planning.        Pending documents, per Sheril Severin   February 17, 2022   15:33

## 2022-02-17 NOTE — PROGRESS NOTES
[facility] Pharmacy Note:  Renal Adjustment for cefazolin (ANCEF)    Isaiah Espinal III is a 66year old patient who has been prescribed cefazolin (ANCEF) 2000 mg every 8 hrs. The estimated creatinine clearance is 29 mL/min (A) (based on SCr of 2.15 mg/dL (H)). The dose has been adjusted to cefazolin (ANCEF) 2000 mg every 12 hrs per hospital renal dose adjustment protocol for treatment of surgical prophylaxis, while drain is present . Pharmacy will follow and adjust dose as warranted for additional renal function changes.     Thank you,    Belen Cherry, PharmD  2/17/2022  12:42 PM

## 2022-02-17 NOTE — SPIRITUAL CARE NOTE
Pt sitting up in chair in a well lit room watching TV. Pt is post-op back surgery from yesterday.  introduced herself and theSCT, while offering radical hospitality, which he declined. He did however express his hopes of returning to a life with out pain and being able to do every day things pain free. The  left with a blessing. The pt will probably be DC tomorrow.

## 2022-02-17 NOTE — PLAN OF CARE
Problem: Patient Centered Care  Goal: Patient preferences are identified and integrated in the patient's plan of care  Description: Interventions:  - What would you like us to know as we care for you?   - Provide timely, complete, and accurate information to patient/family  - Incorporate patient and family knowledge, values, beliefs, and cultural backgrounds into the planning and delivery of care  - Encourage patient/family to participate in care and decision-making at the level they choose  - Honor patient and family perspectives and choices  Outcome: Progressing     Problem: Diabetes/Glucose Control  Goal: Glucose maintained within prescribed range  Description: INTERVENTIONS:  - Monitor Blood Glucose as ordered  - Assess for signs and symptoms of hyperglycemia and hypoglycemia  - Administer ordered medications to maintain glucose within target range  - Assess barriers to adequate nutritional intake and initiate nutrition consult as needed  - Instruct patient on self management of diabetes  Outcome: Progressing     Problem: Patient/Family Goals  Goal: Patient/Family Long Term Goal  Description: Patient's Long Term Goal:     Interventions:  - See additional Care Plan goals for specific interventions  Outcome: Progressing  Goal: Patient/Family Short Term Goal  Description: Patient's Short Term Goal:    Interventions:   - See additional Care Plan goals for specific interventions  Outcome: Progressing     Problem: PAIN - ADULT  Goal: Verbalizes/displays adequate comfort level or patient's stated pain goal  Description: INTERVENTIONS:  - Encourage pt to monitor pain and request assistance  - Assess pain using appropriate pain scale  - Administer analgesics based on type and severity of pain and evaluate response  - Implement non-pharmacological measures as appropriate and evaluate response  - Consider cultural and social influences on pain and pain management  - Manage/alleviate anxiety  - Utilize distraction and/or relaxation techniques  - Monitor for opioid side effects  - Notify MD/LIP if interventions unsuccessful or patient reports new pain  - Anticipate increased pain with activity and pre-medicate as appropriate  Outcome: Progressing     Problem: RISK FOR INFECTION - ADULT  Goal: Absence of fever/infection during anticipated neutropenic period  Description: INTERVENTIONS  - Monitor WBC  - Administer growth factors as ordered  - Implement neutropenic guidelines  Outcome: Progressing     Problem: DISCHARGE PLANNING  Goal: Discharge to home or other facility with appropriate resources  Description: INTERVENTIONS:  - Identify barriers to discharge w/pt and caregiver  - Include patient/family/discharge partner in discharge planning  - Arrange for needed discharge resources and transportation as appropriate  - Identify discharge learning needs (meds, wound care, etc)  - Arrange for interpreters to assist at discharge as needed  - Consider post-discharge preferences of patient/family/discharge partner  - Complete POLST form as appropriate  - Assess patient's ability to be responsible for managing their own health  - Refer to Case Management Department for coordinating discharge planning if the patient needs post-hospital services based on physician/LIP order or complex needs related to functional status, cognitive ability or social support system  Outcome: Alisha Manzanares rested well during the night, pain well controlled. Transfers are with walker and two assists. Mobility needs to improve. Discharge will be to home where a friend will help him in his recovery.

## 2022-02-17 NOTE — PLAN OF CARE
Up with 2 max assist (HX of stroke) walker stand pivot 2-3 steps to chair. Pain managed with oral pain meds. Requested abdominal binder as at home to be applied for comfort. SCDs. Teds. Pacemaker. Left inner foot wound with mepilex overtop. HVAC in place. SL x2.     1805- No void, no BM yet. Adamantly refused straight cath for now; Dr Ivon Koehler paged to notify. Requests additional time. 1930-Encouraged patient to attempt to void again soon and encouraged PO intake; Endorsed to oncoming RN. Problem: Patient Centered Care  Goal: Patient preferences are identified and integrated in the patient's plan of care  Description: Interventions:  - What would you like us to know as we care for you? Will likely need to go to rehab.  **  - Provide timely, complete, and accurate information to patient/family  - Incorporate patient and family knowledge, values, beliefs, and cultural backgrounds into the planning and delivery of care  - Encourage patient/family to participate in care and decision-making at the level they choose  - Honor patient and family perspectives and choices  Outcome: Progressing     Problem: Diabetes/Glucose Control  Goal: Glucose maintained within prescribed range  Description: INTERVENTIONS:  - Monitor Blood Glucose as ordered  - Assess for signs and symptoms of hyperglycemia and hypoglycemia  - Administer ordered medications to maintain glucose within target range  - Assess barriers to adequate nutritional intake and initiate nutrition consult as needed  - Instruct patient on self management of diabetes  Outcome: Progressing     Problem: Patient/Family Goals  Goal: Patient/Family Long Term Goal  Description: Patient's Long Term Goal: *Return home**    Interventions:  - SW consulted for DC planning, working with therapy  - See additional Care Plan goals for specific interventions  Outcome: Progressing  Goal: Patient/Family Short Term Goal  Description: Patient's Short Term Goal: *Pain <5/10**    Interventions:   - Medicated per MD orders for pain.    - See additional Care Plan goals for specific interventions  Outcome: Progressing     Problem: PAIN - ADULT  Goal: Verbalizes/displays adequate comfort level or patient's stated pain goal  Description: INTERVENTIONS:  - Encourage pt to monitor pain and request assistance  - Assess pain using appropriate pain scale  - Administer analgesics based on type and severity of pain and evaluate response  - Implement non-pharmacological measures as appropriate and evaluate response  - Consider cultural and social influences on pain and pain management  - Manage/alleviate anxiety  - Utilize distraction and/or relaxation techniques  - Monitor for opioid side effects  - Notify MD/LIP if interventions unsuccessful or patient reports new pain  - Anticipate increased pain with activity and pre-medicate as appropriate  Outcome: Progressing     Problem: RISK FOR INFECTION - ADULT  Goal: Absence of fever/infection during anticipated neutropenic period  Description: INTERVENTIONS  - Monitor WBC  - Administer growth factors as ordered  - Implement neutropenic guidelines  Outcome: Progressing     Problem: DISCHARGE PLANNING  Goal: Discharge to home or other facility with appropriate resources  Description: INTERVENTIONS:  - Identify barriers to discharge w/pt and caregiver  - Include patient/family/discharge partner in discharge planning  - Arrange for needed discharge resources and transportation as appropriate  - Identify discharge learning needs (meds, wound care, etc)  - Arrange for interpreters to assist at discharge as needed  - Consider post-discharge preferences of patient/family/discharge partner  - Complete POLST form as appropriate  - Assess patient's ability to be responsible for managing their own health  - Refer to Case Management Department for coordinating discharge planning if the patient needs post-hospital services based on physician/LIP order or complex needs related to functional status, cognitive ability or social support system  Outcome: Progressing

## 2022-02-18 LAB
GLUCOSE BLDC GLUCOMTR-MCNC: 157 MG/DL (ref 70–99)
GLUCOSE BLDC GLUCOMTR-MCNC: 164 MG/DL (ref 70–99)
GLUCOSE BLDC GLUCOMTR-MCNC: 266 MG/DL (ref 70–99)
GLUCOSE BLDC GLUCOMTR-MCNC: 271 MG/DL (ref 70–99)

## 2022-02-18 PROCEDURE — 99233 SBSQ HOSP IP/OBS HIGH 50: CPT | Performed by: HOSPITALIST

## 2022-02-18 RX ORDER — POLYETHYLENE GLYCOL 3350 17 G/17G
17 POWDER, FOR SOLUTION ORAL DAILY PRN
Status: DISCONTINUED | OUTPATIENT
Start: 2022-02-18 | End: 2022-02-22

## 2022-02-18 RX ORDER — DOCUSATE SODIUM 100 MG/1
100 CAPSULE, LIQUID FILLED ORAL 2 TIMES DAILY
Status: DISCONTINUED | OUTPATIENT
Start: 2022-02-18 | End: 2022-02-22

## 2022-02-18 RX ORDER — TAMSULOSIN HYDROCHLORIDE 0.4 MG/1
0.8 CAPSULE ORAL DAILY
Status: DISCONTINUED | OUTPATIENT
Start: 2022-02-19 | End: 2022-02-22

## 2022-02-18 RX ORDER — SODIUM PHOSPHATE, DIBASIC AND SODIUM PHOSPHATE, MONOBASIC 7; 19 G/133ML; G/133ML
1 ENEMA RECTAL ONCE AS NEEDED
Status: COMPLETED | OUTPATIENT
Start: 2022-02-18 | End: 2022-02-20

## 2022-02-18 RX ORDER — TAMSULOSIN HYDROCHLORIDE 0.4 MG/1
0.4 CAPSULE ORAL ONCE
Status: COMPLETED | OUTPATIENT
Start: 2022-02-18 | End: 2022-02-18

## 2022-02-18 RX ORDER — BISACODYL 10 MG
10 SUPPOSITORY, RECTAL RECTAL
Status: DISCONTINUED | OUTPATIENT
Start: 2022-02-18 | End: 2022-02-22

## 2022-02-18 NOTE — DIETARY NOTE
Nutrition Re-screen    Pt seen by RD d/t LOS. Pt not at nutrition risk. Pt with good appetite and PO intake >75%. No n/v reported per pt/chart review. Wt gain noted, +edema noted. Skin intact. Will follow up per protocol and monitor as needed.      Wt Readings from Last 5 Encounters:  02/13/22 : 88.6 kg (195 lb 5.2 oz)  02/10/22 : 88.6 kg (195 lb 6 oz)  02/01/22 : 82.6 kg (182 lb)  12/10/21 : 81.8 kg (180 lb 6.4 oz)  10/22/21 : 87.1 kg (192 lb)      Percent Meals Eaten (last 3 days)     Date/Time Percent Meals Eaten (%)    02/17/22 0941 100 %    02/17/22 1325 50 %            Stefano 31, RDN, LDN  [phone]

## 2022-02-18 NOTE — CM/SW NOTE
02/18/22 1100   CM/SW Referral Data   Referral Source Physician   Reason for Referral Discharge planning   Informant Patient   Patient Info   Patient's Current Mental Status at Time of Assessment Alert;Oriented   Patient's Home Environment Condo/Apt with elevator   Number of Levels in Home 1   Patient lives with Alone   Patient Status Prior to Admission   Independent with ADLs and Mobility No   Pt. requires assistance with Driving   Services Requested   DON Screen Requested Yes   Choice of Post-Acute Provider   Informed patient of right to choose their preferred provider Yes     PT recommending [facility]. Referral sent in Aidin. List provided    / to remain available for support and/or discharge planning.      Mary Aguirre MBA MSN, RN CTL/  [phone]

## 2022-02-18 NOTE — CM/SW NOTE
BPCI-Advanced Medicare Program Note:  Plan of care reviewed for care coordination and discharge planning. Noted patient falls under  BPCI/Medicare program, with  for spinal fusion. MOHAMUD tool was used to help determine next care setting. Thus, [address] recommendations is for post acute facility pending patient progress. Case Management team is following for care coordination and discharge planning needs.

## 2022-02-18 NOTE — PLAN OF CARE
States he is having double vision, said he noticed it yesterday but did not tell anyone and he still has it today. Unable to void. 639 mls bladder scan, orders received and Flomax given. Pain managed with oral pain meds. Drain removed early AM, ABX dc'd per MD order. SL.     1450-Arousable but too sleepy to work with PT/OT. Straight cathed for 1300 mls clear yellow urine without difficulty. Monitor for void. Repositioned/tilted with pillows off sacrum q2h and prn comfort; , sacrum red and blanchable and sacral mepilex applied. Problem: Patient Centered Care  Goal: Patient preferences are identified and integrated in the patient's plan of care  Description: Interventions:  - What would you like us to know as we care for you? *Do not want cath if can avoid it, \". ..want to try the Flomax first it worked for me before. ...**  - Provide timely, complete, and accurate information to patient/family  - Incorporate patient and family knowledge, values, beliefs, and cultural backgrounds into the planning and delivery of care  - Encourage patient/family to participate in care and decision-making at the level they choose  - Honor patient and family perspectives and choices  Outcome: Progressing     Problem: Diabetes/Glucose Control  Goal: Glucose maintained within prescribed range  Description: INTERVENTIONS:  - Monitor Blood Glucose as ordered  - Assess for signs and symptoms of hyperglycemia and hypoglycemia  - Administer ordered medications to maintain glucose within target range  - Assess barriers to adequate nutritional intake and initiate nutrition consult as needed  - Instruct patient on self management of diabetes  Outcome: Progressing     Problem: Patient/Family Goals  Goal: Patient/Family Long Term Goal  Description: Patient's Long Term Goal: *Return home later**    Interventions:  - *SW and rehab working on [place]**  - See additional Care Plan goals for specific interventions  Outcome: Progressing  Goal: Patient/Family Short Term Goal  Description: Patient's Short Term Goal: *To be able to void today**    Interventions:   - *Flomax given, waiting for results**  - See additional Care Plan goals for specific interventions  Outcome: Progressing     Problem: PAIN - ADULT  Goal: Verbalizes/displays adequate comfort level or patient's stated pain goal  Description: INTERVENTIONS:  - Encourage pt to monitor pain and request assistance  - Assess pain using appropriate pain scale  - Administer analgesics based on type and severity of pain and evaluate response  - Implement non-pharmacological measures as appropriate and evaluate response  - Consider cultural and social influences on pain and pain management  - Manage/alleviate anxiety  - Utilize distraction and/or relaxation techniques  - Monitor for opioid side effects  - Notify MD/LIP if interventions unsuccessful or patient reports new pain  - Anticipate increased pain with activity and pre-medicate as appropriate  Outcome: Progressing     Problem: RISK FOR INFECTION - ADULT  Goal: Absence of fever/infection during anticipated neutropenic period  Description: INTERVENTIONS  - Monitor WBC  - Administer growth factors as ordered  - Implement neutropenic guidelines  Outcome: Progressing     Problem: DISCHARGE PLANNING  Goal: Discharge to home or other facility with appropriate resources  Description: INTERVENTIONS:  - Identify barriers to discharge w/pt and caregiver  - Include patient/family/discharge partner in discharge planning  - Arrange for needed discharge resources and transportation as appropriate  - Identify discharge learning needs (meds, wound care, etc)  - Arrange for interpreters to assist at discharge as needed  - Consider post-discharge preferences of patient/family/discharge partner  - Complete POLST form as appropriate  - Assess patient's ability to be responsible for managing their own health  - Refer to Case Management Department for coordinating discharge planning if the patient needs post-hospital services based on physician/LIP order or complex needs related to functional status, cognitive ability or social support system  Outcome: Progressing

## 2022-02-18 NOTE — PLAN OF CARE
Pt alert and oriented but drowsy this shift. Pain control with scheduled + prn meds. Abdominal binder on for comfort. Hemovac drain remains patent. AC/HS. Pt has very poor mobility and motivation to move. This RN educating pt on importance of continuing to work on his mobility and strength but pt very unwilling to try and wants staff to move his body for him. Pt also check for void this shift. Pt keeps insisting on needing a little more time but unable to urinate. This RN explained the next step would be to straight cath and he firmly denied that option. This RN explained the risks involved with prolonged urinary retention without intervention including but not limited to urinary tract infections which can harm his kidneys as well as damage to his bladder and pt still saying he absolutely does not want to be catheterized. RN will continue to educate pt as bladder scans hover the 400cc threshold for straight cath intervention. 0645: Pt bladder scanned around 0600 and found to have 504cc on scan. Pt educated now for the third time on how protocol would be to straight cath to drain his bladder to prevent any further complications that can come from prolonged urinary retention. Pt stated he understood the risks involved and still did not want to be catheterized and instead wants to TROMSØ it more time. \" Will endorse to day shift RN to discuss plan for treatment with hospitalist during the day.      Problem: Patient Centered Care  Goal: Patient preferences are identified and integrated in the patient's plan of care  Description: Interventions:  - What would you like us to know as we care for you?   - Provide timely, complete, and accurate information to patient/family  - Incorporate patient and family knowledge, values, beliefs, and cultural backgrounds into the planning and delivery of care  - Encourage patient/family to participate in care and decision-making at the level they choose  - Honor patient and family perspectives and choices  Outcome: Progressing     Problem: Diabetes/Glucose Control  Goal: Glucose maintained within prescribed range  Description: INTERVENTIONS:  - Monitor Blood Glucose as ordered  - Assess for signs and symptoms of hyperglycemia and hypoglycemia  - Administer ordered medications to maintain glucose within target range  - Assess barriers to adequate nutritional intake and initiate nutrition consult as needed  - Instruct patient on self management of diabetes  Outcome: Progressing     Problem: PAIN - ADULT  Goal: Verbalizes/displays adequate comfort level or patient's stated pain goal  Description: INTERVENTIONS:  - Encourage pt to monitor pain and request assistance  - Assess pain using appropriate pain scale  - Administer analgesics based on type and severity of pain and evaluate response  - Implement non-pharmacological measures as appropriate and evaluate response  - Consider cultural and social influences on pain and pain management  - Manage/alleviate anxiety  - Utilize distraction and/or relaxation techniques  - Monitor for opioid side effects  - Notify MD/LIP if interventions unsuccessful or patient reports new pain  - Anticipate increased pain with activity and pre-medicate as appropriate  Outcome: Progressing     Problem: RISK FOR INFECTION - ADULT  Goal: Absence of fever/infection during anticipated neutropenic period  Description: INTERVENTIONS  - Monitor WBC  - Administer growth factors as ordered  - Implement neutropenic guidelines  Outcome: Progressing     Problem: DISCHARGE PLANNING  Goal: Discharge to home or other facility with appropriate resources  Description: INTERVENTIONS:  - Identify barriers to discharge w/pt and caregiver  - Include patient/family/discharge partner in discharge planning  - Arrange for needed discharge resources and transportation as appropriate  - Identify discharge learning needs (meds, wound care, etc)  - Arrange for interpreters to assist at discharge as needed  - Consider post-discharge preferences of patient/family/discharge partner  - Complete POLST form as appropriate  - Assess patient's ability to be responsible for managing their own health  - Refer to Case Management Department for coordinating discharge planning if the patient needs post-hospital services based on physician/LIP order or complex needs related to functional status, cognitive ability or social support system  Outcome: Progressing

## 2022-02-19 LAB
ALBUMIN SERPL-MCNC: 2.9 G/DL (ref 3.4–5)
ANION GAP SERPL CALC-SCNC: 7 MMOL/L (ref 0–18)
BASOPHILS # BLD AUTO: 0.02 X10(3) UL (ref 0–0.2)
BASOPHILS NFR BLD AUTO: 0.2 %
BUN BLD-MCNC: 63 MG/DL (ref 7–18)
BUN/CREAT SERPL: 21.6 (ref 10–20)
CALCIUM BLD-MCNC: 8.7 MG/DL (ref 8.5–10.1)
CO2 SERPL-SCNC: 27 MMOL/L (ref 21–32)
CREAT BLD-MCNC: 2.92 MG/DL
DEPRECATED RDW RBC AUTO: 49.3 FL (ref 35.1–46.3)
EOSINOPHIL # BLD AUTO: 0.13 X10(3) UL (ref 0–0.7)
EOSINOPHIL NFR BLD AUTO: 1.2 %
ERYTHROCYTE [DISTWIDTH] IN BLOOD BY AUTOMATED COUNT: 13.5 % (ref 11–15)
GLUCOSE BLD-MCNC: 130 MG/DL (ref 70–99)
GLUCOSE BLDC GLUCOMTR-MCNC: 149 MG/DL (ref 70–99)
GLUCOSE BLDC GLUCOMTR-MCNC: 213 MG/DL (ref 70–99)
GLUCOSE BLDC GLUCOMTR-MCNC: 224 MG/DL (ref 70–99)
GLUCOSE BLDC GLUCOMTR-MCNC: 228 MG/DL (ref 70–99)
HCT VFR BLD AUTO: 38.5 %
HGB BLD-MCNC: 12.3 G/DL
IMM GRANULOCYTES # BLD AUTO: 0.06 X10(3) UL (ref 0–1)
IMM GRANULOCYTES NFR BLD: 0.6 %
LYMPHOCYTES NFR BLD AUTO: 12.6 %
MCH RBC QN AUTO: 31.9 PG (ref 26–34)
MCHC RBC AUTO-ENTMCNC: 31.9 G/DL (ref 31–37)
MCV RBC AUTO: 99.7 FL
MONOCYTES # BLD AUTO: 0.89 X10(3) UL (ref 0.1–1)
MONOCYTES NFR BLD AUTO: 8.3 %
NEUTROPHILS # BLD AUTO: 8.27 X10 (3) UL (ref 1.5–7.7)
NEUTROPHILS # BLD AUTO: 8.27 X10(3) UL (ref 1.5–7.7)
NEUTROPHILS NFR BLD AUTO: 77.1 %
OSMOLALITY SERPL CALC.SUM OF ELEC: 304 MOSM/KG (ref 275–295)
PHOSPHATE SERPL-MCNC: 4.6 MG/DL (ref 2.5–4.9)
PLATELET # BLD AUTO: 141 10(3)UL (ref 150–450)
POTASSIUM SERPL-SCNC: 4.7 MMOL/L (ref 3.5–5.1)
RBC # BLD AUTO: 3.86 X10(6)UL
SODIUM SERPL-SCNC: 137 MMOL/L (ref 136–145)
WBC # BLD AUTO: 10.7 X10(3) UL (ref 4–11)

## 2022-02-19 PROCEDURE — 99233 SBSQ HOSP IP/OBS HIGH 50: CPT | Performed by: HOSPITALIST

## 2022-02-19 RX ORDER — SODIUM CHLORIDE 9 MG/ML
INJECTION, SOLUTION INTRAVENOUS CONTINUOUS
Status: DISCONTINUED | OUTPATIENT
Start: 2022-02-19 | End: 2022-02-22

## 2022-02-20 LAB
ANION GAP SERPL CALC-SCNC: 8 MMOL/L (ref 0–18)
BASOPHILS # BLD AUTO: 0.03 X10(3) UL (ref 0–0.2)
BASOPHILS NFR BLD AUTO: 0.3 %
BUN BLD-MCNC: 56 MG/DL (ref 7–18)
BUN/CREAT SERPL: 23.9 (ref 10–20)
CALCIUM BLD-MCNC: 8.4 MG/DL (ref 8.5–10.1)
CHLORIDE SERPL-SCNC: 107 MMOL/L (ref 98–112)
CO2 SERPL-SCNC: 28 MMOL/L (ref 21–32)
CREAT BLD-MCNC: 2.34 MG/DL
DEPRECATED RDW RBC AUTO: 51.1 FL (ref 35.1–46.3)
EOSINOPHIL # BLD AUTO: 0.18 X10(3) UL (ref 0–0.7)
EOSINOPHIL NFR BLD AUTO: 1.7 %
ERYTHROCYTE [DISTWIDTH] IN BLOOD BY AUTOMATED COUNT: 13.4 % (ref 11–15)
GLUCOSE BLD-MCNC: 156 MG/DL (ref 70–99)
GLUCOSE BLDC GLUCOMTR-MCNC: 134 MG/DL (ref 70–99)
GLUCOSE BLDC GLUCOMTR-MCNC: 160 MG/DL (ref 70–99)
GLUCOSE BLDC GLUCOMTR-MCNC: 165 MG/DL (ref 70–99)
GLUCOSE BLDC GLUCOMTR-MCNC: 174 MG/DL (ref 70–99)
GLUCOSE BLDC GLUCOMTR-MCNC: 175 MG/DL (ref 70–99)
HCT VFR BLD AUTO: 36.2 %
HGB BLD-MCNC: 11.1 G/DL
IMM GRANULOCYTES # BLD AUTO: 0.05 X10(3) UL (ref 0–1)
IMM GRANULOCYTES NFR BLD: 0.5 %
LYMPHOCYTES # BLD AUTO: 0.97 X10(3) UL (ref 1–4)
LYMPHOCYTES NFR BLD AUTO: 9.4 %
MCH RBC QN AUTO: 31.6 PG (ref 26–34)
MCHC RBC AUTO-ENTMCNC: 30.7 G/DL (ref 31–37)
MCV RBC AUTO: 103.1 FL
MONOCYTES # BLD AUTO: 0.83 X10(3) UL (ref 0.1–1)
MONOCYTES NFR BLD AUTO: 8 %
NEUTROPHILS # BLD AUTO: 8.29 X10 (3) UL (ref 1.5–7.7)
NEUTROPHILS # BLD AUTO: 8.29 X10(3) UL (ref 1.5–7.7)
NEUTROPHILS NFR BLD AUTO: 80.1 %
OSMOLALITY SERPL CALC.SUM OF ELEC: 315 MOSM/KG (ref 275–295)
PHOSPHATE SERPL-MCNC: 3.6 MG/DL (ref 2.5–4.9)
PLATELET # BLD AUTO: 128 10(3)UL (ref 150–450)
POTASSIUM SERPL-SCNC: 4.6 MMOL/L (ref 3.5–5.1)
RBC # BLD AUTO: 3.51 X10(6)UL
SODIUM SERPL-SCNC: 143 MMOL/L (ref 136–145)
WBC # BLD AUTO: 10.4 X10(3) UL (ref 4–11)

## 2022-02-20 PROCEDURE — 99233 SBSQ HOSP IP/OBS HIGH 50: CPT | Performed by: HOSPITALIST

## 2022-02-20 RX ORDER — BISACODYL 10 MG
10 SUPPOSITORY, RECTAL RECTAL
Status: DISCONTINUED | OUTPATIENT
Start: 2022-02-20 | End: 2022-02-20

## 2022-02-20 NOTE — CM/SW NOTE
Met with patient to discuss [facility] choice and choice is [facility]. Reserved in [address] Soliz and updates sent. / to remain available for support and/or discharge planning.      Alexis PATTONA MSN, RN CTL/  [id number]

## 2022-02-20 NOTE — PLAN OF CARE
Problem: Patient Centered Care  Goal: Patient preferences are identified and integrated in the patient's plan of care  Description: Interventions:  - What would you like us to know as we care for you?   - Provide timely, complete, and accurate information to patient/family  - Incorporate patient and family knowledge, values, beliefs, and cultural backgrounds into the planning and delivery of care  - Encourage patient/family to participate in care and decision-making at the level they choose  - Honor patient and family perspectives and choices  Outcome: Progressing     Problem: Diabetes/Glucose Control  Goal: Glucose maintained within prescribed range  Description: INTERVENTIONS:  - Monitor Blood Glucose as ordered  - Assess for signs and symptoms of hyperglycemia and hypoglycemia  - Administer ordered medications to maintain glucose within target range  - Assess barriers to adequate nutritional intake and initiate nutrition consult as needed  - Instruct patient on self management of diabetes  Outcome: Progressing     Problem: Patient/Family Goals  Goal: Patient/Family Long Term Goal  Description: Patient's Long Term Goal:     Interventions:  -   - See additional Care Plan goals for specific interventions  Outcome: Progressing  Goal: Patient/Family Short Term Goal  Description: Patient's Short Term Goal:     Interventions:   -   - See additional Care Plan goals for specific interventions  Outcome: Progressing     Problem: PAIN - ADULT  Goal: Verbalizes/displays adequate comfort level or patient's stated pain goal  Description: INTERVENTIONS:  - Encourage pt to monitor pain and request assistance  - Assess pain using appropriate pain scale  - Administer analgesics based on type and severity of pain and evaluate response  - Implement non-pharmacological measures as appropriate and evaluate response  - Consider cultural and social influences on pain and pain management  - Manage/alleviate anxiety  - Utilize distraction and/or relaxation techniques  - Monitor for opioid side effects  - Notify MD/LIP if interventions unsuccessful or patient reports new pain  - Anticipate increased pain with activity and pre-medicate as appropriate  Outcome: Progressing     Problem: RISK FOR INFECTION - ADULT  Goal: Absence of fever/infection during anticipated neutropenic period  Description: INTERVENTIONS  - Monitor WBC  - Administer growth factors as ordered  - Implement neutropenic guidelines  Outcome: Progressing     Problem: DISCHARGE PLANNING  Goal: Discharge to home or other facility with appropriate resources  Description: INTERVENTIONS:  - Identify barriers to discharge w/pt and caregiver  - Include patient/family/discharge partner in discharge planning  - Arrange for needed discharge resources and transportation as appropriate  - Identify discharge learning needs (meds, wound care, etc)  - Arrange for interpreters to assist at discharge as needed  - Consider post-discharge preferences of patient/family/discharge partner  - Complete POLST form as appropriate  - Assess patient's ability to be responsible for managing their own health  - Refer to Case Management Department for coordinating discharge planning if the patient needs post-hospital services based on physician/LIP order or complex needs related to functional status, cognitive ability or social support system  Outcome: Progressing   Pt is a&o x4, drowsy in the morning. On room air. Tele in place. CHUY hose and SCD on to BLE. PRN Tramadol given for pain. Used lift transfer to transfer patient from the chair to bed. Last BM 2/14, Miralex, Milk of Mg, and fleet enema given, no BM yet. Helped pt to order meals. Surgical incision- clean, dry and intact. Left foot blister covered with Mepilex. . Plan to go to [facility] [place] when medically stable. Call light in reach. 1654 pt feels chill and shaky, temp- 98.4, blood glucose 174.  MD notified , MD order to monitor his tempeture.

## 2022-02-20 NOTE — PLAN OF CARE
Problem: Patient Centered Care  Goal: Patient preferences are identified and integrated in the patient's plan of care  Description: Interventions:  - What would you like us to know as we care for you?   - Provide timely, complete, and accurate information to patient/family  - Incorporate patient and family knowledge, values, beliefs, and cultural backgrounds into the planning and delivery of care  - Encourage patient/family to participate in care and decision-making at the level they choose  - Honor patient and family perspectives and choices  Outcome: Progressing     Problem: Diabetes/Glucose Control  Goal: Glucose maintained within prescribed range  Description: INTERVENTIONS:  - Monitor Blood Glucose as ordered  - Assess for signs and symptoms of hyperglycemia and hypoglycemia  - Administer ordered medications to maintain glucose within target range  - Assess barriers to adequate nutritional intake and initiate nutrition consult as needed  - Instruct patient on self management of diabetes  Outcome: Progressing     Problem: PAIN - ADULT  Goal: Verbalizes/displays adequate comfort level or patient's stated pain goal  Description: INTERVENTIONS:  - Encourage pt to monitor pain and request assistance  - Assess pain using appropriate pain scale  - Administer analgesics based on type and severity of pain and evaluate response  - Implement non-pharmacological measures as appropriate and evaluate response  - Consider cultural and social influences on pain and pain management  - Manage/alleviate anxiety  - Utilize distraction and/or relaxation techniques  - Monitor for opioid side effects  - Notify MD/LIP if interventions unsuccessful or patient reports new pain  - Anticipate increased pain with activity and pre-medicate as appropriate  Outcome: Progressing     Problem: RISK FOR INFECTION - ADULT  Goal: Absence of fever/infection during anticipated neutropenic period  Description: INTERVENTIONS  - Monitor WBC  - Administer growth factors as ordered  - Implement neutropenic guidelines  Outcome: Progressing     Problem: DISCHARGE PLANNING  Goal: Discharge to home or other facility with appropriate resources  Description: INTERVENTIONS:  - Identify barriers to discharge w/pt and caregiver  - Include patient/family/discharge partner in discharge planning  - Arrange for needed discharge resources and transportation as appropriate  - Identify discharge learning needs (meds, wound care, etc)  - Arrange for interpreters to assist at discharge as needed  - Consider post-discharge preferences of patient/family/discharge partner  - Complete POLST form as appropriate  - Assess patient's ability to be responsible for managing their own health  - Refer to Case Management Department for coordinating discharge planning if the patient needs post-hospital services based on physician/LIP order or complex needs related to functional status, cognitive ability or social support system  Outcome: Progressing     Pt called for help around 0400. Went in to see what was wrong and he was complaining that he was uncomfortable. Stated he would like to be straightened out, which this nurse did. Pt repositioned. Pt also wanting his tele and lyle cath removed. Per pt he stated \" wake the doctor up I want this m.f. catheter out\" when educating the patient to why it needs to be kept in, he stated \"I will risk getting an infection but this catheter needs to be removed, the patient is always right. \" This nurse also tried to help the patient narrow down his list of rehab places and pt is refusing until the catheter comes out. Pt will talk with his doctor in the morning. Pt alert and oriented. More awake but can be drowsy at times. the lift was used to get the patient from the chair to the bed. Surgical dressing CDI. On 2L O2 throughout night. Remote tele, no calls. ACHS. Tolerating diet. Lyle cath in place. Tylenol given for pain. IVF running.  Plan is for rehab pending patient's choice in facility and acceptance.

## 2022-02-20 NOTE — PLAN OF CARE
Hue Islas is post op day #3 S/P lumbar fusion w bone graft. He is more alert today- back incision SAILAJA w skin glue- dry and intact. He has been drowsy per night shift RN. Medicating w tylenol PRN- held robaxin and Lyrica to prevent drowsiness. Forgetful. Generalized weakness. Max assist to chair- stand and pivot w gait belt . States his perception is off when feeding himself he missed his mouth several times and food went onto bed. As the day went on get got better with his UE weakness- unable to control body sitting at EOB- leans to the side- he reports he was this way before? Anderson maintained. IV fluids started at 75 ml/hr per order due to renal functions. BG AC/HS- sliding scale as ordered. Staff ordering meals for patient. 149 at breakfast, 214- lunch and 224 for dinner, OOB into chair at 5:10pm- will encourage him to sit up few hours.    Problem: Diabetes/Glucose Control  Goal: Glucose maintained within prescribed range  Description: INTERVENTIONS:  - Monitor Blood Glucose as ordered  - Assess for signs and symptoms of hyperglycemia and hypoglycemia  - Administer ordered medications to maintain glucose within target range  - Assess barriers to adequate nutritional intake and initiate nutrition consult as needed  - Instruct patient on self management of diabetes  Outcome: Progressing     Problem: PAIN - ADULT  Goal: Verbalizes/displays adequate comfort level or patient's stated pain goal  Description: INTERVENTIONS:  - Encourage pt to monitor pain and request assistance  - Assess pain using appropriate pain scale  - Administer analgesics based on type and severity of pain and evaluate response  - Implement non-pharmacological measures as appropriate and evaluate response  - Consider cultural and social influences on pain and pain management  - Manage/alleviate anxiety  - Utilize distraction and/or relaxation techniques  - Monitor for opioid side effects  - Notify MD/LIP if interventions unsuccessful or patient reports new pain  - Anticipate increased pain with activity and pre-medicate as appropriate  Outcome: Progressing     Problem: RISK FOR INFECTION - ADULT  Goal: Absence of fever/infection during anticipated neutropenic period  Description: INTERVENTIONS  - Monitor WBC  - Administer growth factors as ordered  - Implement neutropenic guidelines  Outcome: Progressing     Problem: DISCHARGE PLANNING  Goal: Discharge to home or other facility with appropriate resources  Description: INTERVENTIONS:  - Identify barriers to discharge w/pt and caregiver  - Include patient/family/discharge partner in discharge planning  - Arrange for needed discharge resources and transportation as appropriate  - Identify discharge learning needs (meds, wound care, etc)  - Arrange for interpreters to assist at discharge as needed  - Consider post-discharge preferences of patient/family/discharge partner  - Complete POLST form as appropriate  - Assess patient's ability to be responsible for managing their own health  - Refer to Case Management Department for coordinating discharge planning if the patient needs post-hospital services based on physician/LIP order or complex needs related to functional status, cognitive ability or social support system  Outcome: Progressing

## 2022-02-21 LAB
ALBUMIN SERPL-MCNC: 2.4 G/DL (ref 3.4–5)
ANION GAP SERPL CALC-SCNC: 4 MMOL/L (ref 0–18)
BASOPHILS # BLD AUTO: 0.01 X10(3) UL (ref 0–0.2)
BASOPHILS NFR BLD AUTO: 0.1 %
BUN BLD-MCNC: 46 MG/DL (ref 7–18)
BUN/CREAT SERPL: 21.9 (ref 10–20)
CALCIUM BLD-MCNC: 8.3 MG/DL (ref 8.5–10.1)
CHLORIDE SERPL-SCNC: 109 MMOL/L (ref 98–112)
CO2 SERPL-SCNC: 26 MMOL/L (ref 21–32)
CREAT BLD-MCNC: 2.1 MG/DL
DEPRECATED RDW RBC AUTO: 47.6 FL (ref 35.1–46.3)
EOSINOPHIL # BLD AUTO: 0.17 X10(3) UL (ref 0–0.7)
EOSINOPHIL NFR BLD AUTO: 1.7 %
ERYTHROCYTE [DISTWIDTH] IN BLOOD BY AUTOMATED COUNT: 13 % (ref 11–15)
EST. AVERAGE GLUCOSE BLD GHB EST-MCNC: 209 MG/DL (ref 68–126)
GLUCOSE BLD-MCNC: 157 MG/DL (ref 70–99)
GLUCOSE BLDC GLUCOMTR-MCNC: 135 MG/DL (ref 70–99)
GLUCOSE BLDC GLUCOMTR-MCNC: 153 MG/DL (ref 70–99)
GLUCOSE BLDC GLUCOMTR-MCNC: 179 MG/DL (ref 70–99)
GLUCOSE BLDC GLUCOMTR-MCNC: 248 MG/DL (ref 70–99)
HCT VFR BLD AUTO: 32.7 %
HGB BLD-MCNC: 10.3 G/DL
IMM GRANULOCYTES # BLD AUTO: 0.04 X10(3) UL (ref 0–1)
IMM GRANULOCYTES NFR BLD: 0.4 %
LYMPHOCYTES # BLD AUTO: 0.81 X10(3) UL (ref 1–4)
LYMPHOCYTES NFR BLD AUTO: 8.1 %
MAGNESIUM SERPL-MCNC: 2.4 MG/DL (ref 1.6–2.6)
MCH RBC QN AUTO: 31.3 PG (ref 26–34)
MCHC RBC AUTO-ENTMCNC: 31.5 G/DL (ref 31–37)
MCV RBC AUTO: 99.4 FL
MONOCYTES # BLD AUTO: 0.78 X10(3) UL (ref 0.1–1)
MONOCYTES NFR BLD AUTO: 7.8 %
NEUTROPHILS # BLD AUTO: 8.25 X10 (3) UL (ref 1.5–7.7)
NEUTROPHILS # BLD AUTO: 8.25 X10(3) UL (ref 1.5–7.7)
OSMOLALITY SERPL CALC.SUM OF ELEC: 303 MOSM/KG (ref 275–295)
PHOSPHATE SERPL-MCNC: 3.2 MG/DL (ref 2.5–4.9)
PLATELET # BLD AUTO: 147 10(3)UL (ref 150–450)
POTASSIUM SERPL-SCNC: 4.4 MMOL/L (ref 3.5–5.1)
RBC # BLD AUTO: 3.29 X10(6)UL
SODIUM SERPL-SCNC: 139 MMOL/L (ref 136–145)
WBC # BLD AUTO: 10.1 X10(3) UL (ref 4–11)

## 2022-02-21 PROCEDURE — 99233 SBSQ HOSP IP/OBS HIGH 50: CPT | Performed by: HOSPITALIST

## 2022-02-21 RX ORDER — HYDROCODONE BITARTRATE AND ACETAMINOPHEN 5; 325 MG/1; MG/1
1 TABLET ORAL EVERY 4 HOURS PRN
Status: DISCONTINUED | OUTPATIENT
Start: 2022-02-21 | End: 2022-02-21

## 2022-02-21 RX ORDER — ACETAMINOPHEN AND CODEINE PHOSPHATE 300; 30 MG/1; MG/1
1 TABLET ORAL EVERY 4 HOURS PRN
Status: DISCONTINUED | OUTPATIENT
Start: 2022-02-21 | End: 2022-02-22

## 2022-02-21 NOTE — PLAN OF CARE
Problem: Patient Centered Care  Goal: Patient preferences are identified and integrated in the patient's plan of care  Description: Interventions:  - What would you like us to know as we care for you?  \"I just had back surgery\"  - Provide timely, complete, and accurate information to patient/family  - Incorporate patient and family knowledge, values, beliefs, and cultural backgrounds into the planning and delivery of care  - Encourage patient/family to participate in care and decision-making at the level they choose  - Honor patient and family perspectives and choices  Outcome: Progressing     Problem: Diabetes/Glucose Control  Goal: Glucose maintained within prescribed range  Description: INTERVENTIONS:  - Monitor Blood Glucose as ordered  - Assess for signs and symptoms of hyperglycemia and hypoglycemia  - Administer ordered medications to maintain glucose within target range  - Assess barriers to adequate nutritional intake and initiate nutrition consult as needed  - Instruct patient on self management of diabetes  Outcome: Progressing     Problem: Patient/Family Goals  Goal: Patient/Family Long Term Goal  Description: Patient's Long Term Goal: \"to go home\"    Interventions:  - See additional Care Plan goals for specific interventions  Outcome: Progressing  Goal: Patient/Family Short Term Goal  Description: Patient's Short Term Goal: \"no more pain\"    Interventions:   - See additional Care Plan goals for specific interventions  Outcome: Progressing     Problem: PAIN - ADULT  Goal: Verbalizes/displays adequate comfort level or patient's stated pain goal  Description: INTERVENTIONS:  - Encourage pt to monitor pain and request assistance  - Assess pain using appropriate pain scale  - Administer analgesics based on type and severity of pain and evaluate response  - Implement non-pharmacological measures as appropriate and evaluate response  - Consider cultural and social influences on pain and pain management  - Manage/alleviate anxiety  - Utilize distraction and/or relaxation techniques  - Monitor for opioid side effects  - Notify MD/LIP if interventions unsuccessful or patient reports new pain  - Anticipate increased pain with activity and pre-medicate as appropriate  Outcome: Progressing     Problem: RISK FOR INFECTION - ADULT  Goal: Absence of fever/infection during anticipated neutropenic period  Description: INTERVENTIONS  - Monitor WBC  - Administer growth factors as ordered  - Implement neutropenic guidelines  Outcome: Progressing     Problem: DISCHARGE PLANNING  Goal: Discharge to home or other facility with appropriate resources  Description: INTERVENTIONS:  - Identify barriers to discharge w/pt and caregiver  - Include patient/family/discharge partner in discharge planning  - Arrange for needed discharge resources and transportation as appropriate  - Identify discharge learning needs (meds, wound care, etc)  - Arrange for interpreters to assist at discharge as needed  - Consider post-discharge preferences of patient/family/discharge partner  - Complete POLST form as appropriate  - Assess patient's ability to be responsible for managing their own health  - Refer to Case Management Department for coordinating discharge planning if the patient needs post-hospital services based on physician/LIP order or complex needs related to functional status, cognitive ability or social support system  Outcome: Progressing     Patient declining bowel regimen, please see previous note. Mild temp, MD aware, to monitor. VSS. Will continue to monitor. [employer] on [employer].

## 2022-02-21 NOTE — SPIRITUAL CARE NOTE
PT sitting up in bed watching TV in a well lit room. Pt is post-op back surgery and is to be discharged soon.  reintroduced herself, while offering radical hospitality, which he declined. Pt expressed his desires in being DC, as he's been\" here too long. \" aCmilla Brown left pt with a blessing.

## 2022-02-21 NOTE — PROGRESS NOTES
Patient has not had a BM. Informed MD, ordered to give suppository. Patient states that \"I already had a BM when they gave me that enema\". Spoke with staff and looked at documentation, patient did not have a BM. Patient declining suppository. No eye contact, looking straight ahead. I explained to him the risks of not having a bowel movement for days. Patient understands risks, but still declining suppository as well as other bowel regimen medications: \"I will let you know if I need help\". I asked him if there are any concerns about receiving it and he says, \"Nope, I don't want anything right now\". MD aware.

## 2022-02-22 VITALS
SYSTOLIC BLOOD PRESSURE: 99 MMHG | TEMPERATURE: 98 F | BODY MASS INDEX: 31 KG/M2 | HEART RATE: 80 BPM | DIASTOLIC BLOOD PRESSURE: 63 MMHG | OXYGEN SATURATION: 94 % | WEIGHT: 195.31 LBS | RESPIRATION RATE: 16 BRPM

## 2022-02-22 LAB
GLUCOSE BLDC GLUCOMTR-MCNC: 79 MG/DL (ref 70–99)
SARS-COV-2 RNA RESP QL NAA+PROBE: NOT DETECTED

## 2022-02-22 PROCEDURE — 99239 HOSP IP/OBS DSCHRG MGMT >30: CPT | Performed by: HOSPITALIST

## 2022-02-22 RX ORDER — PANTOPRAZOLE SODIUM 40 MG/1
40 TABLET, DELAYED RELEASE ORAL
Qty: 30 TABLET | Refills: 0 | Status: SHIPPED | OUTPATIENT
Start: 2022-02-23

## 2022-02-22 RX ORDER — TAMSULOSIN HYDROCHLORIDE 0.4 MG/1
0.8 CAPSULE ORAL DAILY
Qty: 30 CAPSULE | Refills: 0 | Status: SHIPPED | OUTPATIENT
Start: 2022-02-23 | End: 2022-03-25

## 2022-02-22 RX ORDER — METHOCARBAMOL 500 MG/1
1000 TABLET, FILM COATED ORAL 2 TIMES DAILY
Refills: 0 | Status: SHIPPED | COMMUNITY
Start: 2022-02-22

## 2022-02-22 RX ORDER — TRAMADOL HYDROCHLORIDE 50 MG/1
50 TABLET ORAL EVERY 6 HOURS PRN
Refills: 0 | Status: SHIPPED | COMMUNITY
Start: 2022-02-22

## 2022-02-22 NOTE — PLAN OF CARE
Pt A&O x4. CMS intact, generalized weakness overall. Pt on RA, encouraged to use IS. Remote tele in place, no calls. SCD's and tedhose in place for DVT prophylaxis. Pt tolerating carb controlled diet, accuchecks ACHS. Pt voiding via lyle, no BM during the night. Pain managed with scheduled OxyContin. Up max assist with total lift. Back dressing open to air, sacral aquacel in place to prevent redness. Call light within reach, bed in lowest position. Problem: Patient Centered Care  Goal: Patient preferences are identified and integrated in the patient's plan of care  Description: Interventions:  - What would you like us to know as we care for you? I have been in a wheelchair for 6 months.    - Provide timely, complete, and accurate information to patient/family  - Incorporate patient and family knowledge, values, beliefs, and cultural backgrounds into the planning and delivery of care  - Encourage patient/family to participate in care and decision-making at the level they choose  - Honor patient and family perspectives and choices  Outcome: Progressing     Problem: Diabetes/Glucose Control  Goal: Glucose maintained within prescribed range  Description: INTERVENTIONS:  - Monitor Blood Glucose as ordered  - Assess for signs and symptoms of hyperglycemia and hypoglycemia  - Administer ordered medications to maintain glucose within target range  - Assess barriers to adequate nutritional intake and initiate nutrition consult as needed  - Instruct patient on self management of diabetes  Outcome: Progressing     Problem: Patient/Family Goals  Goal: Patient/Family Long Term Goal  Description: Patient's Long Term Goal: Return home    Interventions:  - Work with PT/OT   - Pain management   - Increase strength   - See additional Care Plan goals for specific interventions  Outcome: Progressing  Goal: Patient/Family Short Term Goal  Description: Patient's Short Term Goal: Pain control     Interventions:   - Pain medications   - Ice therapy  - See additional Care Plan goals for specific interventions  Outcome: Progressing     Problem: PAIN - ADULT  Goal: Verbalizes/displays adequate comfort level or patient's stated pain goal  Description: INTERVENTIONS:  - Encourage pt to monitor pain and request assistance  - Assess pain using appropriate pain scale  - Administer analgesics based on type and severity of pain and evaluate response  - Implement non-pharmacological measures as appropriate and evaluate response  - Consider cultural and social influences on pain and pain management  - Manage/alleviate anxiety  - Utilize distraction and/or relaxation techniques  - Monitor for opioid side effects  - Notify MD/LIP if interventions unsuccessful or patient reports new pain  - Anticipate increased pain with activity and pre-medicate as appropriate  Outcome: Progressing     Problem: RISK FOR INFECTION - ADULT  Goal: Absence of fever/infection during anticipated neutropenic period  Description: INTERVENTIONS  - Monitor WBC  - Administer growth factors as ordered  - Implement neutropenic guidelines  Outcome: Progressing     Problem: DISCHARGE PLANNING  Goal: Discharge to home or other facility with appropriate resources  Description: INTERVENTIONS:  - Identify barriers to discharge w/pt and caregiver  - Include patient/family/discharge partner in discharge planning  - Arrange for needed discharge resources and transportation as appropriate  - Identify discharge learning needs (meds, wound care, etc)  - Arrange for interpreters to assist at discharge as needed  - Consider post-discharge preferences of patient/family/discharge partner  - Complete POLST form as appropriate  - Assess patient's ability to be responsible for managing their own health  - Refer to Case Management Department for coordinating discharge planning if the patient needs post-hospital services based on physician/LIP order or complex needs related to functional status, cognitive ability or social support system  Outcome: Progressing

## 2022-02-22 NOTE — CM/SW NOTE
Patient chart reviewed for discharge: Medication Reconciliation completed, Specialist/PCP follow up listed, and disease specific Instructions/Education included in After Visit Summary. Discharge RN notified patient's RN of AVS completion and verified all consultants have signed off. Patient's RN to notify DC RN if discharge status changes. Derick Colón.  Joshua Stevens RN, BSN  Nurse   [phone]

## 2022-02-22 NOTE — PLAN OF CARE
Problem: PAIN - ADULT  Goal: Verbalizes/displays adequate comfort level or patient's stated pain goal  Description: INTERVENTIONS:  - Encourage pt to monitor pain and request assistance  - Assess pain using appropriate pain scale  - Administer analgesics based on type and severity of pain and evaluate response  - Implement non-pharmacological measures as appropriate and evaluate response  - Consider cultural and social influences on pain and pain management  - Manage/alleviate anxiety  - Utilize distraction and/or relaxation techniques  - Monitor for opioid side effects  - Notify MD/LIP if interventions unsuccessful or patient reports new pain  - Anticipate increased pain with activity and pre-medicate as appropriate  Outcome: Adequate for Discharge     Problem: RISK FOR INFECTION - ADULT  Goal: Absence of fever/infection during anticipated neutropenic period  Description: INTERVENTIONS  - Monitor WBC  - Administer growth factors as ordered  - Implement neutropenic guidelines  Outcome: Adequate for Discharge     Problem: DISCHARGE PLANNING  Goal: Discharge to home or other facility with appropriate resources  Description: INTERVENTIONS:  - Identify barriers to discharge w/pt and caregiver  - Include patient/family/discharge partner in discharge planning  - Arrange for needed discharge resources and transportation as appropriate  - Identify discharge learning needs (meds, wound care, etc)  - Arrange for interpreters to assist at discharge as needed  - Consider post-discharge preferences of patient/family/discharge partner  - Complete POLST form as appropriate  - Assess patient's ability to be responsible for managing their own health  - Refer to Case Management Department for coordinating discharge planning if the patient needs post-hospital services based on physician/LIP order or complex needs related to functional status, cognitive ability or social support system  Outcome: Adequate for Discharge  Incision to lower back remains SAILAJA and well approximated. Patient's pain is well controlled with scheduled oxy and robaxin. Lift used to transfer patient to the chair and back. Bilateral lower extremities very sensitive to touch. Patient denies numbness or tingling. Anderson cath in place. Plan: rehab at [facility] today at [address] via ambulance.

## 2022-02-22 NOTE — CM/SW NOTE
JACOB followed up on DC planning. SW confirmed with RN that pt is medically ready for discharge today. SW called and spoke with Janette King to arrange a time for discharge. RN is aware of discharge time and location and will inform patient. RN to attach IP Transfer Report to After Visit Summary packet for transfer to [facility]. SW left VM for DCSS at [id number] to inform of discharge location. Pt requires an ambulance according to the RN due to being a max assist lift for transfers. JACOB called and ordered an Ambulance from [facility] to transport pt to [facility] at 1:00 PM.  PCS flow sheet completed by BRYAN Liu. RN to attached to AVS and print out. SW/CM to remain available for support and/or discharge planning. PLAN: DC to [facility] at 1:00 PM     RN to call report to [address] [place] [address], SUNNI, MSW ext.  [phone]

## 2022-04-22 ENCOUNTER — TELEPHONE (OUTPATIENT)
Dept: CARDIOLOGY | Age: 72
End: 2022-04-22

## 2023-02-06 NOTE — PLAN OF CARE
Patient alert, reports pain to hip and back with movement that is partially relieved by tramadol. Patient up to chair and bathroom with walker and assistance, reminded to call for assistance, chair alarm set up. MRI pending [address] for pacemaker. Peripheral Block    Patient location during procedure: pre-op  Reason for block: post-op pain management and at surgeon's request  Start time: 2/6/2023 9:48 AM  End time: 2/6/2023 9:50 AM  Staffing  Performed: anesthesiologist   Anesthesiologist: Starla Hadley DO  Preanesthetic Checklist  Completed: patient identified, IV checked, site marked, risks and benefits discussed, surgical/procedural consents, equipment checked, pre-op evaluation, timeout performed, anesthesia consent given, oxygen available and monitors applied/VS acknowledged  Peripheral Block   Patient position: supine  Prep: alcohol swabs  Provider prep: mask and sterile gloves  Patient monitoring: cardiac monitor, continuous pulse ox, frequent blood pressure checks and IV access  Block type: Saphenous  Laterality: right  Injection technique: single-shot  Guidance: ultrasound guided  Local infiltration: ropivacaine  Local infiltration: ropivacaine    Needle   Needle type: combined needle/nerve stimulator   Needle gauge: 22 G  Needle localization: ultrasound guidance  Needle length: 5 cm  Assessment   Injection assessment: negative aspiration for heme, no paresthesia on injection and local visualized surrounding nerve on ultrasound  Paresthesia pain: none  Slow fractionated injection: yes  Hemodynamics: stable  Real-time US image taken/store: yes  Outcomes: uncomplicated and patient tolerated procedure well    Additional Notes  Timeout performed  Medications Administered  ropivacaine (NAROPIN) injection 0.5% - Perineural   15 mL - 2/6/2023 9:48:00 AM  dexamethasone 4 MG/ML - Perineural   2 mg - 2/6/2023 9:48:00 AM ADULT  Goal: Verbalizes/displays adequate comfort level or patient's stated pain goal  Description: INTERVENTIONS:  - Encourage pt to monitor pain and request assistance  - Assess pain using appropriate pain scale  - Administer analgesics based on type and

## 2023-04-26 NOTE — PROGRESS NOTES
[facility]  Progress Note     Quincy Serve III Patient Status:  Inpatient    3/21/1950 MRN [id number]   [facility] 3NE-A Attending Rea [facility] Day # 2 PCP Shimon Freitas DO     Chief Complaint: SOB    S: Glorious Farrell The left coronary artery was selectively engaged and injected. Multiple views of the injected vessel were taken. PLAN:     3 75years old man with history of saddle PE status post partial intrapulmonary lysis in March on Eliquis and compliant with medicines coming again with shortness of breath and fatigue-CT chest done in the emergency room negative for PE.  Pt to g

## 2025-07-10 NOTE — HOME CARE LIAISON
Lab Results   Component Value Date    HGBA1C 6.6 (H) 06/18/2025       Recent Labs     07/09/25  1150 07/09/25  1629 07/09/25  2045 07/10/25  0731   POCGLU 204* 182* 257* 101       Blood Sugar Average: Last 72 hrs:  (P) [id number]: Glipizide 10mg BID, Linagliptin 5mg daily (listed as not taking), Metformin 500mg at dinner   Here: Lantus 12 units QHS, Lispro 4 units TID with meals, CDI/Accuchecks.  Will need to transition to or optimize home meds, or else patient will need to diabetes kit/education.  Management as per IM.      Patient provided with list of [place] [address] providers from [address] [place] [address], patient choice is [address]. Explained to patient he will have to establish with a PCP before home health visits can begin. Patient verbalized understanding.    Agency reserved in [address] [place] [address] a

## (undated) DIAGNOSIS — Z02.9 ENCOUNTERS FOR UNSPECIFIED ADMINISTRATIVE PURPOSE: ICD-10-CM

## (undated) DEVICE — FLOSEAL HEMOSTATIC MATRIX, 5ML: Brand: FLOSEAL HEMOSTATIC MATRIX

## (undated) DEVICE — PEN: MARKING STD PT 100/CS: Brand: MEDICAL ACTION INDUSTRIES

## (undated) DEVICE — 9534HP TRANSPARENT DRSG W/FRAME: Brand: 3M™ TEGADERM™

## (undated) DEVICE — ENCORE® LATEX MICRO SIZE 8, STERILE LATEX POWDER-FREE SURGICAL GLOVE: Brand: ENCORE

## (undated) DEVICE — SOL  .9 1000ML BAG

## (undated) DEVICE — SUTURE MONOCRYL 3-0 Y497G

## (undated) DEVICE — BIPOLAR SEALER 23-112-1 AQM 6.0: Brand: AQUAMANTYS™

## (undated) DEVICE — SUTURE VICRYL 3-0 J761D

## (undated) DEVICE — EXOFIN TISSUE ADHESIVE 1.0ML

## (undated) DEVICE — C-ARMOR C-ARM EQUIPMENT COVERS CLEAR STERILE UNIVERSAL FIT 12 PER CASE: Brand: C-ARMOR

## (undated) DEVICE — DRAPE SRG 90X60IN BCK TBL CVR

## (undated) DEVICE — CHLORAPREP 26ML APPLICATOR

## (undated) DEVICE — SOL  .9 1000ML BTL

## (undated) DEVICE — SUTURE ABS UNI STRATA #0 60CM

## (undated) DEVICE — Device

## (undated) DEVICE — GOWN SURG AERO BLUE PERF XLG

## (undated) DEVICE — GAMMEX® PI HYBRID SIZE 8, STERILE POWDER-FREE SURGICAL GLOVE, POLYISOPRENE AND NEOPRENE BLEND: Brand: GAMMEX

## (undated) DEVICE — NV I-PAS III BEVELED TIP STER

## (undated) DEVICE — DISPOSABLE SLIM BIPOLAR FORCEPS, NON-STICK,: Brand: SPETZLER-MALIS

## (undated) DEVICE — KIT ACCESS MAXCESS 4

## (undated) DEVICE — NVM5 PROBE SNGL USE STER PKG

## (undated) DEVICE — NV CLIP DSC&IN-LINE ACTIVATOR

## (undated) DEVICE — KIT DRN 1/8IN PVC 3 SPRG EVAC

## (undated) DEVICE — DRAPE SHEET LG

## (undated) DEVICE — DRESSING BIOPATCH 1X4 CNTR

## (undated) DEVICE — WRAP COOLING BACK W/NO PILLOW

## (undated) DEVICE — PRECISION MATCH HEAD

## (undated) DEVICE — 3M™ TEGADERM™ TRANSPARENT FILM DRESSING, 1626W, 4 IN X 4-3/4 IN (10 CM X 12 CM), 50 EACH/CARTON, 4 CARTON/CASE: Brand: 3M™ TEGADERM™

## (undated) DEVICE — LAMINECTOMY: Brand: MEDLINE INDUSTRIES, INC.

## (undated) DEVICE — WIRE FX PRCPT KRSH BVL BLNT

## (undated) NOTE — IP AVS SNAPSHOT
[facility]            (For Outpatient Use Only) Initial Admit Date: 2022   Inpt/Obs Admit Date: Inpt: N/A / Obs: 22   Discharge Date:    Lanre Diop:  [de-identified]   MRN: [de-identified]   CSN: [id number]   CEID: [id number]        ENCOUNTER  Patient Class: Observation Admitting Provider: Anne Ordaz MD Unit: [address] Service: Medical Attending Provider: Janie Garay MD   Bed: 453-A   Visit Type:   Referring Physician: No ref. provider found Billing Flag:    Admit Diagnosis: Unsteady gait when walking [R26.81]      PATIENT  Legal Name:   Aurelia Schmid    Legal Sex: Male  Gender ID: Male             [address] [facility] Name:    PCP:  Min Garibay Home: [phone]   Address:  [address] [place] [address] : 3/21/1950 (71 yrs) Mobile: [phone]         City/State/Zip: [facility] [address] Marital: Single Language: Alise park: [place] SSN4: [id number] Adventist:  [address]     Race: White Ethnicity: Non  Or [facility]   Name Relationship Legal Guardian? Home Phone Work Phone Mobile Phone   1. Angy Dominguez  2.  ParmjitjoeCruz Life Partner  Brother    [phone]     GUARANTOR  Guarantor: Aurelia Schmid : 3/21/1950 Home Phone: [phone]   Address: [address] [place] [address]  Sex: Male Work Phone:    City/State/Zip: [place]   Rel. to Patient: Self Guarantor ID: [id number]   GUARANTOR EMPLOYER   Employer:  Status: RETIRED     COVERAGE  PRIMARY INSURANCE   Payor: MEDICARE Plan: MEDICARE PART A&B   Group Number:  Insurance Type: INDEMNITY   Subscriber Name: Aurelia Schmid Subscriber : 1950   Subscriber ID: [id number] Pt Rel to Subscriber: Self   SECONDARY INSURANCE   Payor: MEDICAID Plan: MEDICAID   Group Number: [id number] Insurance Type: [id number] Name: Aurelia Schmid Subscriber : 3/21/1950   Subscriber ID: [id number] Pt Rel to Subscriber: SELF   TERTIARY INSURANCE   Payor:  Plan:    Group Number:  Insurance Type:    Subscriber Name:  Subscriber :    Subscriber ID:  Pt Rel to Subscriber:    Hospital Account Financial Class: Medicare    February 10, 2022

## (undated) NOTE — MR AVS SNAPSHOT
After Visit Summary   4/17/2017    Kya Andersontimganga ROSALVA    MRN: [id number]           Diagnoses this Visit     Acute saddle pulmonary embolism without acute cor pulmonale (HCC)    -  Primary       Allergies     Ambien [Zolpidem] Hallucinations    Bactr The following orders were created for panel order CBC WITH DIFFERENTIAL WITH PLATELET.   Procedure                               Abnormality         Status                     ---------                               -----------         ------ Visits < Visit Summaries. MyChart questions? Call [phone] for help. [employer] is NOT to be used for urgent needs. For medical emergencies, dial 911.

## (undated) NOTE — Clinical Note
ASTHMA ACTION PLAN for Karen Francisco III     : 3/21/1950     Date: 2017  Provider:  Analisa De Leon DO  Phone for doctor or clinic: [facility], [facility], [address] [address] [address] [phone]    [facility]

## (undated) NOTE — ED AVS SNAPSHOT
Mr. Mikey Sutton   MRN: [id number]    Department:  [facility] Emergency Department   Date of Visit:  2/21/2018           Disclosure     Insurance plans vary and the physician(s) referred by the ER may not be covered by your plan.  Please cont tell this physician (or your personal doctor if your instructions are to return to your personal doctor) about any new or lasting problems. The primary care or specialist physician will see patients referred from the [facility] Emergency Department.  Roscoe Palmer

## (undated) NOTE — ED AVS SNAPSHOT
Mr. Valente Christina   MRN: [id number]    Department:  [facility] Emergency Department   Date of Visit:  8/27/2019           Disclosure     Insurance plans vary and the physician(s) referred by the ER may not be covered by your plan.  Please c within the next three months to obtain basic health screening including reassessment of your blood pressure.     IF THERE IS ANY CHANGE OR WORSENING OF YOUR CONDITION, CALL YOUR PRIMARY CARE PHYSICIAN AT ONCE OR RETURN IMMEDIATELY TO THE EMERGENCY DEPARTMEN

## (undated) NOTE — LETTER
[facility] [address], [address]  Consent for Procedure/Sedation    Date: 8/18/20    Time: 2200      1. I authorize the performance upon Baldev Rodriguez III the following:  Transesophageal Echocardiogram      2.  I authorize Dr. Deven Zhu Printed: 2020   9:58 PM  Patient Name: Elke Morrow        : 3/21/1950       Medical Record #: [id number]

## (undated) NOTE — LETTER
[facility]  Kalyn [address] [facility], [address] [place] [address]    Consent for Operation    Date: __________________    Time: _______________    1.  I authorize the performance upon Jules Moe III the following operation:    Insertion of Biventricular Autom procedure has been videotaped, the surgeon will obtain the original videotape. The hospital will not be responsible for storage or maintenance of this tape.     6. For the purpose of advancing medical education, I consent to the admittance of observers to t STATEMENTS REQUIRING INSERTION OR COMPLETION WERE FILLED IN.     Signature of Patient:   ___________________________    When the patient is a minor or mentally incompetent to give consent:  Signature of person authorized to consent for patient: ____________ supplements, and pills I can buy without a prescription (including street drugs/illegal medications). Failure to inform my anesthesiologist about these medicines may increase my risk of anesthetic complications.   · If I am allergic to anything or have had Anesthesiologist Signature     Date   Time  I have discussed the procedure and information above with the patient (or patient’s representative) and answered their questions. The patient or their representative has agreed to have anesthesia services.     ___

## (undated) NOTE — LETTER
[facility] [address], [address]  Consent for Procedure/Sedation    Date:     Time:       1.  I authorize the performance upon Becca Gomez III the following:cardiac catheterization, left ventricular cineangiography, bilateral s period, the physician will determine when the applicable recovery period ends for purposes of reinstating the Do Not Resuscitate (DNR) order.     Signature of Patient: ____________________________________________________    Signature of person authorized

## (undated) NOTE — ED AVS SNAPSHOT
[facility] Emergency Department    [facility] [place] [address]    Phone:  [phone]    Fax:  [phone]           Mr. Nicholson Nu BLACKWELL   MRN: [id number]    Department:  [facility] Emergency Department   Date of Visit covered by your plan. Please contact your insurance company to determine coverage for follow-up care and referrals.     [address] [employer] [address] [phone]  Pediatric [phone] Emergency Department   [phone]       To by a radiologist.  If there is a significant change in your reading, you will be contacted. Please make sure we have your correct phone number before you leave. After you leave, you should follow the attached instructions.      I have read and understand th [address] 112. MyChart     Visit [employer]  You can access your MyChart to more actively manage your health care and view more details from this visit by going to [email or web address].   If you've recently had a stay at the [facility]

## (undated) NOTE — IP AVS SNAPSHOT
Patient Demographics     Address  [address] [place] [address]  [address] [address] Phone  [phone] [facility])  [phone] (Mobile) *Preferred* E-mail Address  [email or web address]      Emergency Contact(s)     Name Relation Home Work Mobile    [place] Evening As Needed   ACAI LEPE OR              acetaminophen-codeine 300-30 MG Tabs  Commonly known as: TYLENOL #3      Take 1 tablet by mouth every 4 (four) hours as needed for Pain.    Adrian Menchaca MD         albuterol 108 (71 Base) MCG/ACT Aers      Inhale due: As directed/taken at home              MALE SUPPORT OR  Next dose due: As directed/taken at home              furosemide 20 MG Tabs  Commonly known as: LASIX      Take 20 mg by mouth 2 (two) times daily.           Glucose Blood Strp  Commonly known as: FORMULARY  Next dose due: As directed/taken at home      [address]  Next dose due: As directed/taken at home      Plant Enzymes with [id number] - Enriching          NON FORMULARY  Next dose due: As directed/taken at home      Pure directed/taken at home      [employer] in [address]  Next dose due: As directed/taken at home      MegaHydrate          NON FORMULARY  Next dose due: As directed/taken at home      Organic Barley Grass Juice Powder          NON FORMUL Order ID Medication Name Action Time Action Reason Comments    [id number] amiodarone (PACERONE) tab 200 mg 12/10/21 1021 Given      [id number] cyclobenzaprine (Flexeril) tab 5 mg 12/10/21 0155 Given      [id number] docusate sodium (COLACE) cap 100 mg 12/09/21 Specimen: Other from Nares      Rapid SARS-CoV-2 by PCR Not Detected         H&P - H&P Note      H&P signed by Roberth Gamino MD at 12/3/2021  4:36 PM  Version 1 of 1    Author: Roberth Gamino MD Service: Hospitalist Author Type: Physician    Pamela Hernandez ask questions and note understanding and agreeing with therapeutic plan as outlined    Oscar Plascencia MD  [facility] Hospitalist  Answering Service number: [phone]    HPI       History of Present Illness:     Mr. Steff Villalpando is a 65 yo M with PMH of DM2, HTN, History:   Procedure Laterality Date   • CABG  2007   • COLONOSCOPY  01/03/2011   • OTHER SURGICAL HISTORY      aortic aneurysm repair   • OTHER SURGICAL HISTORY  2/2011    reimplantation of bypass grafts   • VALVE REPLACEMENT  2/2011    aortic- bioprosthe Data:    CBC/Chem    Recent Labs   Lab 12/03/21  1243   RBC 5.03   HGB 15.4   HCT 47.4   MCV 94.2   MCH 30.6   MCHC 32.5   RDW 13.0   NEPRELIM 7.00   WBC 8.2   .0*         Recent Labs   Lab 12/03/21  1243   *   BUN 23*   CREATSERUM 2.17*   GF organ systems and they are negative otherwise except noted in HPI    Current medications:    Current Facility-Administered Medications:   •  acetaminophen-codeine (TYLENOL #3) 300-30 MG tab 1 tablet, 1 tablet, Oral, Q4H PRN  •  amiodarone (PACERONE) tab 20 (chronic kidney disease) stage 4, GFR 15-29 ml/min ([facility])    • Diabetes ([facility]    • Essential hypertension    • Finger, superficial foreign body (splinter), without major open wound, infected    • Heart attack ([facility]    • Hyperlipidemia    • Hyperlipidemia LD toilet  Shower/Tub and Equipment: Tub-shower combo  Other Equipment: None  Hand Dominance: Right  Drives: No  Patient Regularly Uses: None     Stairs in Home: elevator bldg  Use of Assistive Device(s): RW     Prior Level of [place]: Pt reported that h awake,alert, oriented; normal mood and affect  MSK: PROM of BUE full; 5/5 b/l UE b/l LE 5/5 except 4/5 LLE DF   Neuro: CN 2-12 grossly intact, sensation intact to LT in BUE/BLE; negative hoffmans bilaterally; speech clear and fluent    Data Review:    Lab resulted this encounter. 2D Echocardiogram Results (HF patients only)    No exam resulted this encounter. Cath Angiogram Results (HF Patients only)    No exam resulted this encounter.           Physical Therapy Notes (last 72 hours)      Physical manage. Therapist reiterated recommendation for SALEEM to improve functional independence prior to returning home alone; patient is refusing. He reports he will sleep in a recliner at home, has pre made meals already.  Patient was left in bedside chair at end wheelchair)?: None   Help from Another: Need to walk in hospital room?: None   Help from Another: Climbing 3-5 steps with a railing?: A Little     AM-PAC Score:  Raw Score: 21   Approx Degree of Impairment: 28.97%   Standardized Score (AM-PAC Scale): 50.25 (last 72 hours)      Occupational Therapy Note signed by Kizzy Alvarenga OT at 12/9/2021  4:36 PM  Version 1 of 1    Author: Kizzy Alvarenga OT Service: — Author Type: Occupational Therapist    Filed: 12/9/2021  4:36 PM Date of Service: 12/9/2021  4:36 PM Stat

## (undated) NOTE — IP AVS SNAPSHOT
[facility] [place] [address] [place], [address] ~ [phone]                Discharge Summary   3/29/2017    Mr. Ashley García III           Admission Information        Provider Department    3/29/2017 Alexandria Hassan MD  8ne-A BD PEN NEEDLE JADEN U/F 32G X 4 MM Misc   Generic drug:  Insulin Pen Needle        USE ONE SYRINGE TWICE DAILY    Zoë Figueroa                        EPIPEN 2-BIENVENIDO 0.3 MG/0.3ML Caroline   Generic drug:  EPINEPHrine        Inject  as directed. HYPERGLYCEMIA (HIGH BLOOD SUGAR) (ENGLISH)    BLOOD SUGAR, HOW TO CHECK YOUR (ENGLISH)    DIABETES, TAKING MEDICINE FOR (ENGLISH)      Follow-up Information     Schedule an appointment as soon as possible for a visit with Stas Sharma MD.    Special 13.2 (04/01/17)  39.5 (04/01/17)  92.5    (04/01/17)  112.0 (L)     (03/31/17)  7.5 (03/31/17)  4.32 (03/31/17)  13.7 (03/31/17)  39.9 (03/31/17)  92.4    (03/31/17)  100.0 (L)       Recent Hematology Lab Results (cont.)  (Last 3 results in the past 90 day you to explore options for quitting.     - If you have concerns related to behavioral health issues or thoughts of harming yourself, contact [facility] at [phone].     - If you don’t have insurance, Alex Butt metoprolol Tartrate 25 MG Oral Tab    Irbesartan-Hydrochlorothiazide 150-12.5 MG Oral Tab    EPINEPHrine (EPIPEN 2-BIENVENIDO) 0.3 MG/0.3ML Injection Device         Use: Treat abnormal blood pressure (high or low), cardiac conditions; and/or abnormal heart rates

## (undated) NOTE — LETTER
ASTHMA ACTION PLAN for Marisol Setting III     : 3/21/1950     Date: 2018  Provider:  Lorin Davila DO  Phone for doctor or clinic: [facility], [facility], [address] [phone]

## (undated) NOTE — LETTER
[address] [address], [place]      Authorization for Surgical Operation and Procedure     Date:___________                                                                                                         Time:__________    1. I hereby Sedalia Oppenheim, MD, my physician and his/her assistants (if applicable), which may include medical students, residents, and/or fellows, to perform the following surgical operation/ procedure and administer such anesthesia as may be determined necessary by my physician:  Operation/Procedure name (s) L4-S1 transforaminal lumbar interbody fusion with use of allograft bone  on Ureil Intini III   2. I recognize that during the surgical operation/procedure, unforeseen conditions may necessitate additional or different procedures than those listed above. I, therefore, further authorize and request that the above-named surgeon, assistants, or designees perform such procedures as are, in their judgment, necessary and desirable. 3.   My surgeon/physician has discussed prior to my surgery the potential benefits, risks and side effects of this procedure; the likelihood of achieving goals; and potential problems that might occur during recuperation. They also discussed reasonable alternatives to the procedure, including risks, benefits, and side effects related to the alternatives and risks related to not receiving this procedure. I have had all my questions answered and I acknowledge that no guarantee has been made as to the result that may be obtained. 4.   Should the need arise during my operation or immediate post-operative period, I also consent to the administration of blood and/or blood products. Further, I understand that despite careful testing and screening of blood or blood products by collecting agencies, I may still be subject to ill effects as a result of receiving a blood transfusion and/or blood products.   The following are some, but not all, of the potential risks that can occur: fever and allergic reactions, hemolytic reactions, transmission of diseases such as Hepatitis, AIDS and Cytomegalovirus (CMV) and fluid overload. In the event that I wish to have an autologous transfusion of my own blood, or a directed donor transfusion. I will discuss this with my physician. 5.   I authorize the use of any specimen, organs, tissues, body parts or foreign objects that may be removed from my body during the operation/procedure for diagnosis, research or teaching purposes and their subsequent disposal by hospital authorities. I also authorize the release of specimen test results and/or written reports to my treating physician on the hospital medical staff or other referring or consulting physicians involved in my care, at the discretion of the Pathologist or my treating physician. 6.   I consent to the photographing or videotaping of the operations or procedures to be performed, including appropriate portions of my body for medical, scientific, or educational purposes, provided my identity is not revealed by the pictures or by descriptive texts accompanying them. If the procedure has been photographed/videotaped, the surgeon will obtain the original picture, image, videotape or CD. The hospital will not be responsible for storage, release or maintenance of the picture, image, tape or CD.    7.   I consent to the presence of a  or observers in the operating room as deemed necessary by my physician or their designees. 8.   I recognize that in the event my procedure results in extended X-Ray/fluoroscopy time, I may develop a skin reaction. 9. If I have a Do Not Attempt Resuscitation (DNAR) order in place, that status will be suspended while in the operating room, procedural suite, and during the recovery period unless otherwise explicitly stated by me (or a person authorized to consent on my behalf).  The surgeon or my attending physician will determine when the applicable recovery period ends for purposes of reinstating the DNAR order. 10. Patients having a sterilization procedure: I understand that if the procedure is successful the results will be permanent and it will therefore be impossible for me to inseminate, conceive, or bear children. I also understand that the procedure is intended to result in sterility, although the result has not been guaranteed. 11. I acknowledge that my physician has explained sedation/analgesia administration to me including the risk and benefits I consent to the administration of sedation/analgesia as may be necessary or desirable in the judgment of my physician. I CERTIFY THAT I HAVE READ AND FULLY UNDERSTAND THE ABOVE CONSENT TO OPERATION and/or OTHER PROCEDURE.    _________________________________________  __________________________________  Signature of Patient     Signature of Responsible Person         ___________________________________         Printed Name of Responsible Person           _________________________________                  Relationship to Patient  _________________________________________  ______________________________  Signature of Witness          Date  Time    STATEMENT OF PHYSICIAN My signature below affirms that prior to the time of the procedure; I have explained to the patient and/or his/her legal representative, the risks and benefits involved in the proposed treatment and any reasonable alternative to the proposed treatment. I have also explained the risks and benefits involved in refusal of the proposed treatment and alternatives to the proposed treatment and have answered the patient's questions.  If I have a significant financial interest in a co-management agreement or a significant financial interest in any product or implant, or other significant relationship used in this procedure/surgery, I have disclosed this and had a discussion with my patient.     _______________________________________________________________ _____________________________  Tuan Roof of Physician)                                                                                         (Date)                                   (Time)        Patient Name: Ladarius Covarrubias    : 3/21/1950   Printed: 2/15/2022      Medical Record #: [id number]                                              Page 1 of 1

## (undated) NOTE — LETTER
Martín Alcocer [address], [place], [place]  [address]  INFORMED CONSENT FOR TRANSFUSION OF BLOOD OR BLOOD PRODUCTS  My physician has informed me of the nature, purpose, benefits and risks of transfusion for blood and blood components that he/she may deem necessary during my treatment or hospitalization. He/she has also discussed alternatives to receiving blood from the voluntary blood supply with me, such as self-donation (autologous) and directed donation (blood donated by family or friends to be used specifically for me). I further understand that while the [address] [place] [address] will attempt to supply any autologous or directed donor blood prior to transfusion of blood from the routine blood supply, medical circumstances may require that other or additional blood components may be required for my care. In giving consent, I acknowledge that my physician has also informed me that despite careful screening and testing in accordance with national and regional regulations, there is still a small risk of transmission of infectious agents including hepatitis, HIV-1/2, cytomegalovirus and other viruses or diseases as yet unknown for which licensed definitive screening tests do not currently exist. Additionally, my physician has informed me of the potential for transfusion reactions not related to an infectious agent. [  ]  Check here for Recurring Outpatient Transfusion Therapy (valid for 1 year) In addition to the above, my physician has informed me that I shall receive numerous transfusions over a period of time and that these can lead to other increased risks. I hereby authorize the transfusion of blood and/or blood products to me as deemed necessary and ordered by physicians participating in my care.  My physician has given me the opportunity to ask questions and any questions asked have been answered to my satisfaction  __________________________________________ ______________________________________________  (Signature of Patient)                                                            (Responsible party in case of Minor,                                                                                                 Incompetent, or unconscious Patient)  ___________________________________________       ________ ______________________________________  (Relationship to Patient)                                                       (Signature of Witness)  ______________________________________________________________________________________________   (Date)                                                                           (Time)  REFUSAL OF CONSENT FOR BLOOD TRANSFUSIONS   Sign only if Refusing   [  ] I understand refusal of blood or blood products as deemed necessary by my physician may have serious consequences to my condition to include possible death.  I hereby assume responsibility for my refusal and release the hospital, its personnel, and my physicians from any responsibility for the consequences of my refusal.    ________________________________________________________________________________  (Signature of Patient)                                                         (Responsible Party/Relationship to Patient)    ________________________________________________________________________________  (Signature of Witness)                                                       (Date/Time)     Patient Name: Priyanka Clark     : 3/21/1950                 Printed: 2/15/2022      Medical Record #: [id number]                                 Page 1 of 1

## (undated) NOTE — Clinical Note
Pt does not have HFU appt at this time- unable to schedule appt given scheduling limitations. TE sent to triage to f/u with pt regarding HFU appt and medications.

## (undated) NOTE — MR AVS SNAPSHOT
[facility], [address] [phone]               Thank you for choosing us for your health care visit with [address] [place] [address], .   We are glad to serve you and happy to provide you with this s be advised that they will not be able to accompany the child in the testing room. Parents will remain in the MRI waiting area and be reunited with the child upon completion of the MRI.             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 If tests were done, you will be told if your treatment needs to be changed. You can call as directed for the results. (Note: If an X-ray was taken, a specialist will review it.  You w This list is accurate as of: 3/29/17 10:29 AM.  Always use your most recent med list.                Albuterol Sulfate  (90 Base) MCG/ACT Aers   Inhale 2 puffs into the lungs every 6 (six) hours as needed for Wheezing or Shortness of Breath.    Commo Results of Recent Testing     ELECTROCARDIOGRAM, COMPLETE             MyChart     Visit MyChart  You can access your MyChart to more actively manage your health care and view more details from this visit by going to [email or web address].   If you've

## (undated) NOTE — Clinical Note
Pt rescheduled HFU appt to 11/6/17 at 12:30pm.  He is refusing to come in any sooner at this time. Thank you!

## (undated) NOTE — LETTER
[address] [place] [address]      Authorization for Surgical Operation and Procedure     Date:___________                                                                                                         Time:__________  1. I hereby Luca Stanford MD, my physician and his/her assistants (if applicable), which may include medical students, residents, and/or fellows, to perform the following surgical operation/ procedure and administer such anesthesia as may be determined necessary by my physician:  Operation/Procedure name (s) L4-5 and L5-S1 Transforaminal Lumbar Interbody Fusion with Interbody Cages, Cadaver Bone Graft and Metallic Rods and Screws on Angelita Carls III   2. I recognize that during the surgical operation/procedure, unforeseen conditions may necessitate additional or different procedures than those listed above. I, therefore, further authorize and request that the above-named surgeon, assistants, or designees perform such procedures as are, in their judgment, necessary and desirable. 3.   My surgeon/physician has discussed prior to my surgery the potential benefits, risks and side effects of this procedure; the likelihood of achieving goals; and potential problems that might occur during recuperation. They also discussed reasonable alternatives to the procedure, including risks, benefits, and side effects related to the alternatives and risks related to not receiving this procedure. I have had all my questions answered and I acknowledge that no guarantee has been made as to the result that may be obtained. 4.   Should the need arise during my operation or immediate post-operative period, I also consent to the administration of blood and/or blood products.   Further, I understand that despite careful testing and screening of blood or blood products by collecting agencies, I may still be subject to ill effects as a result of receiving a blood transfusion and/or blood products. The following are some, but not all, of the potential risks that can occur: fever and allergic reactions, hemolytic reactions, transmission of diseases such as Hepatitis, AIDS and Cytomegalovirus (CMV) and fluid overload. In the event that I wish to have an autologous transfusion of my own blood, or a directed donor transfusion. I will discuss this with my physician. 5.   I authorize the use of any specimen, organs, tissues, body parts or foreign objects that may be removed from my body during the operation/procedure for diagnosis, research or teaching purposes and their subsequent disposal by hospital authorities. I also authorize the release of specimen test results and/or written reports to my treating physician on the hospital medical staff or other referring or consulting physicians involved in my care, at the discretion of the Pathologist or my treating physician. 6.   I consent to the photographing or videotaping of the operations or procedures to be performed, including appropriate portions of my body for medical, scientific, or educational purposes, provided my identity is not revealed by the pictures or by descriptive texts accompanying them. If the procedure has been photographed/videotaped, the surgeon will obtain the original picture, image, videotape or CD. The hospital will not be responsible for storage, release or maintenance of the picture, image, tape or CD.    7.   I consent to the presence of a  or observers in the operating room as deemed necessary by my physician or their designees. 8.   I recognize that in the event my procedure results in extended X-Ray/fluoroscopy time, I may develop a skin reaction. 9.  If I have a Do Not Attempt Resuscitation (DNAR) order in place, that status will be suspended while in the operating room, procedural suite, and during the recovery period unless otherwise explicitly stated by me (or a person authorized to consent on my behalf). The surgeon or my attending physician will determine when the applicable recovery period ends for purposes of reinstating the DNAR order. 10. Patients having a sterilization procedure: I understand that if the procedure is successful the results will be permanent and it will therefore be impossible for me to inseminate, conceive, or bear children. I also understand that the procedure is intended to result in sterility, although the result has not been guaranteed. 11. I acknowledge that my physician has explained sedation/analgesia administration to me including the risk and benefits I consent to the administration of sedation/analgesia as may be necessary or desirable in the judgment of my physician. I CERTIFY THAT I HAVE READ AND FULLY UNDERSTAND THE ABOVE CONSENT TO OPERATION and/or OTHER PROCEDURE.    _________________________________________  __________________________________  Signature of Patient     Signature of Responsible Person         ___________________________________         Printed Name of Responsible Person           _________________________________                  Relationship to Patient  _________________________________________  ______________________________  Signature of Witness          Date  Time    STATEMENT OF PHYSICIAN My signature below affirms that prior to the time of the procedure; I have explained to the patient and/or his/her legal representative, the risks and benefits involved in the proposed treatment and any reasonable alternative to the proposed treatment. I have also explained the risks and benefits involved in refusal of the proposed treatment and alternatives to the proposed treatment and have answered the patient's questions.  If I have a significant financial interest in a co-management agreement or a significant financial interest in any product or implant, or other significant relationship used in this procedure/surgery, I have disclosed this and had a discussion with my patient.     _______________________________________________________________ _____________________________  Oly Brennerl of Physician)                                                                                         (Date)                                   (Time)        Patient Name: Russ Bach    : 3/21/1950   Printed: 2/15/2022      Medical Record #: [id number]                                              Page 1 of 1

## (undated) NOTE — IP AVS SNAPSHOT
[facility]            (For Outpatient Use Only) Initial Admit Date: 12/3/2021   Inpt/Obs Admit Date: Inpt: 12/3/21 / Obs: N/A   Discharge Date:    Lele Binet:  [de-identified]   MRN: [de-identified]   CSN: [id number]   CEID: [id number]        BRIANA Payor:  Plan:    Group Number:  Insurance Type:    Subscriber Name:  Subscriber :    Subscriber ID:  Pt Rel to Subscriber:    Hospital Account Financial Class: Medicare    December 10, 2021

## (undated) NOTE — ED AVS SNAPSHOT
[facility] Emergency Department    [facility] [address] [address]    Phone:  [phone]    Fax:  [phone]           Mr. Mathew Schultz III   MRN: [id number]    Department:  [facility] Emergency Department   Date of Visit IF THERE IS ANY CHANGE OR WORSENING OF YOUR CONDITION, CALL YOUR PRIMARY CARE PHYSICIAN AT ONCE OR RETURN IMMEDIATELY TO THE EMERGENCY DEPARTMENT.     If you have been prescribed any medication(s), please fill your prescription right away and begin taking t

## (undated) NOTE — LETTER
[address] [place] [address]      Authorization for Surgical Operation and Procedure     Date:___________                                                                                                         Time:__________  1. I hereby Rosalio Lundberg MD, my physician and his/her assistants (if applicable), which may include medical students, residents, and/or fellows, to perform the following surgical operation/ procedure and administer such anesthesia as may be determined necessary by my physician:  Operation/Procedure name (s) L4-S1 transforaminal lumbar interbody fusion with use of allograft bone  on Uriel Intini III   2. I recognize that during the surgical operation/procedure, unforeseen conditions may necessitate additional or different procedures than those listed above. I, therefore, further authorize and request that the above-named surgeon, assistants, or designees perform such procedures as are, in their judgment, necessary and desirable. 3.   My surgeon/physician has discussed prior to my surgery the potential benefits, risks and side effects of this procedure; the likelihood of achieving goals; and potential problems that might occur during recuperation. They also discussed reasonable alternatives to the procedure, including risks, benefits, and side effects related to the alternatives and risks related to not receiving this procedure. I have had all my questions answered and I acknowledge that no guarantee has been made as to the result that may be obtained. 4.   Should the need arise during my operation or immediate post-operative period, I also consent to the administration of blood and/or blood products. Further, I understand that despite careful testing and screening of blood or blood products by collecting agencies, I may still be subject to ill effects as a result of receiving a blood transfusion and/or blood products.   The following are some, but not all, of the potential risks that can occur: fever and allergic reactions, hemolytic reactions, transmission of diseases such as Hepatitis, AIDS and Cytomegalovirus (CMV) and fluid overload. In the event that I wish to have an autologous transfusion of my own blood, or a directed donor transfusion. I will discuss this with my physician. 5.   I authorize the use of any specimen, organs, tissues, body parts or foreign objects that may be removed from my body during the operation/procedure for diagnosis, research or teaching purposes and their subsequent disposal by hospital authorities. I also authorize the release of specimen test results and/or written reports to my treating physician on the hospital medical staff or other referring or consulting physicians involved in my care, at the discretion of the Pathologist or my treating physician. 6.   I consent to the photographing or videotaping of the operations or procedures to be performed, including appropriate portions of my body for medical, scientific, or educational purposes, provided my identity is not revealed by the pictures or by descriptive texts accompanying them. If the procedure has been photographed/videotaped, the surgeon will obtain the original picture, image, videotape or CD. The hospital will not be responsible for storage, release or maintenance of the picture, image, tape or CD.    7.   I consent to the presence of a  or observers in the operating room as deemed necessary by my physician or their designees. 8.   I recognize that in the event my procedure results in extended X-Ray/fluoroscopy time, I may develop a skin reaction. 9. If I have a Do Not Attempt Resuscitation (DNAR) order in place, that status will be suspended while in the operating room, procedural suite, and during the recovery period unless otherwise explicitly stated by me (or a person authorized to consent on my behalf).  The surgeon or my attending physician will determine when the applicable recovery period ends for purposes of reinstating the DNAR order. 10. Patients having a sterilization procedure: I understand that if the procedure is successful the results will be permanent and it will therefore be impossible for me to inseminate, conceive, or bear children. I also understand that the procedure is intended to result in sterility, although the result has not been guaranteed. 11. I acknowledge that my physician has explained sedation/analgesia administration to me including the risk and benefits I consent to the administration of sedation/analgesia as may be necessary or desirable in the judgment of my physician. I CERTIFY THAT I HAVE READ AND FULLY UNDERSTAND THE ABOVE CONSENT TO OPERATION and/or OTHER PROCEDURE.    _________________________________________  __________________________________  Signature of Patient     Signature of Responsible Person         ___________________________________         Printed Name of Responsible Person           _________________________________                  Relationship to Patient  _________________________________________  ______________________________  Signature of Witness          Date  Time    STATEMENT OF PHYSICIAN My signature below affirms that prior to the time of the procedure; I have explained to the patient and/or his/her legal representative, the risks and benefits involved in the proposed treatment and any reasonable alternative to the proposed treatment. I have also explained the risks and benefits involved in refusal of the proposed treatment and alternatives to the proposed treatment and have answered the patient's questions.  If I have a significant financial interest in a co-management agreement or a significant financial interest in any product or implant, or other significant relationship used in this procedure/surgery, I have disclosed this and had a discussion with my patient.     _______________________________________________________________ _____________________________  Sam Fat of Physician)                                                                                         (Date)                                   (Time)        Patient Name: Tsohia Fuentes    : 3/21/1950   Printed: 2/15/2022      Medical Record #: [id number]                                              Page 1 of 1

## (undated) NOTE — IP AVS SNAPSHOT
[facility]            (For Outpatient Use Only) Initial Admit Date: 2022   Inpt/Obs Admit Date: Inpt: 22 / Obs: N/A   Discharge Date:    Lanre Diop:  [de-identified]   MRN: [de-identified]   CSN: [id number]   CEID: [id number]        ENCOUNTER  Patient Class: Inpatient Admitting Provider: Umair Dash MD Unit: [address]//SE   Hospital Service: Ortho/Spine Attending Provider: Carlos Toussaint. Lan Corado MD   Bed: 415-A   Visit Type:   Referring Physician: No ref. provider found Billing Flag:    Admit Diagnosis: Lumbar radiculopathy [M54.16]      PATIENT  Legal Name:   Aurelia Schmid    Legal Sex: Male  Gender ID: Male             [address] [facility] Name:    PCP:  Min Garibay Home: [phone]   Address:  [address] [place] [address] : 3/21/1950 (71 yrs) Mobile: [phone]         City/State/Zip: [place] [address] Marital: Single Language: Alise park: [place] SSN4: [id number] Rastafarian: [place] Denominational     Race: White Ethnicity: Non  Or [address] CONTACT   Name Relationship Legal Guardian? Home Phone Work Phone Mobile Phone   1. Angy Dominguez  2.  Cruz Crowe Life Partner  Brother    [phone]     GUARANTOR  Guarantor: Aurelia Schmid : 3/21/1950 Home Phone: [phone]   Address: [address] [place] [address]  Sex: Male Work Phone:    City/State/Zip: [address]   Rel. to Patient: Self Guarantor ID: [id number]   GUARANTOR EMPLOYER   Employer:  Status: RETIRED     COVERAGE  PRIMARY INSURANCE   Payor: MEDICARE Plan: MEDICARE PART A&B   Group Number:  Insurance Type: INDEMNITY   Subscriber Name: Aurelia Schmid Subscriber : 1950   Subscriber ID: [id number] Pt Rel to Subscriber: Self   SECONDARY INSURANCE   Payor: MEDICAID Plan: MEDICAID   Group Number: [id number] Insurance Type: [id number] Name: Aurelia Schmid Subscriber : 3/21/1950   Subscriber ID: [id number] Pt Rel to Subscriber: SELF   TERTIARY INSURANCE   Payor:  Plan:    Group Number:  Insurance Type:    Subscriber Name:  Subscriber :    Subscriber ID:  Pt Rel to Subscriber:    Hospital Account Financial Class: Medicare    2022

## (undated) NOTE — LETTER
[facility]  Administration of Anesthesia  1. I, Rebeca Phillips, or _________________________________ acting on his behalf, (Patient) (Dependent/Representative) request to receive anesthesia for my pending procedure/operation/treatment. A physician (anesthesiologist) alone or an anesthesiologist working with a nurse anesthetist may administer my anesthesia. 2. I understand that my anesthesiologist is not an employee or agent of the hospital, but is an independent medical practitioner who has been permitted to use its facilities for the care and treatment of his/her patients. 3. I acknowledge that a physician from [facility] or their designate(s), recommended anesthesia for me using her/his medical judgment. The type(s) of anesthesia I may receive include:                a) General Anesthesia, b) Spinal/Epidural Anesthesia, c) Regional Anesthesia or d) Monitored Anesthesia Care. 4. If my spinal, regional or monitored anesthesia care (local) is not satisfactory for my comfort, or if my medical condition requires, I consent to the administration of general anesthesia. 5. I am aware that the practice of anesthesiology is not an exact science and that some foreseeable risks or consequences may occur. Some common risks/consequences include sore throat and hoarseness, nausea and vomiting, muscle soreness, backache, damage to the mouth/teeth/vocal cords and eye injury. I understand that more rare but serious potential risks of anesthesia include blood pressure changes, drug reactions, cardiac arrest, brain damage, paralysis or death. These risks apply to whether I have general, spinal/epidural, regional or monitored anesthesia care. 6. OBSTETRIC PATIENTS: Specific risks/consequences of spinal/epidural anesthesia may include itching, low blood pressure, difficulty urinating, slowing of the baby's heart rate and headache.  Rare risks include infections, high spinal block, spinal bleeding, seizure, cardiac arrest and death. 7. AWARENESS: I understand that it is possible (but unlikely) to have explicit memory of events from the operating room while under general anesthesia. 8. ELECTROCONVULSIVE THERAPY PATIENTS: This consent serves for all treatments in a single course of therapy. 9. I understand that I must inform my anesthesiologist when I last ate and/or drank to minimize the risk of anesthesia. 10. If I am pregnant, or may pregnant, I understand that elective surgery should be postponed until after the baby is born. Anesthetics cross the placenta and may temporarily anesthetize the baby. Although fetal complications of anesthesia during pregnancy are rare, they may include birth defects, premature labor, brain damage and death. 11. I certify that I informed the anesthesiologist, to the best of my ability, about medication I take including blood thinners, anticoagulants, herbal remedies, narcotics and recreational drugs (e.g. cocaine, marijuana, PCP). Failure to inform my anesthesiologist about these medicines may increase my risk of anesthetic complications. The nature and purpose of my anesthetic management was explained to me. I had the opportunity to ask questions and the answers and information provided meet my satisfaction.   I retain the right to withdraw this consent at any time prior to the administration of said anesthetic.    ___________________________________________________           _____________________________________________________  Patient Signature                                                                                      Witness Signature                ___________________________________________________           _____________________________________________________  Date/Time                                                                                               Responsible person in case of minor/ unconscious pt /Relationship    My signature below affirms that prior to the time of the procedure, I have explained to the patient and/or his/her guardian, the risks and benefits of undergoing anesthesia, as well as any reasonable alternatives.     ___________________________________________________            _____________________________________________________  Physician Signature                            Date/Time  Patient Name: Coco Maher     : 3/21/1950     Printed: 2/15/2022      Medical Record #: [id number]                              Page 1 of 1    ----------ANESTHESIA CONSENT----------

## (undated) NOTE — Clinical Note
TCM call completed. The patient is scheduled for TCM and to establish care on 2/14/2022. The patient insisted on continuing irbesartan at discharge and declined reviewing supplements with the [facility]. Thank you.

## (undated) NOTE — MR AVS SNAPSHOT
[facility], [address] [address] [phone]               Thank you for choosing us for your health care visit with Godfrey Tomlin DO.   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.   If you've recently had a stay at the Hospital you can access your discharge instructions in [address] by [employer]ar[employer] You don’t need to join a gym. Home exercises work great.  Put more priority on exercise in your life                    Visit [facility] online at  [email or web address]